# Patient Record
Sex: MALE | Race: WHITE | NOT HISPANIC OR LATINO | Employment: FULL TIME | URBAN - METROPOLITAN AREA
[De-identification: names, ages, dates, MRNs, and addresses within clinical notes are randomized per-mention and may not be internally consistent; named-entity substitution may affect disease eponyms.]

---

## 2017-03-31 ENCOUNTER — RADIANT APPOINTMENT (OUTPATIENT)
Dept: GENERAL RADIOLOGY | Facility: CLINIC | Age: 52
End: 2017-03-31
Attending: FAMILY MEDICINE
Payer: COMMERCIAL

## 2017-03-31 ENCOUNTER — OFFICE VISIT (OUTPATIENT)
Dept: FAMILY MEDICINE | Facility: CLINIC | Age: 52
End: 2017-03-31
Payer: COMMERCIAL

## 2017-03-31 VITALS
DIASTOLIC BLOOD PRESSURE: 84 MMHG | HEART RATE: 90 BPM | BODY MASS INDEX: 29.03 KG/M2 | HEIGHT: 73 IN | WEIGHT: 219 LBS | SYSTOLIC BLOOD PRESSURE: 126 MMHG

## 2017-03-31 DIAGNOSIS — M25.512 CHRONIC LEFT SHOULDER PAIN: Primary | ICD-10-CM

## 2017-03-31 DIAGNOSIS — G89.29 CHRONIC LEFT SHOULDER PAIN: Primary | ICD-10-CM

## 2017-03-31 DIAGNOSIS — Z12.11 SPECIAL SCREENING FOR MALIGNANT NEOPLASMS, COLON: ICD-10-CM

## 2017-03-31 DIAGNOSIS — M25.512 CHRONIC LEFT SHOULDER PAIN: ICD-10-CM

## 2017-03-31 DIAGNOSIS — G89.29 CHRONIC LEFT SHOULDER PAIN: ICD-10-CM

## 2017-03-31 PROCEDURE — 20610 DRAIN/INJ JOINT/BURSA W/O US: CPT | Mod: LT | Performed by: FAMILY MEDICINE

## 2017-03-31 PROCEDURE — 99213 OFFICE O/P EST LOW 20 MIN: CPT | Mod: 25 | Performed by: FAMILY MEDICINE

## 2017-03-31 PROCEDURE — 73030 X-RAY EXAM OF SHOULDER: CPT | Mod: LT

## 2017-03-31 RX ORDER — TRIAMCINOLONE ACETONIDE 40 MG/ML
40 INJECTION, SUSPENSION INTRA-ARTICULAR; INTRAMUSCULAR ONCE
Qty: 1 ML | Refills: 0 | COMMUNITY
Start: 2017-03-31 | End: 2023-06-12

## 2017-03-31 ASSESSMENT — ANXIETY QUESTIONNAIRES
IF YOU CHECKED OFF ANY PROBLEMS ON THIS QUESTIONNAIRE, HOW DIFFICULT HAVE THESE PROBLEMS MADE IT FOR YOU TO DO YOUR WORK, TAKE CARE OF THINGS AT HOME, OR GET ALONG WITH OTHER PEOPLE: VERY DIFFICULT
3. WORRYING TOO MUCH ABOUT DIFFERENT THINGS: MORE THAN HALF THE DAYS
5. BEING SO RESTLESS THAT IT IS HARD TO SIT STILL: SEVERAL DAYS
7. FEELING AFRAID AS IF SOMETHING AWFUL MIGHT HAPPEN: MORE THAN HALF THE DAYS
2. NOT BEING ABLE TO STOP OR CONTROL WORRYING: SEVERAL DAYS
1. FEELING NERVOUS, ANXIOUS, OR ON EDGE: SEVERAL DAYS
6. BECOMING EASILY ANNOYED OR IRRITABLE: NEARLY EVERY DAY
GAD7 TOTAL SCORE: 12

## 2017-03-31 ASSESSMENT — PATIENT HEALTH QUESTIONNAIRE - PHQ9: 5. POOR APPETITE OR OVEREATING: MORE THAN HALF THE DAYS

## 2017-03-31 NOTE — NURSING NOTE
"Initial /84  Pulse 90  Ht 6' 1\" (1.854 m)  Wt 219 lb (99.3 kg)  BMI 28.89 kg/m2 Estimated body mass index is 28.89 kg/(m^2) as calculated from the following:    Height as of this encounter: 6' 1\" (1.854 m).    Weight as of this encounter: 219 lb (99.3 kg). .    Tamra Crews    "

## 2017-03-31 NOTE — MR AVS SNAPSHOT
After Visit Summary   3/31/2017    Demetrius Burton    MRN: [id number]           Patient Information     Date Of Birth          1965        Visit Information        Provider Department      3/31/2017 1:20 PM Raj Almazan MD [facility]        Today's Diagnoses     Special screening for malignant neoplasms, colon    -  1    Chronic left shoulder pain          Care Instructions      Shoulder and Upper Back Stretch  To start, stand tall with your ears, shoulders, and hips in line. Your feet should be slightly apart, positioned just under your hips. Focus your eyes directly in front of you.  this position for a few seconds before starting your exercise. This helps increase your awareness of proper posture.  Reach overhead and slightly back with both arms. Keep your shoulders and neck aligned and your elbows behind your shoulders:    With your palms facing the ceiling, turn your fingers inward.    Take a deep breath. Breathe out, and lower your elbows toward your buttocks. Hold for 5 seconds, then return to starting position.    Repeat 3 times.         6804-1803 [employer]. [address] [place] [address]. All rights reserved. This information is not intended as a substitute for professional medical care. Always follow your healthcare professional's instructions.        Shoulder Girdle Stretch      To start, sit in a chair with your feet flat on the floor. Your weight should be slightly forward so that you re balanced evenly on your buttocks. Relax your shoulders and keep your head level. Using a chair with arms may help you keep your balance:    Place 1 hand on the outside elbow of the other arm.    Pull the arm across your body. Hold for 30 to 60 seconds. Repeat once.    Switch sides.  For your safety, check with your healthcare provider before starting an exercise program.     9659-3185 [employer]. [address] [place] [address],  [place] [address]. All rights reserved. This information is not intended as a substitute for professional medical care. Always follow your healthcare professional's instructions.        Shoulder Exercises      To start, sit in a chair with your feet flat on the floor. Your weight should be slightly forward so that you re balanced evenly on your buttocks. Relax your shoulders and keep your head level. Avoid arching your back or rounding your shoulders. Using a chair with arms may help you keep your balance.    Raise your arms, elbows bent, to shoulder height.    Slowly move your forearms together. Hold for 5 seconds.    Return to starting position. Repeat 5 times.    0395-1861 [employer]. [address] [place] [address]. All rights reserved. This information is not intended as a substitute for professional medical care. Always follow your healthcare professional's instructions.        Shoulder Squeeze Exercise      To start, sit in a chair with your feet flat on the floor. Shift your weight slightly forward to avoid rounding your back. Relax. Keep your ears, shoulders, and hips aligned:    Raise your arms to shoulder height, elbows bent and palms forward.    Move your arms back, squeezing your shoulder blades together.    Hold for 10 seconds. Return to starting position.     Repeat 5 times.   For your safety, check with your healthcare provider before starting an exercise program.     8430-9163 [employer]. [address] [place] [address]. All rights reserved. This information is not intended as a substitute for professional medical care. Always follow your healthcare professional's instructions.              Follow-ups after your visit        Future tests that were ordered for you today     Open Future Orders        Priority Expected Expires Ordered    Fecal colorectal cancer screen (FIT) Routine 4/21/2017 6/23/2017 3/31/2017            Who to contact     If you have  "questions or need follow up information about today's clinic visit or your schedule please contact [facility] directly at [phone].  Normal or non-critical lab and imaging results will be communicated to you by MyChart, letter or phone within 4 business days after the clinic has received the results. If you do not hear from us within 7 days, please contact the clinic through MyChart or phone. If you have a critical or abnormal lab result, we will notify you by phone as soon as possible.  Submit refill requests through [employer] or call your pharmacy and they will forward the refill request to us. Please allow 3 business days for your refill to be completed.          Additional Information About Your Visit        [email or web address] Information     [employer] gives you secure access to your electronic health record. If you see a primary care provider, you can also send messages to your care team and make appointments. If you have questions, please call your primary care clinic.  If you do not have a primary care provider, please call [phone] and they will assist you.        Care EveryWhere ID     This is your Care EveryWhere ID. This could be used by other organizations to access your [facility] medical records  [id number]        Your Vitals Were     Pulse Height BMI (Body Mass Index)             90 6' 1\" (1.854 m) 28.89 kg/m2          Blood Pressure from Last 3 Encounters:   03/31/17 126/84   11/04/16 129/85   02/09/16 136/87    Weight from Last 3 Encounters:   03/31/17 219 lb (99.3 kg)   11/04/16 215 lb (97.5 kg)   02/09/16 209 lb 9.6 oz (95.1 kg)               Primary Care Provider Office Phone # Fax #    Raj Almazan [phone]       [facility] [address] [facility] [address]        Thank you!     Thank you for choosing [facility]  for your care. Our goal is always to provide you with excellent care. Hearing back from our patients is one way we " can continue to improve our services. Please take a few minutes to complete the written survey that you may receive in the mail after your visit with us. Thank you!             Your Updated Medication List - Protect others around you: Learn how to safely use, store and throw away your medicines at www.disposemymeds.org.      Notice  As of 3/31/2017  2:15 PM    You have not been prescribed any medications.

## 2017-03-31 NOTE — PROGRESS NOTES
SUBJECTIVE:                                                    Demetrius Burton is a 52 year old male who presents to clinic today for the following health issues:      Joint Pain     Onset: months on the left, 2-3 mos for the left shoulder    Description:   Location: left shoulder and right shoulder  Character: Sharp and Gnawing, burning at night    Intensity: mild, moderate, severe    Progression of Symptoms: intermittent    Accompanying Signs & Symptoms:  Other symptoms: none   History:   Previous similar pain: YES      Precipitating factors:   Trauma or overuse: no     Alleviating factors:  Improved by: nothing       Therapies Tried and outcome: none      History as above. Patient is a 52 yr old male here for bilateral  shoulder pain L >R ongoing for about two months.He reports that the pain is a dull pain , it is more prominent at night and he has some reduced range of motion.He has not observed any swelling or wasting of muscles. No tingling or numbness in his fingers. He reports no injury or heavy lifting.He has not tired any medication for the shoulder pain.    Problem list and histories reviewed & adjusted, as indicated.  Additional history: as documented    Patient Active Problem List   Diagnosis     Other specified forms of hearing loss     HYPERLIPIDEMIA LDL GOAL <160     Prediabetes     No past surgical history on file.    Social History   Substance Use Topics     Smoking status: Never Smoker     Smokeless tobacco: Never Used     Alcohol use Yes      Comment: rare     Family History   Problem Relation Age of Onset     DIABETES Mother      HEART DISEASE Mother      stent placement.         Current Outpatient Prescriptions   Medication Sig Dispense Refill     triamcinolone acetonide (KENALOG-40) 40 MG/ML injection 1 mL (40 mg) by INTRA-ARTICULAR route once for 1 dose 1 mL 0     No Known Allergies  BP Readings from Last 3 Encounters:   03/31/17 126/84   11/04/16 129/85   02/09/16 136/87    Wt Readings  "from Last 3 Encounters:   03/31/17 219 lb (99.3 kg)   11/04/16 215 lb (97.5 kg)   02/09/16 209 lb 9.6 oz (95.1 kg)                  Labs reviewed in EPIC    Reviewed and updated as needed this visit by clinical staff       Reviewed and updated as needed this visit by Provider         ROS:  Constitutional, HEENT, cardiovascular, pulmonary, gi and gu systems are negative, except as otherwise noted.    OBJECTIVE:                                                    /84  Pulse 90  Ht 6' 1\" (1.854 m)  Wt 219 lb (99.3 kg)  BMI 28.89 kg/m2  Body mass index is 28.89 kg/(m^2).  GENERAL: healthy, alert and no distress  NECK: no adenopathy, no asymmetry, masses, or scars and thyroid normal to palpation  RESP: lungs clear to auscultation - no rales, rhonchi or wheezes  CV: regular rate and rhythm, normal S1 S2, no S3 or S4, no murmur, click or rub, no peripheral edema and peripheral pulses strong  ABDOMEN: soft, nontender, no hepatosplenomegaly, no masses and bowel sounds normal  MS: normal muscle tone, decreased range of motion in right shoulder and tenderness to palpation anterior shoulder    Diagnostic Test Results:  X-rays  IMPRESSION: Normal.     ASSESSMENT/PLAN:                                                    (M25.512,  G89.29) Chronic left shoulder pain  (primary encounter diagnosis)  Comment: Patient was given a cortisone injection.  Plan: TRIAMCINOLONE ACET INJ 10 MG, INJECTION         INTRAMUSCULAR OR SUB-Q, CANCELED: XR Shoulder         Left 2 Views           (Z12.11) Special screening for malignant neoplasms, colon  Comment: .Patient will be notified of results  Plan: Fecal colorectal cancer screen (FIT)          FUTURE APPOINTMENTS:       - Follow-up visit as needed.    Raj Almazan MD  [facility]  "

## 2017-03-31 NOTE — NURSING NOTE
"Initial BP (!) 141/107 (BP Location: Right arm, Patient Position: Chair, Cuff Size: Adult Large)  Pulse 90  Ht 6' 1\" (1.854 m)  Wt 219 lb (99.3 kg)  BMI 28.89 kg/m2 Estimated body mass index is 28.89 kg/(m^2) as calculated from the following:    Height as of this encounter: 6' 1\" (1.854 m).    Weight as of this encounter: 219 lb (99.3 kg). .    Tamra Crews    "

## 2017-03-31 NOTE — PATIENT INSTRUCTIONS
Shoulder and Upper Back Stretch  To start, stand tall with your ears, shoulders, and hips in line. Your feet should be slightly apart, positioned just under your hips. Focus your eyes directly in front of you.  this position for a few seconds before starting your exercise. This helps increase your awareness of proper posture.  Reach overhead and slightly back with both arms. Keep your shoulders and neck aligned and your elbows behind your shoulders:    With your palms facing the ceiling, turn your fingers inward.    Take a deep breath. Breathe out, and lower your elbows toward your buttocks. Hold for 5 seconds, then return to starting position.    Repeat 3 times.         1587-4168 [employer]. [address] [place] [address]. All rights reserved. This information is not intended as a substitute for professional medical care. Always follow your healthcare professional's instructions.        Shoulder Girdle Stretch      To start, sit in a chair with your feet flat on the floor. Your weight should be slightly forward so that you re balanced evenly on your buttocks. Relax your shoulders and keep your head level. Using a chair with arms may help you keep your balance:    Place 1 hand on the outside elbow of the other arm.    Pull the arm across your body. Hold for 30 to 60 seconds. Repeat once.    Switch sides.  For your safety, check with your healthcare provider before starting an exercise program.     9698-9601 [employer]. [address] [place] [address]. All rights reserved. This information is not intended as a substitute for professional medical care. Always follow your healthcare professional's instructions.        Shoulder Exercises      To start, sit in a chair with your feet flat on the floor. Your weight should be slightly forward so that you re balanced evenly on your buttocks. Relax your shoulders and keep your head level. Avoid arching your back or  rounding your shoulders. Using a chair with arms may help you keep your balance.    Raise your arms, elbows bent, to shoulder height.    Slowly move your forearms together. Hold for 5 seconds.    Return to starting position. Repeat 5 times.    5635-6945 [employer]. [address] [place] [address]. All rights reserved. This information is not intended as a substitute for professional medical care. Always follow your healthcare professional's instructions.        Shoulder Squeeze Exercise      To start, sit in a chair with your feet flat on the floor. Shift your weight slightly forward to avoid rounding your back. Relax. Keep your ears, shoulders, and hips aligned:    Raise your arms to shoulder height, elbows bent and palms forward.    Move your arms back, squeezing your shoulder blades together.    Hold for 10 seconds. Return to starting position.     Repeat 5 times.   For your safety, check with your healthcare provider before starting an exercise program.     2163-6792 [employer]. [address] [place] [address]. All rights reserved. This information is not intended as a substitute for professional medical care. Always follow your healthcare professional's instructions.

## 2017-04-01 ASSESSMENT — ANXIETY QUESTIONNAIRES: GAD7 TOTAL SCORE: 12

## 2017-04-01 ASSESSMENT — PATIENT HEALTH QUESTIONNAIRE - PHQ9: SUM OF ALL RESPONSES TO PHQ QUESTIONS 1-9: 16

## 2017-04-05 PROCEDURE — 82274 ASSAY TEST FOR BLOOD FECAL: CPT | Performed by: FAMILY MEDICINE

## 2017-04-06 DIAGNOSIS — Z12.11 SPECIAL SCREENING FOR MALIGNANT NEOPLASMS, COLON: ICD-10-CM

## 2017-04-06 LAB — HEMOCCULT STL QL IA: NEGATIVE

## 2017-04-06 NOTE — PROGRESS NOTES
Please inform patient that test result was within normal parameters.   Thank you.     Raj Almazan M.D.

## 2017-08-25 NOTE — PROCEDURES
Injection Procedure note  After obtaining consent from the patient the area of the left shoulder joint was prepped in the usual fashion. Lidocaine 1 % with Kenalog (40 Mg ) was injected into the joint space .Area was cleansed .Band aid applied.Patient tolerated the procedure well.   negative...

## 2017-10-27 ENCOUNTER — OFFICE VISIT (OUTPATIENT)
Dept: FAMILY MEDICINE | Facility: CLINIC | Age: 52
End: 2017-10-27
Payer: COMMERCIAL

## 2017-10-27 VITALS
OXYGEN SATURATION: 98 % | HEART RATE: 69 BPM | HEIGHT: 73 IN | BODY MASS INDEX: 27.3 KG/M2 | DIASTOLIC BLOOD PRESSURE: 84 MMHG | SYSTOLIC BLOOD PRESSURE: 130 MMHG | WEIGHT: 206 LBS

## 2017-10-27 DIAGNOSIS — Z12.11 SPECIAL SCREENING FOR MALIGNANT NEOPLASMS, COLON: ICD-10-CM

## 2017-10-27 DIAGNOSIS — Z23 NEED FOR PROPHYLACTIC VACCINATION AND INOCULATION AGAINST INFLUENZA: ICD-10-CM

## 2017-10-27 DIAGNOSIS — Z00.00 ENCOUNTER FOR ROUTINE ADULT HEALTH EXAMINATION WITHOUT ABNORMAL FINDINGS: Primary | ICD-10-CM

## 2017-10-27 LAB
CHOLEST SERPL-MCNC: 142 MG/DL
GLUCOSE SERPL-MCNC: 97 MG/DL (ref 70–99)
HDLC SERPL-MCNC: 29 MG/DL
LDLC SERPL CALC-MCNC: 78 MG/DL
NONHDLC SERPL-MCNC: 113 MG/DL
TRIGL SERPL-MCNC: 174 MG/DL

## 2017-10-27 PROCEDURE — 36415 COLL VENOUS BLD VENIPUNCTURE: CPT | Performed by: FAMILY MEDICINE

## 2017-10-27 PROCEDURE — 90471 IMMUNIZATION ADMIN: CPT | Performed by: FAMILY MEDICINE

## 2017-10-27 PROCEDURE — 82947 ASSAY GLUCOSE BLOOD QUANT: CPT | Performed by: FAMILY MEDICINE

## 2017-10-27 PROCEDURE — 80061 LIPID PANEL: CPT | Performed by: FAMILY MEDICINE

## 2017-10-27 PROCEDURE — 99396 PREV VISIT EST AGE 40-64: CPT | Mod: 25 | Performed by: FAMILY MEDICINE

## 2017-10-27 PROCEDURE — 90686 IIV4 VACC NO PRSV 0.5 ML IM: CPT | Performed by: FAMILY MEDICINE

## 2017-10-27 ASSESSMENT — ANXIETY QUESTIONNAIRES
GAD7 TOTAL SCORE: 0
5. BEING SO RESTLESS THAT IT IS HARD TO SIT STILL: NOT AT ALL
6. BECOMING EASILY ANNOYED OR IRRITABLE: NOT AT ALL
7. FEELING AFRAID AS IF SOMETHING AWFUL MIGHT HAPPEN: NOT AT ALL
1. FEELING NERVOUS, ANXIOUS, OR ON EDGE: NOT AT ALL
3. WORRYING TOO MUCH ABOUT DIFFERENT THINGS: NOT AT ALL
2. NOT BEING ABLE TO STOP OR CONTROL WORRYING: NOT AT ALL

## 2017-10-27 ASSESSMENT — PATIENT HEALTH QUESTIONNAIRE - PHQ9
SUM OF ALL RESPONSES TO PHQ QUESTIONS 1-9: 0
5. POOR APPETITE OR OVEREATING: NOT AT ALL

## 2017-10-27 NOTE — PROGRESS NOTES
SUBJECTIVE:   CC: Demetrius Burton is an 52 year old male who presents for preventative health visit.     Healthy Habits:    Do you get at least three servings of calcium containing foods daily (dairy, green leafy vegetables, etc.)? yes    Amount of exercise or daily activities, outside of work: occasional    Problems taking medications regularly No    Medication side effects: No    Have you had an eye exam in the past two years? yes    Do you see a dentist twice per year? yes    Do you have sleep apnea, excessive snoring or daytime drowsiness?no          Patient is here for his annual physical. He has no acute symptoms today. He is not on any routine medication . He will have some routine labs done today and his FIT screen.     Today's PHQ-2 Score:   PHQ-2 ( 1999 [employer]) 10/27/2017 3/31/2017   Q1: Little interest or pleasure in doing things 0 3   Q2: Feeling down, depressed or hopeless 0 2   PHQ-2 Score 0 5   Q1: Little interest or pleasure in doing things Not at all -   Q2: Feeling down, depressed or hopeless Not at all -   PHQ-2 Score 0 -         Abuse: Current or Past(Physical, Sexual or Emotional)- No  Do you feel safe in your environment - Yes  Social History   Substance Use Topics     Smoking status: Never Smoker     Smokeless tobacco: Never Used     Alcohol use Yes      Comment: rare     The patient does not drink >3 drinks per day nor >7 drinks per week.    Last PSA: No results found for: PSA    Reviewed orders with patient. Reviewed health maintenance and updated orders accordingly - Yes  Labs reviewed in EPIC  BP Readings from Last 3 Encounters:   10/27/17 130/84   03/31/17 126/84   11/04/16 129/85    Wt Readings from Last 3 Encounters:   10/27/17 206 lb (93.4 kg)   03/31/17 219 lb (99.3 kg)   11/04/16 215 lb (97.5 kg)                  Patient Active Problem List   Diagnosis     Other specified forms of hearing loss     HYPERLIPIDEMIA LDL GOAL <160     Prediabetes     No past surgical history on file.   "  Social History   Substance Use Topics     Smoking status: Never Smoker     Smokeless tobacco: Never Used     Alcohol use Yes      Comment: rare     Family History   Problem Relation Age of Onset     DIABETES Mother      HEART DISEASE Mother      stent placement.         Current Outpatient Prescriptions   Medication Sig Dispense Refill     triamcinolone acetonide (KENALOG-40) 40 MG/ML injection 1 mL (40 mg) by INTRA-ARTICULAR route once for 1 dose 1 mL 0     No Known Allergies        Reviewed and updated as needed this visit by clinical staffMeds         Reviewed and updated as needed this visit by Provider        No past medical history on file.   No past surgical history on file.    ROS:  C: NEGATIVE for fever, chills, change in weight  I: NEGATIVE for worrisome rashes, moles or lesions  E: NEGATIVE for vision changes or irritation  ENT: NEGATIVE for ear, mouth and throat problems  R: NEGATIVE for significant cough or SOB  CV: NEGATIVE for chest pain, palpitations or peripheral edema  GI: NEGATIVE for nausea, abdominal pain, heartburn, or change in bowel habits   male: negative for dysuria, hematuria, decreased urinary stream, erectile dysfunction, urethral discharge  M: NEGATIVE for significant arthralgias or myalgia  N: NEGATIVE for weakness, dizziness or paresthesias  P: NEGATIVE for changes in mood or affect    OBJECTIVE:   /84  Pulse 69  Ht 6' 1\" (1.854 m)  Wt 206 lb (93.4 kg)  SpO2 98%  BMI 27.18 kg/m2  EXAM:  GENERAL: healthy, alert and no distress  EYES: Eyes grossly normal to inspection, PERRL and conjunctivae and sclerae normal  HENT: ear canals and TM's normal, nose and mouth without ulcers or lesions  NECK: no adenopathy, no asymmetry, masses, or scars and thyroid normal to palpation  RESP: lungs clear to auscultation - no rales, rhonchi or wheezes  CV: regular rate and rhythm, normal S1 S2, no S3 or S4, no murmur, click or rub, no peripheral edema and peripheral pulses strong  ABDOMEN: " "soft, nontender, no hepatosplenomegaly, no masses and bowel sounds normal  MS: no gross musculoskeletal defects noted, no edema  SKIN: no suspicious lesions or rashes  PSYCH: mentation appears normal, affect normal/bright    ASSESSMENT/PLAN:   (Z00.00) Encounter for routine adult health examination without abnormal findings  (primary encounter diagnosis)  Comment: Patient will be notified of results  Plan: Lipid panel reflex to direct LDL Fasting,         Glucose      (Z12.11) Special screening for malignant neoplasms, colon  Comment: Patient will be notified of results  Plan: Fecal colorectal cancer screen (FIT)            (Z23) Need for prophylactic vaccination and inoculation against influenza  Comment: Immunization updated  Plan: FLU VAC, SPLIT VIRUS IM > 3 YO (QUADRIVALENT)         [95888], Vaccine Administration, Initial         [20477]              COUNSELING:  Reviewed preventive health counseling, as reflected in patient instructions       Regular exercise       Healthy diet/nutrition       Vision screening    BP Screening:   Last 3 BP Readings:    BP Readings from Last 3 Encounters:   10/27/17 130/84   03/31/17 126/84   11/04/16 129/85       The following was recommended to the patient:  Re-screen BP within a year and recommended lifestyle modifications       reports that he has never smoked. He has never used smokeless tobacco.      Estimated body mass index is 27.18 kg/(m^2) as calculated from the following:    Height as of this encounter: 6' 1\" (1.854 m).    Weight as of this encounter: 206 lb (93.4 kg).   Weight management plan: Discussed healthy diet and exercise guidelines and patient will follow up in 12 months in clinic to re-evaluate.    Counseling Resources:  ATP IV Guidelines  Pooled Cohorts Equation Calculator  FRAX Risk Assessment  ICSI Preventive Guidelines  Dietary Guidelines for Americans, 2010  USDA's MyPlate  ASA Prophylaxis  Lung CA Screening    Raj Almazan MD  [facility]  Injectable Influenza Immunization Documentation    1.  Is the person to be vaccinated sick today?   No    2. Does the person to be vaccinated have an allergy to a component   of the vaccine?   No  Egg Allergy Algorithm Link    3. Has the person to be vaccinated ever had a serious reaction   to influenza vaccine in the past?   No    4. Has the person to be vaccinated ever had Guillain-Barré syndrome?   No    Form completed by Tamra Crews           Answers for HPI/ROS submitted by the patient on 10/27/2017   Annual Exam:  Getting at least 3 servings of Calcium per day:: Yes  Bi-annual eye exam:: NO  Dental care twice a year:: Yes  Sleep apnea or symptoms of sleep apnea:: None  Frequency of exercise:: None  Diet:: Vegetarian/vegan  Taking medications regularly:: Yes  Medication side effects:: None  Additional concerns today:: No  PHQ-2 Score: 0

## 2017-10-27 NOTE — NURSING NOTE
"Initial BP (!) 137/93 (BP Location: Right arm, Patient Position: Chair, Cuff Size: Adult Large)  Pulse 69  Ht 6' 1\" (1.854 m)  Wt 206 lb (93.4 kg)  SpO2 98%  BMI 27.18 kg/m2 Estimated body mass index is 27.18 kg/(m^2) as calculated from the following:    Height as of this encounter: 6' 1\" (1.854 m).    Weight as of this encounter: 206 lb (93.4 kg). .  Tamra Crews    "

## 2017-10-27 NOTE — PROGRESS NOTES
Please inform patient that test result showed that his triglycerides were high but it is much better than the last check. His HDL was very low. Needs to work on diet and exercise.   Thank you.     Raj Almazan M.D.

## 2017-10-27 NOTE — MR AVS SNAPSHOT
After Visit Summary   10/27/2017    Demetrius Burton    MRN: [id number]           Patient Information     Date Of Birth          1965        Visit Information        Provider Department      10/27/2017 7:40 AM Raj Almazan MD [facility]        Today's Diagnoses     Special screening for malignant neoplasms, colon    -  1    Encounter for routine adult health examination without abnormal findings          Care Instructions      Preventive Health Recommendations  Male Ages 50 - 64    Yearly exam:             See your health care provider every year in order to  o   Review health changes.   o   Discuss preventive care.    o   Review your medicines if your doctor has prescribed any.     Have a cholesterol test every 5 years, or more frequently if you are at risk for high cholesterol/heart disease.     Have a diabetes test (fasting glucose) every three years. If you are at risk for diabetes, you should have this test more often.     Have a colonoscopy at age 50, or have a yearly FIT test (stool test). These exams will check for colon cancer.      Talk with your health care provider about whether or not a prostate cancer screening test (PSA) is right for you.    You should be tested each year for STDs (sexually transmitted diseases), if you re at risk.     Shots: Get a flu shot each year. Get a tetanus shot every 10 years.     Nutrition:    Eat at least 5 servings of fruits and vegetables daily.     Eat whole-grain bread, whole-wheat pasta and brown rice instead of white grains and rice.     Talk to your provider about Calcium and Vitamin D.     Lifestyle    Exercise for at least 150 minutes a week (30 minutes a day, 5 days a week). This will help you control your weight and prevent disease.     Limit alcohol to one drink per day.     No smoking.     Wear sunscreen to prevent skin cancer.     See your dentist every six months for an exam and cleaning.     See your eye doctor  "every 1 to 2 years.            Follow-ups after your visit        Future tests that were ordered for you today     Open Future Orders        Priority Expected Expires Ordered    Fecal colorectal cancer screen (FIT) Routine 11/17/2017 1/19/2018 10/27/2017            Who to contact     If you have questions or need follow up information about today's clinic visit or your schedule please contact [facility] directly at [phone].  Normal or non-critical lab and imaging results will be communicated to you by [employer], letter or phone within 4 business days after the clinic has received the results. If you do not hear from us within 7 days, please contact the clinic through [employer] or phone. If you have a critical or abnormal lab result, we will notify you by phone as soon as possible.  Submit refill requests through [employer] or call your pharmacy and they will forward the refill request to us. Please allow 3 business days for your refill to be completed.          Additional Information About Your Visit        [employer] Information     [employer] gives you secure access to your electronic health record. If you see a primary care provider, you can also send messages to your care team and make appointments. If you have questions, please call your primary care clinic.  If you do not have a primary care provider, please call [phone] and they will assist you.        Care EveryWhere ID     This is your Care EveryWhere ID. This could be used by other organizations to access your [facility] medical records  [id number]        Your Vitals Were     Pulse Height Pulse Oximetry BMI (Body Mass Index)          69 6' 1\" (1.854 m) 98% 27.18 kg/m2         Blood Pressure from Last 3 Encounters:   10/27/17 (!) 137/93   03/31/17 126/84   11/04/16 129/85    Weight from Last 3 Encounters:   10/27/17 206 lb (93.4 kg)   03/31/17 219 lb (99.3 kg)   11/04/16 215 lb (97.5 kg)              We Performed the Following     Glucose     " Lipid panel reflex to direct LDL Fasting        Primary Care Provider Office Phone # Fax #    Raj Almazan [phone] [address] [facility] [address]        Equal Access to Services     RADHA STEWART : Hadii aad ku hadbrandono Soomaali, waaxda luqadaha, qaybta kaalmada adeegyada, adama amador haylali nelsonanyilindsey hodges. So [facility] [phone].    ATENCIÓN: Si habla español, tiene a castellanos disposición servicios gratuitos de asistencia lingüística. Llame al [phone].    We comply with applicable federal civil rights laws and [place] laws. We do not discriminate on the basis of race, color, national origin, age, disability, sex, sexual orientation, or gender identity.            Thank you!     Thank you for choosing [facility]  for your care. Our goal is always to provide you with excellent care. Hearing back from our patients is one way we can continue to improve our services. Please take a few minutes to complete the written survey that you may receive in the mail after your visit with us. Thank you!             Your Updated Medication List - Protect others around you: Learn how to safely use, store and throw away your medicines at www.disposemymeds.org.          This list is accurate as of: 10/27/17  7:53 AM.  Always use your most recent med list.                   Brand Name Dispense Instructions for use Diagnosis    triamcinolone acetonide 40 MG/ML injection    KENALOG-40    1 mL    1 mL (40 mg) by INTRA-ARTICULAR route once for 1 dose

## 2017-10-27 NOTE — NURSING NOTE
"Initial /84  Pulse 69  Ht 6' 1\" (1.854 m)  Wt 206 lb (93.4 kg)  SpO2 98%  BMI 27.18 kg/m2 Estimated body mass index is 27.18 kg/(m^2) as calculated from the following:    Height as of this encounter: 6' 1\" (1.854 m).    Weight as of this encounter: 206 lb (93.4 kg). .    Tamra Crews    "

## 2017-10-28 ASSESSMENT — ANXIETY QUESTIONNAIRES: GAD7 TOTAL SCORE: 0

## 2017-10-29 ENCOUNTER — TELEPHONE (OUTPATIENT)
Dept: FAMILY MEDICINE | Facility: CLINIC | Age: 52
End: 2017-10-29

## 2017-10-29 NOTE — TELEPHONE ENCOUNTER
[employer] - health screening form completed and signed at appointment and faxed to [phone] and original mailed to patient.

## 2018-10-11 ENCOUNTER — TELEPHONE (OUTPATIENT)
Dept: FAMILY MEDICINE | Facility: CLINIC | Age: 53
End: 2018-10-11

## 2018-10-11 NOTE — TELEPHONE ENCOUNTER
Panel Management Review      Patient has the following on his problem list: None      Composite cancer screening  Chart review shows that this patient is due/due soon for the following Colonoscopy  Summary:    Patient is due/failing the following:   COLONOSCOPY    Action needed:   Patient needs referral/order: colonoscopy or fit test     Type of outreach:    Phone, spoke to patient.  Patient will be coming in for pe will take at that time about fit test     Questions for provider review:    None                                                                                                                                    Jany Petersen CMA     Chart routed to  .

## 2019-11-03 ENCOUNTER — HEALTH MAINTENANCE LETTER (OUTPATIENT)
Age: 54
End: 2019-11-03

## 2020-11-16 ENCOUNTER — HEALTH MAINTENANCE LETTER (OUTPATIENT)
Age: 55
End: 2020-11-16

## 2021-02-22 ENCOUNTER — MYC MEDICAL ADVICE (OUTPATIENT)
Dept: FAMILY MEDICINE | Facility: CLINIC | Age: 56
End: 2021-02-22

## 2021-02-25 ENCOUNTER — VIRTUAL VISIT (OUTPATIENT)
Dept: FAMILY MEDICINE | Facility: CLINIC | Age: 56
End: 2021-02-25
Payer: COMMERCIAL

## 2021-02-25 DIAGNOSIS — J12.82 PNEUMONIA DUE TO 2019 NOVEL CORONAVIRUS: Primary | ICD-10-CM

## 2021-02-25 DIAGNOSIS — U07.1 PNEUMONIA DUE TO 2019 NOVEL CORONAVIRUS: Primary | ICD-10-CM

## 2021-02-25 PROCEDURE — 99204 OFFICE O/P NEW MOD 45 MIN: CPT | Mod: 95 | Performed by: FAMILY MEDICINE

## 2021-02-25 RX ORDER — AZITHROMYCIN 250 MG/1
TABLET, FILM COATED ORAL
Qty: 6 TABLET | Refills: 0 | Status: SHIPPED | OUTPATIENT
Start: 2021-02-25 | End: 2023-06-12

## 2021-02-25 RX ORDER — PREDNISONE 20 MG/1
40 TABLET ORAL DAILY
Qty: 10 TABLET | Refills: 0 | Status: SHIPPED | OUTPATIENT
Start: 2021-02-25 | End: 2021-03-02

## 2021-02-25 NOTE — PROGRESS NOTES
Demetrius is a 55 year old who is being evaluated via a billable video visit.      How would you like to obtain your AVS? MyChart  If the video visit is dropped, the invitation should be resent by: Send to e-mail at: [email or web address]  Will anyone else be joining your video visit? No      Video Start Time: 1:03 PM    Assessment & Plan     Pneumonia due to 2019 novel coronavirus  - azithromycin (ZITHROMAX) 250 MG tablet; Take 2 tabs on day 1 and one tab on day 2,3,4,5  - predniSONE (DELTASONE) 20 MG tablet; Take 2 tablets (40 mg) by mouth daily for 5 days  - antibiotics faxed , recommend increase in fluids.         FUTURE APPOINTMENTS:       - Follow-up visit in one week .    Return in about 1 week (around 3/4/2021), or if symptoms worsen or fail to improve, for Follow up.    Raj Almazan MD  [facility]    Yuval Romo is a 55 year old who presents for the following health issues  accompanied by his spouse:    HPI   55 yr old male with no significant past medical history diagnosed with COVID 19 a couple of weeks ago but still coughing and short of breath . He is still quite fatigued.     He says the cough is mostly dry but he feels he could he will be able to expectorate some phlegm, he feels the phlegm seems stuck in his chest. Patient reports no fevers, no chills no other systemic symptoms.        Concern for COVID-19  About how many days ago did these symptoms start? 19 days tested positive on 2/10/21  Is this your first visit for this illness? No   How would you describe your symptoms since your last visit? My symptoms have improved, except for a cough  In the 14 days before your symptoms started, have you had close contact with someone with COVID-19 (Coronavirus)? He tested positice  Do you have a fever or chills? No  Are you having new or worsening difficulty breathing? Yes   Please describe what kind of difficulty you are having breathing:No dyspnea, or dyspnea at  patients normal baseline, only shortness of breath when breathing too deep  Do you have new or worsening cough? Yes, it's a dry cough.   Have you had any new or unexplained body aches? No    Have you experienced any of the following NEW symptoms?    Headache: YES    Sore throat: No    Loss of taste or smell: No    Chest pain: No    Diarrhea: No    Rash: No  What treatments have you tried? OTC  Who do you live with? Wife and a adult son  Are you, or a household member, a healthcare worker or a ? No  Do you live in a nursing home, group home, or shelter? No  Do you have a way to get food/medications if quarantined? Yes, I have a friend or family member who can help me.        Review of Systems   Constitutional, HEENT, cardiovascular, pulmonary, gi and gu systems are negative, except as otherwise noted.      Objective           Vitals:  No vitals were obtained today due to virtual visit.    Physical Exam   GENERAL: alert and mild distress  EYES: Eyes grossly normal to inspection.  No discharge or erythema, or obvious scleral/conjunctival abnormalities.  RESP: No audible wheeze, patient was coughing during encounter, no visible cyanosis.  No visible retractions or increased work of breathing.    SKIN: Visible skin clear. No significant rash, abnormal pigmentation or lesions.   Mentation and speech appropriate for age.  PSYCH: Mentation appears normal, affect normal/bright, judgement and insight intact, normal speech and appearance well-groomed.      Video-Visit Details    Type of service:  Video Visit    Video End Time:1:10 PM    Originating Location (pt. Location): Home    Distant Location (provider location):  [facility]     Platform used for Video Visit: Ensequence

## 2021-04-18 ENCOUNTER — MYC MEDICAL ADVICE (OUTPATIENT)
Dept: FAMILY MEDICINE | Facility: CLINIC | Age: 56
End: 2021-04-18

## 2021-04-23 ENCOUNTER — OFFICE VISIT (OUTPATIENT)
Dept: FAMILY MEDICINE | Facility: CLINIC | Age: 56
End: 2021-04-23
Payer: COMMERCIAL

## 2021-04-23 VITALS
RESPIRATION RATE: 14 BRPM | WEIGHT: 190.8 LBS | BODY MASS INDEX: 25.17 KG/M2 | HEART RATE: 85 BPM | OXYGEN SATURATION: 96 % | SYSTOLIC BLOOD PRESSURE: 138 MMHG | TEMPERATURE: 97.4 F | DIASTOLIC BLOOD PRESSURE: 86 MMHG

## 2021-04-23 DIAGNOSIS — K64.4 EXTERNAL HEMORRHOIDS: ICD-10-CM

## 2021-04-23 DIAGNOSIS — K92.1 BLOOD IN STOOL: Primary | ICD-10-CM

## 2021-04-23 DIAGNOSIS — Z12.11 COLON CANCER SCREENING: ICD-10-CM

## 2021-04-23 PROCEDURE — 99214 OFFICE O/P EST MOD 30 MIN: CPT | Performed by: FAMILY MEDICINE

## 2021-04-23 ASSESSMENT — ENCOUNTER SYMPTOMS
DIARRHEA: 0
NAUSEA: 0
ENDOCRINE NEGATIVE: 1
CONSTITUTIONAL NEGATIVE: 1
CONSTIPATION: 0
ALLERGIC/IMMUNOLOGIC NEGATIVE: 1
RESPIRATORY NEGATIVE: 1
HEMATOCHEZIA: 1
PSYCHIATRIC NEGATIVE: 1
MUSCULOSKELETAL NEGATIVE: 1
RECTAL PAIN: 1
ABDOMINAL PAIN: 0
HEMATOLOGIC/LYMPHATIC NEGATIVE: 1
VOMITING: 0
CARDIOVASCULAR NEGATIVE: 1
NEUROLOGICAL NEGATIVE: 1
HEARTBURN: 0
EYES NEGATIVE: 1

## 2021-04-23 NOTE — PATIENT INSTRUCTIONS
Patient Education   Colonoscopy  What You Should Know  Please arrive with an adult who can drive you home after the exam. This adult should and stay with you for the next 24 hours unless your provider says otherwise. The medicines used in the exam will make you sleepy. You will not be able to drive. You cannot take a bus or taxi by yourself.  What is a colonoscopy?  A colonoscopy lets the doctor look inside your large intestine (colon). The doctor guides a scope, or small camera, through the entire colon. The scope is attached to a long, flexible tube. The image from the scope appears on a large TV screen.   The scope will send air into your colon. This opens the colon so the doctor can see it better on the screen.  The exam looks for defects such as inflamed tissue, polyps, ulcers (sores), diverticula (pouches in the wall of the colon) and signs of cancer.  How do I prepare for the exam?  Read your guidelines for cleansing the colon. It is highly important that you follow the directions closely. If your colon is empty and clean, your exam will be more thorough and take less time.   Be sure to tell your doctor or nurse if you have ever had eye, lung or heart disease (including endocarditis or an artificial valve); diabetes; hepatitis; or any allergies to medicines.  Plan to arrive on time for your exam.    Dress in comfortable, loose clothing.    Bring your insurance card. Leave your purse, billfold, credit cards and other valuables at home.    Bring a list of your medicines and known allergies. If you have a pacemaker or ICD, please bring your information card.    We do our best to stay on time, but there may be a delay. Please bring something to pass the time, such as a newspaper or book.  What happens when I arrive?    You will fill out some paperwork, then we will take you to a private area. A nurse will check your records and ask you questions.    You will change into a hospital gown. We may ask you to remove  any jewelry.    We will review the risks and benefits of the exam. You will need to sign a consent form.    We will insert an IV (thin tube) into a vein in your hand or arm. We will use this to give you medicine.  What happens during the exam?  The exam takes from 15 minutes to one hour. You may ask questions of the doctor.    You will lie on your left side on a table.    You will receive medicines to relieve pain and help you relax.    The doctor will insert the scope into your rectum (bottom). The scope is on a long, thin tube. The doctor will slowly guide the scope into your colon. The tube bends, so it can move through the curves of your colon.    We may ask you to change positions to help the doctor move the scope.  If we see any polyps (growths), we will remove them. We do this because polyps can cause bleeding or turn into cancer. For some polyps, we will take tissue (a biopsy) to test in the lab. Most polyps turn out to be benign (not cancer).   Will it hurt?  You should feel little or no discomfort. The air will make you will feel full, and you will notice some pressure as the tube passes through your colon. Tell your doctor or nurse if you feel any pain. If you have cramps, breathe slowly to help your body relax.   What happens after the exam?    We will take you to the recovery area. We will watch you for up to one hour or until most of the medicine has worn off.    We will remove the IV. You will receive a report about your exam.    You may feel bloated or crampy for a short time because of the air that was injected. You will need to be passing air before you go home.    Your first meal should be light. Slowly return to normal meals, unless your doctor gives you other orders. Take it easy the rest of the day.    If you had a polyp removed:  ? Avoid heavy exercise for one week (no heavy lifting, sports or other activity).  ? You may have bleeding for several days after your exam. Call your doctor if  bleeding is heavy or you have black or bloody stools (bowel movements).  ? If you normally take aspirin or blood thinners, ask the prescribing doctor when you can start taking them again.    You may receive more than one bill for your exam. (Separate charges may come from the clinic, doctor, lab, etc.).  What if I need to change my appointment?  If you cannot keep your appointment, please call us as soon as you can. Our center is very busy, and we will need to contact the patient who comes after you.  For informational purposes only. Not to replace the advice of your health care provider. Copyright   2007 [employer]. All rights reserved. Clinically reviewed by Gastroenterology Steering Committee. [employer] 989893 - REV 12/20.       Patient Education     Treating Hemorrhoids: Self-Care  Follow your healthcare provider s advice about caring for your hemorrhoids at home. Some treatments help relieve symptoms right away. Others involve making changes in your diet and exercise habits. These can help ease constipation and prevent hemorrhoids from coming back.   Relieving symptoms  Your healthcare provider may prescribe anti-inflammatory medicine to help ease your symptoms. These tips will also help relieve pain and swelling.     Take sitz baths. Taking a sitz bath means sitting in a few inches of warm bath water. Soaking for 10 minutes twice a day can provide relief from painful hemorrhoids. It can also help the area stay clean.    Develop good bowel habits. Use the bathroom when you need to. Don t ignore the urge to move your bowels. This can lead to constipation, hard stools, and straining. Also, don t read while on the toilet. Sit only as long as needed. Wipe gently with soft, unscented toilet tissue or baby wipes.    Use ice packs. Placing an ice pack on an external hemorrhoid can help relieve pain right away. It will also help reduce the blood clot. Use the ice for 15 to 20 minutes at a time. Keep a  cloth between the ice and your skin to prevent skin damage.    Use other measures. Laxatives and enemas can help ease constipation. But use them only on your healthcare provider s advice. For symptom relief, try using cotton pads soaked in witch hazel. These are available at most drugstores. Over-the-counter hemorrhoid ointments and petroleum jelly can also provide relief.  Add fiber to your diet    Adding fiber to your diet can help relieve constipation by making stools softer and easier to pass. To increase your fiber intake, your healthcare provider may recommend a bulking agent, such as psyllium. This is a high-fiber supplement available at most grocery stores and drugstores. Eating more fiber-rich foods will also help. There are two types of fiber:     Insoluble fiber is the main ingredient in bulking agents. It s also found in foods such as wheat bran, whole-grain breads, fresh fruits, and vegetables.    Soluble fiber is found in foods such as oat bran. Although soluble fiber is good for you, it may not ease constipation as much as foods high in insoluble fiber.  Drink more water  Along with a high-fiber diet, drinking more water can help ease constipation. This is because insoluble fiber absorbs water, making stools soft and bulky. Be sure to drink plenty of water throughout the day. Drinking fruit juices, such as prune juice or apple juice, can also help prevent constipation.   Get more exercise  Regular exercise aids digestion and helps prevent constipation. It s also great for your health. So talk with your healthcare provider about starting an exercise program. Low-impact activities, such as swimming or walking, are good places to start. Take it easy at first. And remember to drink plenty of water when you exercise.   High-fiber foods Easy ways to add fiber  High-fiber foods offer many benefits. By making your stools softer, they help heal and prevent swollen hemorrhoids. They may also help reduce the  risk of colon and rectal cancer. Best of all, they re usually low in calories and taste great. Here are some examples of fiber-rich foods.     Whole grains, such as wheat bran, corn bran, and brown rice    Vegetables, especially carrots, broccoli, cabbage, and peas    Fruits, such as apples, bananas, raisins, peaches, prunes, and pears    Nuts and legumes, especially peanuts, lentils, and kidney beans  Easy ways to add fiber  The tips below offer some simple ways to add more high-fiber foods to your meals.     Start your day with a high-fiber breakfast. Eat a wheat bran cereal along with a sliced banana. Or, try peanut butter on whole-wheat toast.    Eat carrot sticks for snacks. They re easy to prepare, taste great, and are low in calories.    Use whole-grain breads instead of white bread for sandwiches.    Eat fruits for treats. Try an apple and some raisins instead of a candy bar.  [employer] last reviewed this educational content on 6/1/2019 2000-2021 [employer]. All rights reserved. This information is not intended as a substitute for professional medical care. Always follow your healthcare professional's instructions.

## 2021-04-23 NOTE — PROGRESS NOTES
Assessment & Plan     Blood in stool  Patient comes in with blood per rectum when wiping. Rectal examination - did not appreciate any mass , gloved finger did not show any blood in stool. Patient reassured, will like patient to get a colonoscopy.     Colon cancer screening  - GASTROENTEROLOGY ADULT REF PROCEDURE ONLY; Future      External hemorrhoids  Appreciated on examination. Recommend daily fiber- may use Preparation H as needed, surgery is the definitive treatment. Information given to patient on how to manage hemorrhoids.   [id number]     FUTURE APPOINTMENTS:       - Follow-up visit in one month or sooner as needed.  Patient Instructions     Patient Education   Colonoscopy  What You Should Know  Please arrive with an adult who can drive you home after the exam. This adult should and stay with you for the next 24 hours unless your provider says otherwise. The medicines used in the exam will make you sleepy. You will not be able to drive. You cannot take a bus or taxi by yourself.  What is a colonoscopy?  A colonoscopy lets the doctor look inside your large intestine (colon). The doctor guides a scope, or small camera, through the entire colon. The scope is attached to a long, flexible tube. The image from the scope appears on a large TV screen.   The scope will send air into your colon. This opens the colon so the doctor can see it better on the screen.  The exam looks for defects such as inflamed tissue, polyps, ulcers (sores), diverticula (pouches in the wall of the colon) and signs of cancer.  How do I prepare for the exam?  Read your guidelines for cleansing the colon. It is highly important that you follow the directions closely. If your colon is empty and clean, your exam will be more thorough and take less time.   Be sure to tell your doctor or nurse if you have ever had eye, lung or heart disease (including endocarditis or an artificial valve); diabetes; hepatitis; or any allergies to medicines.  Plan  to arrive on time for your exam.    Dress in comfortable, loose clothing.    Bring your insurance card. Leave your purse, billfold, credit cards and other valuables at home.    Bring a list of your medicines and known allergies. If you have a pacemaker or ICD, please bring your information card.    We do our best to stay on time, but there may be a delay. Please bring something to pass the time, such as a newspaper or book.  What happens when I arrive?    You will fill out some paperwork, then we will take you to a private area. A nurse will check your records and ask you questions.    You will change into a hospital gown. We may ask you to remove any jewelry.    We will review the risks and benefits of the exam. You will need to sign a consent form.    We will insert an IV (thin tube) into a vein in your hand or arm. We will use this to give you medicine.  What happens during the exam?  The exam takes from 15 minutes to one hour. You may ask questions of the doctor.    You will lie on your left side on a table.    You will receive medicines to relieve pain and help you relax.    The doctor will insert the scope into your rectum (bottom). The scope is on a long, thin tube. The doctor will slowly guide the scope into your colon. The tube bends, so it can move through the curves of your colon.    We may ask you to change positions to help the doctor move the scope.  If we see any polyps (growths), we will remove them. We do this because polyps can cause bleeding or turn into cancer. For some polyps, we will take tissue (a biopsy) to test in the lab. Most polyps turn out to be benign (not cancer).   Will it hurt?  You should feel little or no discomfort. The air will make you will feel full, and you will notice some pressure as the tube passes through your colon. Tell your doctor or nurse if you feel any pain. If you have cramps, breathe slowly to help your body relax.   What happens after the exam?    We will take you  to the recovery area. We will watch you for up to one hour or until most of the medicine has worn off.    We will remove the IV. You will receive a report about your exam.    You may feel bloated or crampy for a short time because of the air that was injected. You will need to be passing air before you go home.    Your first meal should be light. Slowly return to normal meals, unless your doctor gives you other orders. Take it easy the rest of the day.    If you had a polyp removed:  ? Avoid heavy exercise for one week (no heavy lifting, sports or other activity).  ? You may have bleeding for several days after your exam. Call your doctor if bleeding is heavy or you have black or bloody stools (bowel movements).  ? If you normally take aspirin or blood thinners, ask the prescribing doctor when you can start taking them again.    You may receive more than one bill for your exam. (Separate charges may come from the clinic, doctor, lab, etc.).  What if I need to change my appointment?  If you cannot keep your appointment, please call us as soon as you can. Our center is very busy, and we will need to contact the patient who comes after you.  For informational purposes only. Not to replace the advice of your health care provider. Copyright   2007 [employer]. All rights reserved. Clinically reviewed by Gastroenterology Steering Committee. about.me [id number] - REV 12/20.       Patient Education     Treating Hemorrhoids: Self-Care  Follow your healthcare provider s advice about caring for your hemorrhoids at home. Some treatments help relieve symptoms right away. Others involve making changes in your diet and exercise habits. These can help ease constipation and prevent hemorrhoids from coming back.   Relieving symptoms  Your healthcare provider may prescribe anti-inflammatory medicine to help ease your symptoms. These tips will also help relieve pain and swelling.     Take sitz baths. Taking a sitz bath means  sitting in a few inches of warm bath water. Soaking for 10 minutes twice a day can provide relief from painful hemorrhoids. It can also help the area stay clean.    Develop good bowel habits. Use the bathroom when you need to. Don t ignore the urge to move your bowels. This can lead to constipation, hard stools, and straining. Also, don t read while on the toilet. Sit only as long as needed. Wipe gently with soft, unscented toilet tissue or baby wipes.    Use ice packs. Placing an ice pack on an external hemorrhoid can help relieve pain right away. It will also help reduce the blood clot. Use the ice for 15 to 20 minutes at a time. Keep a cloth between the ice and your skin to prevent skin damage.    Use other measures. Laxatives and enemas can help ease constipation. But use them only on your healthcare provider s advice. For symptom relief, try using cotton pads soaked in witch hazel. These are available at most drugstores. Over-the-counter hemorrhoid ointments and petroleum jelly can also provide relief.  Add fiber to your diet    Adding fiber to your diet can help relieve constipation by making stools softer and easier to pass. To increase your fiber intake, your healthcare provider may recommend a bulking agent, such as psyllium. This is a high-fiber supplement available at most grocery stores and drugstores. Eating more fiber-rich foods will also help. There are two types of fiber:     Insoluble fiber is the main ingredient in bulking agents. It s also found in foods such as wheat bran, whole-grain breads, fresh fruits, and vegetables.    Soluble fiber is found in foods such as oat bran. Although soluble fiber is good for you, it may not ease constipation as much as foods high in insoluble fiber.  Drink more water  Along with a high-fiber diet, drinking more water can help ease constipation. This is because insoluble fiber absorbs water, making stools soft and bulky. Be sure to drink plenty of water throughout  the day. Drinking fruit juices, such as prune juice or apple juice, can also help prevent constipation.   Get more exercise  Regular exercise aids digestion and helps prevent constipation. It s also great for your health. So talk with your healthcare provider about starting an exercise program. Low-impact activities, such as swimming or walking, are good places to start. Take it easy at first. And remember to drink plenty of water when you exercise.   High-fiber foods Easy ways to add fiber  High-fiber foods offer many benefits. By making your stools softer, they help heal and prevent swollen hemorrhoids. They may also help reduce the risk of colon and rectal cancer. Best of all, they re usually low in calories and taste great. Here are some examples of fiber-rich foods.     Whole grains, such as wheat bran, corn bran, and brown rice    Vegetables, especially carrots, broccoli, cabbage, and peas    Fruits, such as apples, bananas, raisins, peaches, prunes, and pears    Nuts and legumes, especially peanuts, lentils, and kidney beans  Easy ways to add fiber  The tips below offer some simple ways to add more high-fiber foods to your meals.     Start your day with a high-fiber breakfast. Eat a wheat bran cereal along with a sliced banana. Or, try peanut butter on whole-wheat toast.    Eat carrot sticks for snacks. They re easy to prepare, taste great, and are low in calories.    Use whole-grain breads instead of white bread for sandwiches.    Eat fruits for treats. Try an apple and some raisins instead of a candy bar.  [employer] last reviewed this educational content on 6/1/2019 2000-2021 [employer]. All rights reserved. This information is not intended as a substitute for professional medical care. Always follow your healthcare professional's instructions.               No follow-ups on file.    Raj Almazan MD  [facility]    Yuval Romo is a 56 year old who  presents for the following health issues     HPI   Patient is a 56-year-old male presenting today for blood in his rectum.  He noticed that the last few months every time he wipes he has a little blood on his tissue.  He has also felt a bulge in the rectal area.  He has had occasional discomfort but not severe pain.  He has not been told he has hemorrhoids in the rectal area before.  He states his stools are sometimes hard sometimes soft.  He had a bout of Covid in February and during that time the rectal discomfort seemed more in the most severe because he was having loose stools.  He has never had a colonoscopy.  No abdominal pain or cramping.  No weight loss.  He is not aware of any colon cancer in his family.    Concern - patient had Covid in February. Patient has had rectal bleeding off/on with pain. Patient also has noticed discolored rings on fingernails since having covid  Onset: two months    Description:   As listed above    Intensity: intermittent rectal issues    Progression of Symptoms:  intermittent    Accompanying Signs & Symptoms:  burning    Previous history of similar problem:   Patient had covid     Precipitating factors:   Worsened by: nothing    Alleviating factors:  Improved by: tucks    Therapies Tried and outcome: tucks            Review of Systems   Constitutional: Negative.    HENT: Negative.    Eyes: Negative.    Respiratory: Negative.    Cardiovascular: Negative.    Gastrointestinal: Positive for hematochezia and rectal pain. Negative for abdominal pain, constipation, diarrhea, heartburn, nausea and vomiting.   Endocrine: Negative.    Genitourinary: Negative.    Musculoskeletal: Negative.    Skin: Negative.    Allergic/Immunologic: Negative.    Neurological: Negative.    Hematological: Negative.    Psychiatric/Behavioral: Negative.             Objective    /86   Pulse 85   Temp 97.4  F (36.3  C) (Tympanic)   Resp 14   Wt 86.5 kg (190 lb 12.8 oz)   SpO2 96%   BMI 25.17 kg/m     Body mass index is 25.17 kg/m .  Physical Exam  Constitutional:       General: He is not in acute distress.     Appearance: Normal appearance. He is not ill-appearing, toxic-appearing or diaphoretic.   HENT:      Head: Normocephalic and atraumatic.   Neck:      Musculoskeletal: Normal range of motion and neck supple.   Cardiovascular:      Rate and Rhythm: Normal rate and regular rhythm.      Pulses: Normal pulses.      Heart sounds: Normal heart sounds.   Pulmonary:      Effort: Pulmonary effort is normal.      Breath sounds: Normal breath sounds.   Abdominal:      General: Abdomen is flat. There is no distension.      Palpations: Abdomen is soft. There is no mass.      Tenderness: There is no abdominal tenderness. There is no guarding or rebound.      Hernia: No hernia is present.   Genitourinary:     Rectum: Guaiac result negative. External hemorrhoid present. No mass, tenderness, anal fissure or internal hemorrhoid.   Musculoskeletal: Normal range of motion.   Skin:     General: Skin is warm and dry.   Neurological:      Mental Status: He is alert and oriented to person, place, and time.   Psychiatric:         Mood and Affect: Mood normal.         Behavior: Behavior normal.

## 2021-05-08 DIAGNOSIS — Z11.59 ENCOUNTER FOR SCREENING FOR OTHER VIRAL DISEASES: ICD-10-CM

## 2021-05-17 ENCOUNTER — ANESTHESIA EVENT (OUTPATIENT)
Dept: GASTROENTEROLOGY | Facility: CLINIC | Age: 56
End: 2021-05-17
Payer: COMMERCIAL

## 2021-05-17 NOTE — ANESTHESIA PREPROCEDURE EVALUATION
Anesthesia Pre-Procedure Evaluation    Patient: Demetrius Burton   MRN: [id number] : 1965        Preoperative Diagnosis: Colon cancer screening [Z12.11]   Procedure : Procedure(s):  COLONOSCOPY     No past medical history on file.   No past surgical history on file.   No Known Allergies   Social History     Tobacco Use     Smoking status: Never Smoker     Smokeless tobacco: Never Used   Substance Use Topics     Alcohol use: Yes     Comment: rare      Wt Readings from Last 1 Encounters:   21 86.5 kg (190 lb 12.8 oz)        Anesthesia Evaluation   Pt has had prior anesthetic.         ROS/MED HX  ENT/Pulmonary:  - neg pulmonary ROS     Neurologic:  - neg neurologic ROS     Cardiovascular:  - neg cardiovascular ROS   (+) Dyslipidemia -----    METS/Exercise Tolerance:     Hematologic:  - neg hematologic  ROS     Musculoskeletal:  - neg musculoskeletal ROS     GI/Hepatic:  - neg GI/hepatic ROS     Renal/Genitourinary:  - neg Renal ROS     Endo: Comment: prediabetes - neg endo ROS     Psychiatric/Substance Use:  - neg psychiatric ROS     Infectious Disease:  - neg infectious disease ROS     Malignancy:  - neg malignancy ROS     Other:  - neg other ROS          Physical Exam    Airway        Mallampati: I   TM distance: > 3 FB   Neck ROM: full   Mouth opening: > 3 cm    Respiratory Devices and Support         Dental  no notable dental history         Cardiovascular   cardiovascular exam normal          Pulmonary   pulmonary exam normal                OUTSIDE LABS:  CBC:   Lab Results   Component Value Date    WBC 5.1 2006    HGB 15.8 2006    HCT 44.6 2006     2006     BMP:   Lab Results   Component Value Date     2009     10/30/2007    POTASSIUM 4.3 2009    POTASSIUM 4.2 10/30/2007    CHLORIDE 108 2009    CHLORIDE 106 10/30/2007    CO2 28 2009    CO2 27 10/30/2007    BUN 14 2009    BUN 15 10/30/2007    CR 1.03 2009    CR 1.29  10/30/2007    GLC 97 10/27/2017     (H) 11/04/2016     COAGS: No results found for: PTT, INR, FIBR  POC: No results found for: BGM, HCG, HCGS  HEPATIC:   Lab Results   Component Value Date    ALBUMIN 4.7 (H) 09/18/2006    PROTTOTAL 8.2 09/18/2006     (H) 09/18/2006    AST 75 (H) 09/18/2006    ALKPHOS 116 09/18/2006    BILITOTAL 0.7 09/18/2006     OTHER:   Lab Results   Component Value Date    NAMRATA 9.5 11/05/2009    TSH 2.59 09/18/2006       Anesthesia Plan    ASA Status:  2      Anesthesia Type: General.              Consents    Anesthesia Plan(s) and associated risks, benefits, and realistic alternatives discussed. Questions answered and patient/representative(s) expressed understanding.     - Discussed with:  Patient         Postoperative Care            Comments:                EDUARDO Mejia CRNA

## 2021-05-18 DIAGNOSIS — Z11.59 ENCOUNTER FOR SCREENING FOR OTHER VIRAL DISEASES: ICD-10-CM

## 2021-05-18 LAB
SARS-COV-2 RNA RESP QL NAA+PROBE: NORMAL
SPECIMEN SOURCE: NORMAL

## 2021-05-18 PROCEDURE — U0005 INFEC AGEN DETEC AMPLI PROBE: HCPCS | Performed by: SURGERY

## 2021-05-18 PROCEDURE — U0003 INFECTIOUS AGENT DETECTION BY NUCLEIC ACID (DNA OR RNA); SEVERE ACUTE RESPIRATORY SYNDROME CORONAVIRUS 2 (SARS-COV-2) (CORONAVIRUS DISEASE [COVID-19]), AMPLIFIED PROBE TECHNIQUE, MAKING USE OF HIGH THROUGHPUT TECHNOLOGIES AS DESCRIBED BY CMS-2020-01-R: HCPCS | Performed by: SURGERY

## 2021-05-19 LAB
LABORATORY COMMENT REPORT: NORMAL
SARS-COV-2 RNA RESP QL NAA+PROBE: NEGATIVE
SPECIMEN SOURCE: NORMAL

## 2021-05-21 ENCOUNTER — HOSPITAL ENCOUNTER (OUTPATIENT)
Facility: CLINIC | Age: 56
Discharge: HOME | End: 2021-05-21
Attending: SURGERY | Admitting: SURGERY
Payer: COMMERCIAL

## 2021-05-21 ENCOUNTER — ANESTHESIA (OUTPATIENT)
Dept: GASTROENTEROLOGY | Facility: CLINIC | Age: 56
End: 2021-05-21
Payer: COMMERCIAL

## 2021-05-21 VITALS
RESPIRATION RATE: 16 BRPM | HEART RATE: 67 BPM | TEMPERATURE: 97.7 F | BODY MASS INDEX: 25.18 KG/M2 | WEIGHT: 190 LBS | HEIGHT: 73 IN | SYSTOLIC BLOOD PRESSURE: 108 MMHG | OXYGEN SATURATION: 97 % | DIASTOLIC BLOOD PRESSURE: 77 MMHG

## 2021-05-21 LAB — COLONOSCOPY: NORMAL

## 2021-05-21 PROCEDURE — G0121 COLON CA SCRN NOT HI RSK IND: HCPCS | Performed by: SURGERY

## 2021-05-21 PROCEDURE — 45378 DIAGNOSTIC COLONOSCOPY: CPT | Performed by: SURGERY

## 2021-05-21 PROCEDURE — 250N000009 HC RX 250: Performed by: SURGERY

## 2021-05-21 PROCEDURE — 370N000017 HC ANESTHESIA TECHNICAL FEE, PER MIN: Performed by: SURGERY

## 2021-05-21 PROCEDURE — 250N000011 HC RX IP 250 OP 636: Performed by: NURSE ANESTHETIST, CERTIFIED REGISTERED

## 2021-05-21 PROCEDURE — 258N000003 HC RX IP 258 OP 636: Performed by: SURGERY

## 2021-05-21 RX ORDER — PROPOFOL 10 MG/ML
INJECTION, EMULSION INTRAVENOUS PRN
Status: DISCONTINUED | OUTPATIENT
Start: 2021-05-21 | End: 2021-05-21

## 2021-05-21 RX ORDER — SODIUM CHLORIDE, SODIUM LACTATE, POTASSIUM CHLORIDE, CALCIUM CHLORIDE 600; 310; 30; 20 MG/100ML; MG/100ML; MG/100ML; MG/100ML
INJECTION, SOLUTION INTRAVENOUS CONTINUOUS
Status: DISCONTINUED | OUTPATIENT
Start: 2021-05-21 | End: 2021-05-21 | Stop reason: HOSPADM

## 2021-05-21 RX ORDER — LIDOCAINE 40 MG/G
CREAM TOPICAL
Status: DISCONTINUED | OUTPATIENT
Start: 2021-05-21 | End: 2021-05-21 | Stop reason: HOSPADM

## 2021-05-21 RX ORDER — ONDANSETRON 2 MG/ML
4 INJECTION INTRAMUSCULAR; INTRAVENOUS
Status: DISCONTINUED | OUTPATIENT
Start: 2021-05-21 | End: 2021-05-21 | Stop reason: HOSPADM

## 2021-05-21 RX ADMIN — PROPOFOL 150 MG: 10 INJECTION, EMULSION INTRAVENOUS at 08:10

## 2021-05-21 RX ADMIN — PROPOFOL 150 MG: 10 INJECTION, EMULSION INTRAVENOUS at 08:08

## 2021-05-21 RX ADMIN — SODIUM CHLORIDE, POTASSIUM CHLORIDE, SODIUM LACTATE AND CALCIUM CHLORIDE: 600; 310; 30; 20 INJECTION, SOLUTION INTRAVENOUS at 07:29

## 2021-05-21 RX ADMIN — LIDOCAINE HYDROCHLORIDE 1 ML: 10 INJECTION, SOLUTION EPIDURAL; INFILTRATION; INTRACAUDAL; PERINEURAL at 07:28

## 2021-05-21 RX ADMIN — PROPOFOL 100 MG: 10 INJECTION, EMULSION INTRAVENOUS at 08:11

## 2021-05-21 ASSESSMENT — MIFFLIN-ST. JEOR: SCORE: 1750.71

## 2021-05-21 NOTE — H&P
"56 year old year old male here for colonoscopy for screening.    Patient Active Problem List   Diagnosis     Other specified forms of hearing loss     HYPERLIPIDEMIA LDL GOAL <160     Prediabetes       History reviewed. No pertinent past medical history.    History reviewed. No pertinent surgical history.    @[facility]@    No current outpatient medications on file.       No Known Allergies    Pt reports that he has never smoked. He has never used smokeless tobacco. He reports current alcohol use. He reports that he does not use drugs.    Exam:  /83 (BP Location: Right arm)   Pulse 66   Temp 97.7  F (36.5  C) (Oral)   Ht 1.854 m (6' 1\")   Wt 86.2 kg (190 lb)   SpO2 96%   BMI 25.07 kg/m      Awake, Alert OX3  Lungs - CTA bilaterally  CV - RRR, no murmurs, distal pulses intact  Abd - soft, non-distended, non-tender, +BS  Extr - No cyanosis or edema    A/P 56 year old year old male in need of colonoscopy for screening. Risks, benefits, alternatives, and complications were discussed including the possibility of perforation and the patient agreed to proceed    Hipolito Alexander MD   "

## 2021-05-21 NOTE — ANESTHESIA CARE TRANSFER NOTE
Patient: Demetrius Burton    Procedure(s):  COLONOSCOPY    Diagnosis: Colon cancer screening [Z12.11]  Diagnosis Additional Information: No value filed.    Anesthesia Type:   General     Note:      Level of Consciousness: awake          Vital Signs Stable: post-procedure vital signs reviewed and stable  Report to RN Given: handoff report given  Patient transferred to: Phase II    Handoff Report: Identifed the Patient, Identified the Reponsible Provider, Reviewed the pertinent medical history, Discussed the surgical course, Reviewed Intra-OP anesthesia mangement and issues during anesthesia, Set expectations for post-procedure period and Allowed opportunity for questions and acknowledgement of understanding      Vitals: (Last set prior to Anesthesia Care Transfer)  CRNA VITALS  5/21/2021 0758 - 5/21/2021 0828      5/21/2021             Pulse:  74    SpO2:  91 %        Electronically Signed By: EDUARDO Hernandez CRNA  May 21, 2021  8:28 AM

## 2021-05-21 NOTE — BRIEF OP NOTE
[facility]   Brief Operative Note    Pre-operative diagnosis: Colon cancer screening [Z12.11]   Post-operative diagnosis normal colon, mild hemorrhoids     Procedure: Procedure(s):  COLONOSCOPY   Surgeon(s): Surgeon(s) and Role:     * Hipolito Alexander MD - Primary   Estimated blood loss: * No values recorded between 5/21/2021  8:10 AM and 5/21/2021  8:26 AM *    Specimens: * No specimens in log *   Findings: 1. Normal colon  2. Hemorrhoids - mild

## 2021-05-21 NOTE — ANESTHESIA POSTPROCEDURE EVALUATION
Patient: Demetrius Burton    Procedure(s):  COLONOSCOPY    Diagnosis:Colon cancer screening [Z12.11]  Diagnosis Additional Information: No value filed.    Anesthesia Type:  General    Note:  Disposition: Outpatient   Postop Pain Control: Uneventful            Sign Out: Well controlled pain   PONV: No   Neuro/Psych: Uneventful            Sign Out: Acceptable/Baseline neuro status   Airway/Respiratory: Uneventful            Sign Out: Acceptable/Baseline resp. status   CV/Hemodynamics: Uneventful            Sign Out: Acceptable CV status; No obvious hypovolemia; No obvious fluid overload   Other NRE: NONE   DID A NON-ROUTINE EVENT OCCUR? No           Last vitals:  Vitals:    05/21/21 0700 05/21/21 0830 05/21/21 0840   BP: 128/83 112/79 114/81   Pulse: 66 72 68   Resp:  16 16   Temp: 36.5  C (97.7  F)     SpO2: 96% 94% 95%       Last vitals prior to Anesthesia Care Transfer:  CRNA VITALS  5/21/2021 0758 - 5/21/2021 0846      5/21/2021             Pulse:  74    SpO2:  91 %          Electronically Signed By: EDUARDO Hrenandez CRNA  May 21, 2021  8:46 AM

## 2021-09-18 ENCOUNTER — HEALTH MAINTENANCE LETTER (OUTPATIENT)
Age: 56
End: 2021-09-18

## 2022-01-08 ENCOUNTER — HEALTH MAINTENANCE LETTER (OUTPATIENT)
Age: 57
End: 2022-01-08

## 2022-11-19 ENCOUNTER — HEALTH MAINTENANCE LETTER (OUTPATIENT)
Age: 57
End: 2022-11-19

## 2023-04-09 ENCOUNTER — HEALTH MAINTENANCE LETTER (OUTPATIENT)
Age: 58
End: 2023-04-09

## 2023-05-05 ENCOUNTER — OFFICE VISIT (OUTPATIENT)
Dept: FAMILY MEDICINE | Facility: CLINIC | Age: 58
End: 2023-05-05
Payer: COMMERCIAL

## 2023-05-05 VITALS
BODY MASS INDEX: 23.49 KG/M2 | TEMPERATURE: 98.4 F | HEIGHT: 74 IN | WEIGHT: 183 LBS | HEART RATE: 80 BPM | DIASTOLIC BLOOD PRESSURE: 85 MMHG | RESPIRATION RATE: 18 BRPM | SYSTOLIC BLOOD PRESSURE: 138 MMHG | OXYGEN SATURATION: 98 %

## 2023-05-05 DIAGNOSIS — R73.03 PREDIABETES: ICD-10-CM

## 2023-05-05 DIAGNOSIS — Z11.59 NEED FOR HEPATITIS C SCREENING TEST: ICD-10-CM

## 2023-05-05 DIAGNOSIS — Z00.00 ROUTINE GENERAL MEDICAL EXAMINATION AT A HEALTH CARE FACILITY: Primary | ICD-10-CM

## 2023-05-05 DIAGNOSIS — Z11.4 SCREENING FOR HIV (HUMAN IMMUNODEFICIENCY VIRUS): ICD-10-CM

## 2023-05-05 DIAGNOSIS — Z12.5 SCREENING FOR PROSTATE CANCER: ICD-10-CM

## 2023-05-05 DIAGNOSIS — E78.5 HYPERLIPIDEMIA LDL GOAL <160: ICD-10-CM

## 2023-05-05 DIAGNOSIS — Z12.11 COLON CANCER SCREENING: ICD-10-CM

## 2023-05-05 LAB
CHOLEST SERPL-MCNC: 131 MG/DL
HBA1C MFR BLD: 9.5 % (ref 0–5.6)
HDLC SERPL-MCNC: 36 MG/DL
LDLC SERPL CALC-MCNC: 72 MG/DL
NONHDLC SERPL-MCNC: 95 MG/DL
PSA SERPL DL<=0.01 NG/ML-MCNC: 0.37 NG/ML (ref 0–3.5)
TRIGL SERPL-MCNC: 117 MG/DL

## 2023-05-05 PROCEDURE — 83036 HEMOGLOBIN GLYCOSYLATED A1C: CPT | Performed by: STUDENT IN AN ORGANIZED HEALTH CARE EDUCATION/TRAINING PROGRAM

## 2023-05-05 PROCEDURE — 80061 LIPID PANEL: CPT | Performed by: STUDENT IN AN ORGANIZED HEALTH CARE EDUCATION/TRAINING PROGRAM

## 2023-05-05 PROCEDURE — 87389 HIV-1 AG W/HIV-1&-2 AB AG IA: CPT | Performed by: STUDENT IN AN ORGANIZED HEALTH CARE EDUCATION/TRAINING PROGRAM

## 2023-05-05 PROCEDURE — 86803 HEPATITIS C AB TEST: CPT | Performed by: STUDENT IN AN ORGANIZED HEALTH CARE EDUCATION/TRAINING PROGRAM

## 2023-05-05 PROCEDURE — 99396 PREV VISIT EST AGE 40-64: CPT | Performed by: STUDENT IN AN ORGANIZED HEALTH CARE EDUCATION/TRAINING PROGRAM

## 2023-05-05 PROCEDURE — 99213 OFFICE O/P EST LOW 20 MIN: CPT | Mod: 25 | Performed by: STUDENT IN AN ORGANIZED HEALTH CARE EDUCATION/TRAINING PROGRAM

## 2023-05-05 PROCEDURE — 36415 COLL VENOUS BLD VENIPUNCTURE: CPT | Performed by: STUDENT IN AN ORGANIZED HEALTH CARE EDUCATION/TRAINING PROGRAM

## 2023-05-05 PROCEDURE — G0103 PSA SCREENING: HCPCS | Performed by: STUDENT IN AN ORGANIZED HEALTH CARE EDUCATION/TRAINING PROGRAM

## 2023-05-05 ASSESSMENT — ENCOUNTER SYMPTOMS
ARTHRALGIAS: 1
FEVER: 0
MYALGIAS: 1
HEMATOCHEZIA: 0
HEMATURIA: 0
HEADACHES: 0
NAUSEA: 0
FREQUENCY: 0
COUGH: 0
EYE PAIN: 0
DIZZINESS: 0
CHILLS: 0
JOINT SWELLING: 0
HEARTBURN: 0
NERVOUS/ANXIOUS: 0
DIARRHEA: 0
CONSTIPATION: 0
DYSURIA: 0
ABDOMINAL PAIN: 0
PALPITATIONS: 0
PARESTHESIAS: 0
SORE THROAT: 0
WEAKNESS: 0
SHORTNESS OF BREATH: 0

## 2023-05-05 ASSESSMENT — PAIN SCALES - GENERAL: PAINLEVEL: NO PAIN (0)

## 2023-05-05 NOTE — PROGRESS NOTES
SUBJECTIVE:   CC: Demetrius is an 58 year old who presents for preventative health visit.       5/5/2023     1:29 PM   Additional Questions   Roomed by Evonne SHEN LPN   Accompanied by self         5/5/2023     1:29 PM   Patient Reported Additional Medications   Patient reports taking the following new medications Multi vitamin daily     Patient has been advised of split billing requirements and indicates understanding: Yes  Healthy Habits:     Getting at least 3 servings of Calcium per day:  Yes    Bi-annual eye exam:  NO    Dental care twice a year:  Yes    Sleep apnea or symptoms of sleep apnea:  None    Diet:  Regular (no restrictions)    Frequency of exercise:  1 day/week    Duration of exercise:  Less than 15 minutes    Taking medications regularly:  Yes    Barriers to taking medications:  None    Medication side effects:  None    PHQ-2 Total Score: 0    Additional concerns today:  No    Today's PHQ-2 Score:       5/5/2023     1:27 PM   PHQ-2 ( 1999 [employer])   Q1: Little interest or pleasure in doing things 0   Q2: Feeling down, depressed or hopeless 0   PHQ-2 Score 0   Q1: Little interest or pleasure in doing things Not at all    Not at all   Q2: Feeling down, depressed or hopeless Not at all    Not at all   PHQ-2 Score 0    0     Have you ever done Advance Care Planning? (For example, a Health Directive, POLST, or a discussion with a medical provider or your loved ones about your wishes): Yes, patient states has an Advance Care Planning document and will bring a copy to the clinic.    Social History     Tobacco Use     Smoking status: Never     Smokeless tobacco: Never   Vaping Use     Vaping status: Never Used   Substance Use Topics     Alcohol use: Yes     Comment: rare             5/5/2023     1:27 PM   Alcohol Use   Prescreen: >3 drinks/day or >7 drinks/week? No       Last PSA: No results found for: PSA    Reviewed orders with patient. Reviewed health maintenance and updated orders accordingly - Yes  Lab work  "is in process    Reviewed and updated as needed this visit by clinical staff   Tobacco  Allergies  Meds              Reviewed and updated as needed this visit by Provider                   Review of Systems   Constitutional: Negative for chills and fever.   HENT: Positive for hearing loss. Negative for congestion, ear pain and sore throat.    Eyes: Negative for pain and visual disturbance.   Respiratory: Negative for cough and shortness of breath.    Cardiovascular: Negative for chest pain, palpitations and peripheral edema.   Gastrointestinal: Negative for abdominal pain, constipation, diarrhea, heartburn, hematochezia and nausea.   Genitourinary: Negative for dysuria, frequency, genital sores, hematuria, impotence, penile discharge and urgency.   Musculoskeletal: Positive for arthralgias and myalgias. Negative for joint swelling.   Skin: Negative for rash.   Neurological: Negative for dizziness, weakness, headaches and paresthesias.   Psychiatric/Behavioral: Negative for mood changes. The patient is not nervous/anxious.        OBJECTIVE:   /85 (BP Location: Right arm, Patient Position: Sitting, Cuff Size: Adult Regular)   Pulse 80   Temp 98.4  F (36.9  C) (Tympanic)   Resp 18   Ht 1.875 m (6' 1.82\")   Wt 83 kg (183 lb)   SpO2 98%   BMI 23.61 kg/m      Physical Exam  Constitutional:       General: He is not in acute distress.  HENT:      Head: Normocephalic and atraumatic.      Right Ear: External ear normal.      Left Ear: External ear normal.      Mouth/Throat:      Mouth: Mucous membranes are moist.      Pharynx: Oropharynx is clear. No oropharyngeal exudate or posterior oropharyngeal erythema.   Eyes:      Extraocular Movements: Extraocular movements intact.   Cardiovascular:      Rate and Rhythm: Normal rate and regular rhythm.      Heart sounds: Normal heart sounds.   Pulmonary:      Effort: Pulmonary effort is normal. No respiratory distress.      Breath sounds: Normal breath sounds. No " wheezing or rhonchi.   Abdominal:      Palpations: Abdomen is soft. There is no mass.      Tenderness: There is no abdominal tenderness.   Musculoskeletal:         General: No deformity. Normal range of motion.      Cervical back: Normal range of motion and neck supple.   Skin:     General: Skin is warm.      Findings: No rash.   Neurological:      General: No focal deficit present.      Mental Status: He is alert and oriented to person, place, and time.   Psychiatric:         Mood and Affect: Mood normal.         ASSESSMENT/PLAN:     1. Routine general medical examination at a health care facility  2. Screening for HIV (human immunodeficiency virus)  - HIV Antigen Antibody Combo; Future    3. Need for hepatitis C screening test  - Hepatitis C Screen Reflex to HCV RNA Quant and Genotype; Future    4. Hyperlipidemia LDL goal <160  - Lipid panel reflex to direct LDL Non-fasting; Future    5. Colon cancer screening  > had a colonoscopy in 2021 results were within normal limits, patient to follow up in 10 years in 2031     6. Screening for prostate cancer  - PSA, screen; Future    7. Prediabetes  - Hemoglobin A1c; Future       Patient has been advised of split billing requirements and indicates understanding: Yes      COUNSELING:   Reviewed preventive health counseling, as reflected in patient instructions        He reports that he has never smoked. He has never used smokeless tobacco.            CK POWELL MD  [facility]

## 2023-05-06 LAB
HCV AB SERPL QL IA: NONREACTIVE
HIV 1+2 AB+HIV1 P24 AG SERPL QL IA: NONREACTIVE

## 2023-05-07 NOTE — RESULT ENCOUNTER NOTE
Hello, could someone please scheduled this patient to see me in clinic ASAP to discuss next steps for a new diagnosis of type 2 diabetes? Thank you so much!     Dear Demetrius Burton,     It is a pleasure providing you with medical care. I have received and reviewed your lab results, and have the following recommendations:     Based on the results of your A1c you have a formal diagnosis of type 2 diabetes. I kindly ask that you make an appointment with me to discuss next steps as you will likely have to be started on insulin, get a referral to diabetic education, and we need to have a thorough discussion about diet/exercise. I know this is a scary diagnosis to have, but if we work together I trust we can find ways to keep your sugar values within safe ranges to prevent your risk for diabetic complications.     Your PSA, Hep C, and HIV values were within normal limits. You are not suspected to have prostate cancer.     You did have a decreased level of your healthy cholesterol. At this time, I would recommend limiting foods that are high in trans fats and saturated fats.     Sincerely,     Alfreda Salas MD

## 2023-05-08 ENCOUNTER — TELEPHONE (OUTPATIENT)
Dept: FAMILY MEDICINE | Facility: CLINIC | Age: 58
End: 2023-05-08
Payer: COMMERCIAL

## 2023-05-08 NOTE — PROGRESS NOTES
Demetrius called back. Writer read note from MD. Demetrius will schedule appt on [facility].  Piedad Edmondsleo on 5/8/2023 at 10:01 AM

## 2023-05-15 ENCOUNTER — OFFICE VISIT (OUTPATIENT)
Dept: FAMILY MEDICINE | Facility: CLINIC | Age: 58
End: 2023-05-15
Payer: COMMERCIAL

## 2023-05-15 VITALS
SYSTOLIC BLOOD PRESSURE: 124 MMHG | WEIGHT: 185.4 LBS | DIASTOLIC BLOOD PRESSURE: 80 MMHG | RESPIRATION RATE: 16 BRPM | TEMPERATURE: 98.4 F | HEIGHT: 74 IN | BODY MASS INDEX: 23.79 KG/M2 | OXYGEN SATURATION: 98 % | HEART RATE: 75 BPM

## 2023-05-15 DIAGNOSIS — E11.9 NEWLY DIAGNOSED DIABETES (H): Primary | ICD-10-CM

## 2023-05-15 DIAGNOSIS — E11.9 TYPE 2 DIABETES MELLITUS WITHOUT COMPLICATION, WITHOUT LONG-TERM CURRENT USE OF INSULIN (H): ICD-10-CM

## 2023-05-15 PROCEDURE — 99214 OFFICE O/P EST MOD 30 MIN: CPT | Performed by: STUDENT IN AN ORGANIZED HEALTH CARE EDUCATION/TRAINING PROGRAM

## 2023-05-15 RX ORDER — METFORMIN HCL 500 MG
500 TABLET, EXTENDED RELEASE 24 HR ORAL
Qty: 60 TABLET | Refills: 0 | Status: SHIPPED | OUTPATIENT
Start: 2023-05-15 | End: 2023-06-01

## 2023-05-15 RX ORDER — ATORVASTATIN CALCIUM 40 MG/1
40 TABLET, FILM COATED ORAL DAILY
Qty: 90 TABLET | Refills: 3 | Status: SHIPPED | OUTPATIENT
Start: 2023-05-15 | End: 2024-03-27

## 2023-05-15 ASSESSMENT — PAIN SCALES - GENERAL: PAINLEVEL: NO PAIN (0)

## 2023-05-15 NOTE — PROGRESS NOTES
1. Newly diagnosed diabetes (H), not well controlled   2. Type 2 diabetes mellitus without complication, without long-term current use of insulin (H)  > Recent A1c was 9.5, patient has never been diagnosed with diabetes before  > Patient himself has already made dietary changes, he is currently on a vegan diet like his wife  > Based on the results of his fasting glucose with the current lifestyle changes he has made I suspect that his A1c will likely drop below 9 as long as he is consistent with his lifestyle changes  > Cooperative decision making was had, patient at this time would like to attempt dietary and lifestyle changes with pill based medication management for diabetes rather than starting insulin  -Originally was thinking about having patient placed on a GLP-1 agonist, however he admits that he would rather be on medication that does not involve needles  - plan to start metFORMIN (GLUCOPHAGE XR) 500 MG 24 hr tablet; Take 1 tablet (500 mg) by mouth daily (with dinner)  Dispense: 60 tablet; Refill: 0  - plan to start empagliflozin (JARDIANCE) 10 MG TABS tablet; Take 1 tablet (10 mg) by mouth daily  Dispense: 90 tablet; Refill: 1  - AMB Adult Diabetes Educator Referral; Future  - plan to start atorvastatin (LIPITOR) 40 MG tablet; Take 1 tablet (40 mg) by mouth daily  Dispense: 90 tablet; Refill: 3  -Discussed diet and lifestyle changes including limiting carbohydrate intake as well as exercising at least 30 minutes continuously 3 times per week preferably cardiovascular based exercises      Yuval Romo is a 58 year old, presenting for the following health issues:  Diabetes        5/15/2023     3:21 PM   Additional Questions   Roomed by Evonne SHEN LPN   Accompanied by self         5/15/2023     3:21 PM   Patient Reported Additional Medications   Patient reports taking the following new medications Multi vitamin daily     History of Present Illness       Diabetes:   He presents for follow up of  "diabetes.  He is checking home blood glucose one time daily. He checks blood glucose before meals.  Blood glucose is never over 200 and never under 70. When his blood glucose is low, the patient is asymptomatic for confusion, blurred vision, lethargy and reports not feeling dizzy, shaky, or weak.  He has no concerns regarding his diabetes at this time.  He is having numbness in feet and weight loss.         He eats 2-3 servings of fruits and vegetables daily.He consumes 0 sweetened beverage(s) daily.He exercises with enough effort to increase his heart rate 9 or less minutes per day.  He exercises with enough effort to increase his heart rate 3 or less days per week.   He is taking medications regularly.     Checks his BG when he wakes up in the morning   This morning he was at 138   BG ranges were between 115-205    Review of Systems   As above       Objective    /80 (BP Location: Right arm, Patient Position: Sitting, Cuff Size: Adult Large)   Pulse 75   Temp 98.4  F (36.9  C) (Tympanic)   Resp 16   Ht 1.875 m (6' 1.82\")   Wt 84.1 kg (185 lb 6.4 oz)   SpO2 98%   BMI 23.92 kg/m    Body mass index is 23.92 kg/m .  Physical Exam  Constitutional:       General: He is not in acute distress.  HENT:      Head: Normocephalic and atraumatic.      Right Ear: External ear normal.      Left Ear: External ear normal.      Mouth/Throat:      Mouth: Mucous membranes are moist.      Pharynx: Oropharynx is clear. No oropharyngeal exudate or posterior oropharyngeal erythema.   Eyes:      Extraocular Movements: Extraocular movements intact.   Cardiovascular:      Rate and Rhythm: Normal rate and regular rhythm.      Heart sounds: Normal heart sounds.   Pulmonary:      Effort: Pulmonary effort is normal. No respiratory distress.      Breath sounds: Normal breath sounds. No wheezing or rhonchi.   Abdominal:      Palpations: Abdomen is soft. There is no mass.      Tenderness: There is no abdominal tenderness. "   Musculoskeletal:         General: No deformity. Normal range of motion.      Cervical back: Normal range of motion and neck supple.   Skin:     General: Skin is warm.      Findings: No rash.   Neurological:      General: No focal deficit present.      Mental Status: He is alert and oriented to person, place, and time.   Psychiatric:         Mood and Affect: Mood normal.

## 2023-05-29 DIAGNOSIS — E11.9 NEWLY DIAGNOSED DIABETES (H): ICD-10-CM

## 2023-05-31 NOTE — TELEPHONE ENCOUNTER
Pending Prescriptions:                       Disp   Refills    metFORMIN (GLUCOPHAGE XR) 500 MG 24 hr tab*90 tab*1        Sig: TAKE 1 TABLET BY MOUTH EVERY DAY WITH DINNER    Routing refill request to provider for review/approval because:  Labs not current:  Creatinine    Shirley Kuhn RN  [facility]

## 2023-06-01 DIAGNOSIS — E11.9 NEWLY DIAGNOSED DIABETES (H): Primary | ICD-10-CM

## 2023-06-01 RX ORDER — METFORMIN HCL 500 MG
TABLET, EXTENDED RELEASE 24 HR ORAL
Qty: 90 TABLET | Refills: 1 | Status: SHIPPED | OUTPATIENT
Start: 2023-06-01 | End: 2023-08-14

## 2023-06-12 ENCOUNTER — OFFICE VISIT (OUTPATIENT)
Dept: FAMILY MEDICINE | Facility: CLINIC | Age: 58
End: 2023-06-12
Payer: COMMERCIAL

## 2023-06-12 VITALS
OXYGEN SATURATION: 95 % | WEIGHT: 183.9 LBS | BODY MASS INDEX: 23.6 KG/M2 | TEMPERATURE: 98.4 F | HEIGHT: 74 IN | RESPIRATION RATE: 18 BRPM | HEART RATE: 92 BPM | DIASTOLIC BLOOD PRESSURE: 68 MMHG | SYSTOLIC BLOOD PRESSURE: 106 MMHG

## 2023-06-12 DIAGNOSIS — M25.512 ACUTE PAIN OF LEFT SHOULDER: ICD-10-CM

## 2023-06-12 DIAGNOSIS — E11.40 TYPE 2 DIABETES MELLITUS WITH DIABETIC NEUROPATHY, WITHOUT LONG-TERM CURRENT USE OF INSULIN (H): Primary | ICD-10-CM

## 2023-06-12 PROCEDURE — 99214 OFFICE O/P EST MOD 30 MIN: CPT | Performed by: STUDENT IN AN ORGANIZED HEALTH CARE EDUCATION/TRAINING PROGRAM

## 2023-06-12 ASSESSMENT — PAIN SCALES - GENERAL: PAINLEVEL: NO PAIN (0)

## 2023-06-12 NOTE — PROGRESS NOTES
1. Type 2 diabetes mellitus with diabetic neuropathy, without long-term current use of insulin (H)  > patient has not picked up his prescriptions for Metformin, Jardiance, or Lipitor   -Informed the patient patient that with his diabetes he is at high risk for heart attack and stroke which is why he was previously prescribed Lipitor   - patient states that he was not compliant with his medications because his wife wanted him to be on vegan diet and make lifestyle changes before taking medications  - I reiterated to the patient that it is important for him to take metformin and Jardiance, to help keep his glucose levels well controlled and to prevent worsening of his diabetes  -Patient appears to have made good progress with dietary changes, I did encourage him to do cardiovascular based exercises for at least 30 minutes straight at least 3 times per week    2. Acute pain of left shoulder  > Physical exam findings show that patient did not need significant tenderness to palpation along lateral joint    - patient has had steroid injection x1 in 2017 in the left shoulder   -At this time encourage conservative care with rest, heat, and over-the-counter analgesics as tolerated    *The risks, benefits and treatment options of prescribed medications or other treatments have been discussed with the patient. The patient verbalized their understanding and should call or follow up if no improvement or if they develop further problems     Follow up plan:   - Plan to have patient follow-up in 2 months, at that visit can assess shoulder, if he continues to have pain in his left shoulder he may benefit from steroid injection  -Plan to get repeat A1c, foot exam, and diabetic eye exam referral at next visit    Yuval Romo is a 58 year old, presenting for the following health issues:  Diabetes and Musculoskeletal Problem (Right shoulder pain - would like to get a cortisone shot today, injured while helping his son move over  the weekend/)        6/12/2023     2:28 PM   Additional Questions   Roomed by Evonne SHEN LPN   Accompanied by self         6/12/2023     2:28 PM   Patient Reported Additional Medications   Patient reports taking the following new medications no new meds     History of Present Illness       Diabetes:   He presents for follow up of diabetes.  He is checking home blood glucose one time daily. He checks blood glucose before meals.  Blood glucose is never over 200 and never under 70. When his blood glucose is low, the patient is asymptomatic for confusion, blurred vision, lethargy and reports not feeling dizzy, shaky, or weak.  He is concerned about other (hasn't been using any medications, states his wife wants him to try changing diet to improve sugars first).  He is having numbness in feet and weight loss. The patient has not had a diabetic eye exam in the last 12 months.         He eats 2-3 servings of fruits and vegetables daily.He consumes 1 sweetened beverage(s) daily.He exercises with enough effort to increase his heart rate 9 or less minutes per day.  He exercises with enough effort to increase his heart rate 3 or less days per week.   He is taking medications regularly.    Checks BG once daily before breakfast, reported glucose during those times are in the high 120s   Hasn't been taking his medications, wife wanted him to try a vegan diet instead   In the last 2 weeks his BG have not been above 200, but before that   Started walking on the treadmill a little bit but admits not regularly    Admits that his numbness and tingling in his feet have been improving    Has cut back on bran cereal, is eating more eggs and two homemade sausages in the morning     Joint Pain    Onset: Since Saturday - wants to get a cortisone injection    Description:   Location: left shoulder  Character: Dull ache and throbbing    Intensity: moderate, 0/10 currently at it's worst 6/10    Progression of Symptoms: same    Accompanying  "Signs & Symptoms:  Other symptoms: none    History:   Previous similar pain: YES- stated he got cortisone shots in the past 5 years or so      Precipitating factors:   Trauma or overuse: YES- helping his son move over the weekend    Alleviating factors:  Improved by: rest/inactivity, ice and acetaminophen  Therapies Tried and outcome: see above    Review of Systems   As above       Objective    /68 (BP Location: Left arm, Patient Position: Sitting, Cuff Size: Adult Regular)   Pulse 92   Temp 98.4  F (36.9  C) (Tympanic)   Resp 18   Ht 1.875 m (6' 1.8\")   Wt 83.4 kg (183 lb 14.4 oz)   SpO2 95%   BMI 23.74 kg/m    Body mass index is 23.74 kg/m .  Physical Exam  Constitutional:       General: He is not in acute distress.     Appearance: Normal appearance.   HENT:      Head: Normocephalic.   Eyes:      General: No scleral icterus.        Right eye: No discharge.         Left eye: No discharge.      Extraocular Movements: Extraocular movements intact.      Conjunctiva/sclera: Conjunctivae normal.   Pulmonary:      Effort: Pulmonary effort is normal. No respiratory distress.   Musculoskeletal:         General: No swelling or deformity. Normal range of motion.      Cervical back: Normal range of motion.      Comments: Upon palpation of the left shoulder, patient did not endorse significant tenderness to palpation    Skin:     General: Skin is warm.      Findings: No rash.      Comments: No rash on exposed skin   Neurological:      General: No focal deficit present.      Mental Status: He is alert and oriented to person, place, and time.   Psychiatric:         Mood and Affect: Mood normal.         Behavior: Behavior normal.                         "

## 2023-08-14 ENCOUNTER — OFFICE VISIT (OUTPATIENT)
Dept: FAMILY MEDICINE | Facility: CLINIC | Age: 58
End: 2023-08-14
Payer: COMMERCIAL

## 2023-08-14 VITALS
DIASTOLIC BLOOD PRESSURE: 82 MMHG | TEMPERATURE: 97.8 F | OXYGEN SATURATION: 100 % | SYSTOLIC BLOOD PRESSURE: 130 MMHG | RESPIRATION RATE: 18 BRPM | HEART RATE: 70 BPM | WEIGHT: 177.3 LBS | BODY MASS INDEX: 22.75 KG/M2 | HEIGHT: 74 IN

## 2023-08-14 DIAGNOSIS — E11.9 TYPE 2 DIABETES MELLITUS WITHOUT COMPLICATION, WITHOUT LONG-TERM CURRENT USE OF INSULIN (H): Primary | ICD-10-CM

## 2023-08-14 DIAGNOSIS — N52.9 ERECTILE DYSFUNCTION, UNSPECIFIED ERECTILE DYSFUNCTION TYPE: ICD-10-CM

## 2023-08-14 DIAGNOSIS — E11.9 NEWLY DIAGNOSED DIABETES (H): ICD-10-CM

## 2023-08-14 DIAGNOSIS — R79.89 ELEVATED TESTOSTERONE LEVEL: ICD-10-CM

## 2023-08-14 LAB
ALBUMIN SERPL BCG-MCNC: 4.4 G/DL (ref 3.5–5.2)
ALP SERPL-CCNC: 156 U/L (ref 40–129)
ALT SERPL W P-5'-P-CCNC: 84 U/L (ref 0–70)
ANION GAP SERPL CALCULATED.3IONS-SCNC: 9 MMOL/L (ref 7–15)
AST SERPL W P-5'-P-CCNC: 70 U/L (ref 0–45)
BILIRUB SERPL-MCNC: 1.5 MG/DL
BUN SERPL-MCNC: 20.8 MG/DL (ref 6–20)
CALCIUM SERPL-MCNC: 9.6 MG/DL (ref 8.6–10)
CHLORIDE SERPL-SCNC: 106 MMOL/L (ref 98–107)
CREAT SERPL-MCNC: 0.73 MG/DL (ref 0.67–1.17)
CREAT UR-MCNC: 142.8 MG/DL
DEPRECATED HCO3 PLAS-SCNC: 26 MMOL/L (ref 22–29)
GFR SERPL CREATININE-BSD FRML MDRD: >90 ML/MIN/1.73M2
GLUCOSE SERPL-MCNC: 89 MG/DL (ref 70–99)
HBA1C MFR BLD: 5.3 % (ref 0–5.6)
MICROALBUMIN UR-MCNC: <12 MG/L
MICROALBUMIN/CREAT UR: NORMAL MG/G{CREAT}
POTASSIUM SERPL-SCNC: 3.8 MMOL/L (ref 3.4–5.3)
PROT SERPL-MCNC: 7.5 G/DL (ref 6.4–8.3)
SODIUM SERPL-SCNC: 141 MMOL/L (ref 136–145)
TSH SERPL DL<=0.005 MIU/L-ACNC: 2.61 UIU/ML (ref 0.3–4.2)

## 2023-08-14 PROCEDURE — 84443 ASSAY THYROID STIM HORMONE: CPT | Performed by: STUDENT IN AN ORGANIZED HEALTH CARE EDUCATION/TRAINING PROGRAM

## 2023-08-14 PROCEDURE — 85027 COMPLETE CBC AUTOMATED: CPT | Performed by: STUDENT IN AN ORGANIZED HEALTH CARE EDUCATION/TRAINING PROGRAM

## 2023-08-14 PROCEDURE — 80053 COMPREHEN METABOLIC PANEL: CPT | Performed by: STUDENT IN AN ORGANIZED HEALTH CARE EDUCATION/TRAINING PROGRAM

## 2023-08-14 PROCEDURE — 99214 OFFICE O/P EST MOD 30 MIN: CPT | Performed by: STUDENT IN AN ORGANIZED HEALTH CARE EDUCATION/TRAINING PROGRAM

## 2023-08-14 PROCEDURE — 83036 HEMOGLOBIN GLYCOSYLATED A1C: CPT | Performed by: STUDENT IN AN ORGANIZED HEALTH CARE EDUCATION/TRAINING PROGRAM

## 2023-08-14 PROCEDURE — 99207 PR FOOT EXAM NO CHARGE: CPT | Performed by: STUDENT IN AN ORGANIZED HEALTH CARE EDUCATION/TRAINING PROGRAM

## 2023-08-14 PROCEDURE — 82043 UR ALBUMIN QUANTITATIVE: CPT | Performed by: STUDENT IN AN ORGANIZED HEALTH CARE EDUCATION/TRAINING PROGRAM

## 2023-08-14 PROCEDURE — 36415 COLL VENOUS BLD VENIPUNCTURE: CPT | Performed by: STUDENT IN AN ORGANIZED HEALTH CARE EDUCATION/TRAINING PROGRAM

## 2023-08-14 PROCEDURE — 82570 ASSAY OF URINE CREATININE: CPT | Performed by: STUDENT IN AN ORGANIZED HEALTH CARE EDUCATION/TRAINING PROGRAM

## 2023-08-14 PROCEDURE — 84403 ASSAY OF TOTAL TESTOSTERONE: CPT | Performed by: STUDENT IN AN ORGANIZED HEALTH CARE EDUCATION/TRAINING PROGRAM

## 2023-08-14 RX ORDER — METFORMIN HCL 500 MG
500 TABLET, EXTENDED RELEASE 24 HR ORAL
Qty: 90 TABLET | Refills: 3 | Status: SHIPPED | OUTPATIENT
Start: 2023-08-14 | End: 2024-09-24

## 2023-08-14 RX ORDER — ATORVASTATIN CALCIUM 40 MG/1
40 TABLET, FILM COATED ORAL DAILY
Qty: 90 TABLET | Refills: 3 | Status: CANCELLED | OUTPATIENT
Start: 2023-08-14

## 2023-08-14 ASSESSMENT — PAIN SCALES - GENERAL: PAINLEVEL: NO PAIN (0)

## 2023-08-14 NOTE — PROGRESS NOTES
1. Type 2 diabetes mellitus without complication, without long-term current use of insulin (H)  2. Newly diagnosed diabetes (H)  > patient states he has cut out most dairy from his diet and has lost about 6 pounds since his last visit 2 months ago   > FOOT EXAM full sensation to monofilament, no ulcers noted   - Albumin Random Urine Quantitative with Creat Ratio; Future  - Adult Eye  Referral; Future  - HEMOGLOBIN A1C; Future  - refilled metformin (GLUCOPHAGE XR) 500 MG 24 hr tablet; Take 1 tablet (500 mg) by mouth daily (with dinner)  Dispense: 90 tablet; Refill: 3  - refilled empagliflozin (JARDIANCE) 10 MG TABS tablet; Take 1 tablet (10 mg) by mouth daily  Dispense: 90 tablet; Refill: 1    3. Erectile dysfunction, unspecified erectile dysfunction type  > it's possible this could be due to his type 2 diabetes, but will evaluate for other underlying causes   - TSH with free T4 reflex; Future  - Testosterone, total; Future  - Comprehensive metabolic panel (BMP + Alb, Alk Phos, ALT, AST, Total. Bili, TP)      Yuval Romo is a 58 year old, presenting for the following health issues:  Diabetes and Recheck Medication        8/14/2023     2:51 PM   Additional Questions   Roomed by Evonne SHEN LPN   Accompanied by self         8/14/2023     2:51 PM   Patient Reported Additional Medications   Patient reports taking the following new medications Multi vitamin Daily       History of Present Illness       Diabetes:   He presents for follow up of diabetes.  He is checking home blood glucose one time daily.   He checks blood glucose before meals.  Blood glucose is never over 200 and never under 70. He is aware of hypoglycemia symptoms including none.    He has no concerns regarding his diabetes at this time.  He is having numbness in feet.  The patient has not had a diabetic eye exam in the last 12 months.          He eats 2-3 servings of fruits and vegetables daily.He consumes 1 sweetened beverage(s) daily.He  "exercises with enough effort to increase his heart rate 9 or less minutes per day.  He exercises with enough effort to increase his heart rate 3 or less days per week.   He is taking medications regularly.     Medication Followup of Jardiance 10mg  Taking Medication as prescribed: yes  Side Effects:  None  Medication Helping Symptoms:  yes  Medication Followup of Metformin 500mg  Taking Medication as prescribed: yes  Side Effects:  None  Medication Helping Symptoms:  yes    Erectile Dysfunction:   Is still able to wake up with a morning erection (not as often as he did in the past)   No issues with ejaculation   No smoking history   Drinks 1-2 drinks per month   Denies any concerns for anxiety and depression     Review of Systems   As above       Objective    /82 (BP Location: Right arm, Patient Position: Sitting, Cuff Size: Adult Regular)   Pulse 70   Temp 97.8  F (36.6  C) (Tympanic)   Resp 18   Ht 1.875 m (6' 1.82\")   Wt 80.4 kg (177 lb 4.8 oz)   SpO2 100%   BMI 22.88 kg/m    Body mass index is 22.88 kg/m .  Physical Exam  Constitutional:       General: He is not in acute distress.     Appearance: Normal appearance.   HENT:      Head: Normocephalic.   Eyes:      General: No scleral icterus.        Right eye: No discharge.         Left eye: No discharge.      Extraocular Movements: Extraocular movements intact.      Conjunctiva/sclera: Conjunctivae normal.   Pulmonary:      Effort: Pulmonary effort is normal. No respiratory distress.   Musculoskeletal:         General: Normal range of motion.      Cervical back: Normal range of motion.   Skin:     General: Skin is warm.      Findings: No rash.      Comments: No rash on exposed skin   Neurological:      General: No focal deficit present.      Mental Status: He is alert and oriented to person, place, and time.      Comments: Sensory exam of the foot is normal, tested with the monofilament. No lesions or ulcers noted      Psychiatric:         Mood and " Affect: Mood normal.         Behavior: Behavior normal.

## 2023-08-16 ENCOUNTER — TELEPHONE (OUTPATIENT)
Dept: FAMILY MEDICINE | Facility: CLINIC | Age: 58
End: 2023-08-16
Payer: COMMERCIAL

## 2023-08-16 DIAGNOSIS — E11.9 TYPE 2 DIABETES MELLITUS WITHOUT COMPLICATION, WITHOUT LONG-TERM CURRENT USE OF INSULIN (H): ICD-10-CM

## 2023-08-16 DIAGNOSIS — R79.89 ELEVATED LFTS: Primary | ICD-10-CM

## 2023-08-16 DIAGNOSIS — D69.6 THROMBOCYTOPENIA (H): Primary | ICD-10-CM

## 2023-08-16 DIAGNOSIS — R79.89 ELEVATED TESTOSTERONE LEVEL: Primary | ICD-10-CM

## 2023-08-16 LAB
ERYTHROCYTE [DISTWIDTH] IN BLOOD BY AUTOMATED COUNT: 11.9 % (ref 10–15)
HCT VFR BLD AUTO: 44.7 % (ref 40–53)
HGB BLD-MCNC: 15.8 G/DL (ref 13.3–17.7)
MCH RBC QN AUTO: 35.7 PG (ref 26.5–33)
MCHC RBC AUTO-ENTMCNC: 35.3 G/DL (ref 31.5–36.5)
MCV RBC AUTO: 101 FL (ref 78–100)
PLATELET # BLD AUTO: 45 10E3/UL (ref 150–450)
RBC # BLD AUTO: 4.42 10E6/UL (ref 4.4–5.9)
TESTOST SERPL-MCNC: 1112 NG/DL (ref 240–950)
WBC # BLD AUTO: 3.4 10E3/UL (ref 4–11)

## 2023-08-16 NOTE — TELEPHONE ENCOUNTER
Dr. Salas,    Lab calls with critical lab value.  Platelets are 43.  The clinic lab is sending it to the hospital lab for confirmation.  Shahana MORA RN

## 2023-08-16 NOTE — RESULT ENCOUNTER NOTE
Isidro Burton,     It is a pleasure providing you with medical care. I have received and reviewed your lab results, and have the following recommendations:     Amazing news! Your A1c has dropped significantly since it was last checked. You still will have the diagnosis of diabetes, however, I think if you continue with the lifestyle changes you have made you can actually stop taking your Jardiance and we can recheck your A1c again in 3 months. You can go ahead and call the lab to schedule that appointment.     You did have elevated liver enzymes which do appear to be improving from labs collected many years ago. However, I still would request that you make an appointment with lab in the next 4-6 weeks to ensure that your levels are staying stable/not going up.       Sincerely,     Alfreda Salas MD

## 2023-08-16 NOTE — TELEPHONE ENCOUNTER
Please call patient    Platelets very low. This can be due to multiple reasons: liver disease, alcohol use, nutritional deficiencies, etc.  Please triage patient, if he is feeling well, no signs of bleeding, no abdominal pain, nausea, vomiting, fevers and if overall he is feeling well I recommend to schedule follow up appointment with Dr Salas tomorrow, or any available provider.    If he is symptomatic (blood in stools, bruising easily, bleeding gums, abdominal pain) he should come to ER for additional evaluation.    EDUARDO Car CNP

## 2023-08-16 NOTE — CONFIDENTIAL NOTE
Patient incidentally found to have thrombocytopenia after a CBC was ordered to evaluate for polycythemia in the setting of elevated testosterone.     Patient has been doing well, will place order for labs: CBC with diff, PTINR, PTT, and blood smear.     Alfreda Salas MD

## 2023-08-16 NOTE — RESULT ENCOUNTER NOTE
Isidro Burton,     It is a pleasure providing you with medical care. I have received and reviewed your lab results, and have the following recommendations:     Unfortunately, based on your CBC results, you have been found to have a very low platelet count. Platelets help our body make clots to protect us from bleeding. If your platelets are low then you are at risk for excessive bleeding. Sometimes, people have low platelets because of autoimmune disease.     I would request that you make an appointment with me ASAP to discuss these results. I will also place some lab orders for additional lab work. Quite a few labs will need to be drawn to figure out why exactly you have such low platelets. I plan on checking a CMP, CBC with differential, a peripheral blood smear, and a PTINR to assess your clotting times.     Sincerely,     Alfreda Salas MD

## 2023-08-16 NOTE — TELEPHONE ENCOUNTER
No reply from Dr Salas yet.    Routed to Nikkie King, colleague, for further instructions please.    Dary Jones RN

## 2023-08-16 NOTE — RESULT ENCOUNTER NOTE
Isidro Burton,     It is a pleasure providing you with medical care. I have received and reviewed your lab results, and have the following recommendations:     Based on your Testosterone labs, you do have a mildly elevated testosterone levels. If you are taking any testosterone based supplements I would recommend holding off on that. Otherwise, I think this is something we can monitor for now. I did request the lab add a CBC to your previously provided lab work just to make sure you don't have an elevated hematocrit level which can be associated with elevated testosterone.     When you drop off your lab work for a repeat CMP in 4-6 weeks we can recheck your testosterone levels at that time. I would recommend that you be fasting and provide that lab work early in the morning shortly after waking up as fasting and time of day can also affect testosterone labs.       Sincerely,     Alfreda Salas MD

## 2023-08-16 NOTE — TELEPHONE ENCOUNTER
"Routed to Dr Salas.  FYI.    Spoke with pt and shared provider's instructions.    Appt made for Monday, 8/21/23 to see Dr Salas.  [facility]      Pt will observe precautions to minimize bleeding and present to ED if develops symptoms as noted from provider.    Pt states he has \"been feeling pretty good actually\" and has been helping his son recently without any concerning symptoms.    Pt denies much alcohol use stating maybe has 1 to 2 drinks per month.    Pt denies symptoms that could reflect low platelets except that \"some of my bruises were taking a while to go away.\"    Pt asked that provider's instructions below be forwarded to him in LUVH[facility] message.  Copied and sent.    Dary Jones RN    "

## 2023-08-17 ENCOUNTER — TELEPHONE (OUTPATIENT)
Dept: FAMILY MEDICINE | Facility: CLINIC | Age: 58
End: 2023-08-17
Payer: COMMERCIAL

## 2023-08-17 NOTE — TELEPHONE ENCOUNTER
I called the patient and he is feeling good.  No signs of bleeding.  He made an appt to see Dr Salas on Monday.  He will go to ER if any signs of bleeding.  I transferred call to lab scheduling for labs that are ordered.

## 2023-08-21 ENCOUNTER — OFFICE VISIT (OUTPATIENT)
Dept: FAMILY MEDICINE | Facility: CLINIC | Age: 58
End: 2023-08-21
Payer: COMMERCIAL

## 2023-08-21 ENCOUNTER — LAB (OUTPATIENT)
Dept: LAB | Facility: CLINIC | Age: 58
End: 2023-08-21
Payer: COMMERCIAL

## 2023-08-21 VITALS
DIASTOLIC BLOOD PRESSURE: 82 MMHG | OXYGEN SATURATION: 99 % | RESPIRATION RATE: 20 BRPM | BODY MASS INDEX: 23.99 KG/M2 | WEIGHT: 181 LBS | HEIGHT: 73 IN | TEMPERATURE: 97 F | HEART RATE: 66 BPM | SYSTOLIC BLOOD PRESSURE: 130 MMHG

## 2023-08-21 DIAGNOSIS — R71.8 ELEVATED MCV: ICD-10-CM

## 2023-08-21 DIAGNOSIS — R79.89 ELEVATED TESTOSTERONE LEVEL: ICD-10-CM

## 2023-08-21 DIAGNOSIS — D69.6 THROMBOCYTOPENIA (H): ICD-10-CM

## 2023-08-21 DIAGNOSIS — R79.89 ELEVATED LFTS: ICD-10-CM

## 2023-08-21 DIAGNOSIS — Z23 ENCOUNTER FOR IMMUNIZATION: ICD-10-CM

## 2023-08-21 DIAGNOSIS — D69.6 THROMBOCYTOPENIA (H): Primary | ICD-10-CM

## 2023-08-21 LAB
ALBUMIN SERPL BCG-MCNC: 4.1 G/DL (ref 3.5–5.2)
ALP SERPL-CCNC: 163 U/L (ref 40–129)
ALT SERPL W P-5'-P-CCNC: 85 U/L (ref 0–70)
ANION GAP SERPL CALCULATED.3IONS-SCNC: 11 MMOL/L (ref 7–15)
APTT PPP: 36 SECONDS (ref 22–38)
AST SERPL W P-5'-P-CCNC: 72 U/L (ref 0–45)
BASOPHILS # BLD AUTO: 0 10E3/UL (ref 0–0.2)
BASOPHILS NFR BLD AUTO: 0 %
BILIRUB SERPL-MCNC: 1.6 MG/DL
BUN SERPL-MCNC: 16.7 MG/DL (ref 6–20)
CALCIUM SERPL-MCNC: 9.2 MG/DL (ref 8.6–10)
CHLORIDE SERPL-SCNC: 107 MMOL/L (ref 98–107)
CREAT SERPL-MCNC: 0.74 MG/DL (ref 0.67–1.17)
DEPRECATED HCO3 PLAS-SCNC: 23 MMOL/L (ref 22–29)
EOSINOPHIL # BLD AUTO: 0.1 10E3/UL (ref 0–0.7)
EOSINOPHIL NFR BLD AUTO: 2 %
ERYTHROCYTE [DISTWIDTH] IN BLOOD BY AUTOMATED COUNT: 12.3 % (ref 10–15)
GFR SERPL CREATININE-BSD FRML MDRD: >90 ML/MIN/1.73M2
GLUCOSE SERPL-MCNC: 83 MG/DL (ref 70–99)
HCT VFR BLD AUTO: 43.1 % (ref 40–53)
HGB BLD-MCNC: 15.3 G/DL (ref 13.3–17.7)
IMM GRANULOCYTES # BLD: 0 10E3/UL
IMM GRANULOCYTES NFR BLD: 0 %
INR PPP: 1.11 (ref 0.85–1.15)
LYMPHOCYTES # BLD AUTO: 1.1 10E3/UL (ref 0.8–5.3)
LYMPHOCYTES NFR BLD AUTO: 31 %
MCH RBC QN AUTO: 35.9 PG (ref 26.5–33)
MCHC RBC AUTO-ENTMCNC: 35.5 G/DL (ref 31.5–36.5)
MCV RBC AUTO: 101 FL (ref 78–100)
MONOCYTES # BLD AUTO: 0.3 10E3/UL (ref 0–1.3)
MONOCYTES NFR BLD AUTO: 9 %
NEUTROPHILS # BLD AUTO: 1.9 10E3/UL (ref 1.6–8.3)
NEUTROPHILS NFR BLD AUTO: 58 %
NRBC # BLD AUTO: 0 10E3/UL
NRBC BLD AUTO-RTO: 0 /100
PLATELET # BLD AUTO: 58 10E3/UL (ref 150–450)
POTASSIUM SERPL-SCNC: 4 MMOL/L (ref 3.4–5.3)
PROT SERPL-MCNC: 7.1 G/DL (ref 6.4–8.3)
RBC # BLD AUTO: 4.26 10E6/UL (ref 4.4–5.9)
RETICS # AUTO: 0.07 10E6/UL (ref 0.03–0.1)
RETICS/RBC NFR AUTO: 1.7 % (ref 0.5–2)
SODIUM SERPL-SCNC: 141 MMOL/L (ref 136–145)
VIT B12 SERPL-MCNC: 621 PG/ML (ref 232–1245)
WBC # BLD AUTO: 3.3 10E3/UL (ref 4–11)

## 2023-08-21 PROCEDURE — 85610 PROTHROMBIN TIME: CPT

## 2023-08-21 PROCEDURE — 84403 ASSAY OF TOTAL TESTOSTERONE: CPT

## 2023-08-21 PROCEDURE — 85730 THROMBOPLASTIN TIME PARTIAL: CPT

## 2023-08-21 PROCEDURE — 90677 PCV20 VACCINE IM: CPT | Performed by: STUDENT IN AN ORGANIZED HEALTH CARE EDUCATION/TRAINING PROGRAM

## 2023-08-21 PROCEDURE — 80053 COMPREHEN METABOLIC PANEL: CPT

## 2023-08-21 PROCEDURE — 99213 OFFICE O/P EST LOW 20 MIN: CPT | Mod: 25 | Performed by: STUDENT IN AN ORGANIZED HEALTH CARE EDUCATION/TRAINING PROGRAM

## 2023-08-21 PROCEDURE — 90471 IMMUNIZATION ADMIN: CPT | Performed by: STUDENT IN AN ORGANIZED HEALTH CARE EDUCATION/TRAINING PROGRAM

## 2023-08-21 PROCEDURE — 85025 COMPLETE CBC W/AUTO DIFF WBC: CPT

## 2023-08-21 PROCEDURE — 85045 AUTOMATED RETICULOCYTE COUNT: CPT

## 2023-08-21 PROCEDURE — 82607 VITAMIN B-12: CPT

## 2023-08-21 PROCEDURE — 36415 COLL VENOUS BLD VENIPUNCTURE: CPT

## 2023-08-21 PROCEDURE — 99207 BLOOD MORPHOLOGY PATHOLOGIST REVIEW: CPT | Performed by: PATHOLOGY

## 2023-08-21 ASSESSMENT — PAIN SCALES - GENERAL: PAINLEVEL: NO PAIN (0)

## 2023-08-21 NOTE — PROGRESS NOTES
1. Thrombocytopenia (H)   2. Elevated MCV  > Patient incidentally found to have thrombocytopenia, based on drug reviews is possible that his thrombocytopenia may be a side effect of atorvastatin, at this time patient is aware that he can hold off on taking his atorvastatin, per up-to-date platelet level should stabilize within 5 to 7 days of medication discontinuation  -If patient's platelets do not fall into normal limits after discontinuation of atorvastatin it is possible that he may have ITP, lower suspicion for malignancy, infection, or aplastic anemia  - Vitamin B12; Future given elevated MCV and reported lower extremity numbness/tingling    3. Encounter for immunization  > administered COVID and pneumococcal 20 at today's visit       Yuval Romo is a 58 year old, presenting for the following health issues:  Results (Here to follow up on low platelets ), Health Maintenance (Prevnar 20 - will do today , declined covid - will defer, will get 2nd shingrix at his pharmacy ), Imm/Inj (Prevnar 20), and Diabetes        8/21/2023     2:32 PM   Additional Questions   Roomed by Robreto CHERRY CMA   Accompanied by self       History of Present Illness       Diabetes:   He presents for follow up of diabetes.  He is checking home blood glucose one time daily.   He checks blood glucose before meals.  Blood glucose is never over 200 and never under 70. He is aware of hypoglycemia symptoms including none.    He has no concerns regarding his diabetes at this time.  He is having numbness in feet.  The patient has not had a diabetic eye exam in the last 12 months.          He eats 2-3 servings of fruits and vegetables daily.He consumes 1 sweetened beverage(s) daily.He exercises with enough effort to increase his heart rate 9 or less minutes per day.  He exercises with enough effort to increase his heart rate 3 or less days per week.   He is taking medications regularly.     Denies any episodes of easy bruising   Was interested  "in C peptide labs     Review of Systems   As above       Objective    /82 (BP Location: Left arm, Patient Position: Sitting, Cuff Size: Adult Regular)   Pulse 66   Temp 97  F (36.1  C) (Tympanic)   Resp 20   Ht 1.854 m (6' 1\")   Wt 82.1 kg (181 lb)   SpO2 99%   BMI 23.88 kg/m    Body mass index is 23.88 kg/m .  Physical Exam  Constitutional:       General: He is not in acute distress.     Appearance: Normal appearance.   HENT:      Head: Normocephalic.   Eyes:      General: No scleral icterus.        Right eye: No discharge.         Left eye: No discharge.      Extraocular Movements: Extraocular movements intact.      Conjunctiva/sclera: Conjunctivae normal.   Pulmonary:      Effort: Pulmonary effort is normal. No respiratory distress.   Musculoskeletal:         General: Normal range of motion.      Cervical back: Normal range of motion.   Skin:     General: Skin is warm.      Findings: No bruising or rash.      Comments: No rash on exposed skin   Neurological:      General: No focal deficit present.      Mental Status: He is alert and oriented to person, place, and time.   Psychiatric:         Mood and Affect: Mood normal.         Behavior: Behavior normal.                      "

## 2023-08-22 LAB
PATH REPORT.COMMENTS IMP SPEC: NORMAL
PATH REPORT.FINAL DX SPEC: NORMAL
PATH REPORT.MICROSCOPIC SPEC OTHER STN: NORMAL
PATH REPORT.MICROSCOPIC SPEC OTHER STN: NORMAL

## 2023-08-23 DIAGNOSIS — D69.6 THROMBOCYTOPENIA (H): Primary | ICD-10-CM

## 2023-08-23 LAB — TESTOST SERPL-MCNC: 824 NG/DL (ref 240–950)

## 2023-08-23 NOTE — RESULT ENCOUNTER NOTE
Isidro Burton,     It is a pleasure providing you with medical care. I have received and reviewed your lab results, and have the following recommendations:     Based on your labs, it appears that your platelets are increasing. Since I had asked you to stop the lipitor at our last visit, I will kindly request repeat labs in 2 weeks to reassess your platelets. If your platelets at that visit are within normal limits I suspect you have decreased platelets (thrombocytopenia) from the lipitor. If platelets continue to remain in decreased ranges, it's possible you may have ITP. Please call the lab to schedule an appointment in 2 weeks to assess your platelet levels.      If needed, would you be ok if I sent an electronic consult to hematology (the blood doctors) to see if they have any recommendations or additional information regarding your abnormal lab work? There may be a charge to your insurance for that consult. Like I said, hopefully we don't need to worry about that, but just in case we do, I would like to have your permission for it.     Good news your testosterone levels are within normal limits and the pathology report of your blood smear did not show any overt concerning findings other than the low platelets and mildly decreased low WBC.     Sincerely,     Alfreda Salas MD

## 2023-09-06 ENCOUNTER — LAB (OUTPATIENT)
Dept: LAB | Facility: CLINIC | Age: 58
End: 2023-09-06
Payer: COMMERCIAL

## 2023-09-06 DIAGNOSIS — D69.6 THROMBOCYTOPENIA (H): ICD-10-CM

## 2023-09-06 LAB
ALBUMIN SERPL BCG-MCNC: 3.9 G/DL (ref 3.5–5.2)
ALP SERPL-CCNC: 177 U/L (ref 40–129)
ALT SERPL W P-5'-P-CCNC: 86 U/L (ref 0–70)
ANION GAP SERPL CALCULATED.3IONS-SCNC: 8 MMOL/L (ref 7–15)
AST SERPL W P-5'-P-CCNC: 66 U/L (ref 0–45)
BASOPHILS # BLD AUTO: 0 10E3/UL (ref 0–0.2)
BASOPHILS NFR BLD AUTO: 1 %
BILIRUB SERPL-MCNC: 1.7 MG/DL
BUN SERPL-MCNC: 15 MG/DL (ref 6–20)
CALCIUM SERPL-MCNC: 9.7 MG/DL (ref 8.6–10)
CHLORIDE SERPL-SCNC: 105 MMOL/L (ref 98–107)
CREAT SERPL-MCNC: 0.76 MG/DL (ref 0.67–1.17)
DEPRECATED HCO3 PLAS-SCNC: 26 MMOL/L (ref 22–29)
EGFRCR SERPLBLD CKD-EPI 2021: >90 ML/MIN/1.73M2
EOSINOPHIL # BLD AUTO: 0.1 10E3/UL (ref 0–0.7)
EOSINOPHIL NFR BLD AUTO: 2 %
ERYTHROCYTE [DISTWIDTH] IN BLOOD BY AUTOMATED COUNT: 12.4 % (ref 10–15)
GLUCOSE SERPL-MCNC: 213 MG/DL (ref 70–99)
HCT VFR BLD AUTO: 42.9 % (ref 40–53)
HGB BLD-MCNC: 15 G/DL (ref 13.3–17.7)
IMM GRANULOCYTES # BLD: 0 10E3/UL
IMM GRANULOCYTES NFR BLD: 0 %
LYMPHOCYTES # BLD AUTO: 0.7 10E3/UL (ref 0.8–5.3)
LYMPHOCYTES NFR BLD AUTO: 30 %
MCH RBC QN AUTO: 35.6 PG (ref 26.5–33)
MCHC RBC AUTO-ENTMCNC: 35 G/DL (ref 31.5–36.5)
MCV RBC AUTO: 102 FL (ref 78–100)
MONOCYTES # BLD AUTO: 0.2 10E3/UL (ref 0–1.3)
MONOCYTES NFR BLD AUTO: 8 %
NEUTROPHILS # BLD AUTO: 1.4 10E3/UL (ref 1.6–8.3)
NEUTROPHILS NFR BLD AUTO: 59 %
NRBC # BLD AUTO: 0 10E3/UL
NRBC BLD AUTO-RTO: 0 /100
PLATELET # BLD AUTO: 60 10E3/UL (ref 150–450)
POTASSIUM SERPL-SCNC: 4.2 MMOL/L (ref 3.4–5.3)
PROT SERPL-MCNC: 7.1 G/DL (ref 6.4–8.3)
RBC # BLD AUTO: 4.21 10E6/UL (ref 4.4–5.9)
SODIUM SERPL-SCNC: 139 MMOL/L (ref 136–145)
WBC # BLD AUTO: 2.4 10E3/UL (ref 4–11)

## 2023-09-06 PROCEDURE — 36415 COLL VENOUS BLD VENIPUNCTURE: CPT

## 2023-09-06 PROCEDURE — 80053 COMPREHEN METABOLIC PANEL: CPT

## 2023-09-06 PROCEDURE — 85025 COMPLETE CBC W/AUTO DIFF WBC: CPT

## 2023-09-07 DIAGNOSIS — D69.6 THROMBOCYTOPENIA (H): Primary | ICD-10-CM

## 2023-09-07 DIAGNOSIS — R79.89 ELEVATED LFTS: ICD-10-CM

## 2023-09-07 NOTE — RESULT ENCOUNTER NOTE
Isidro Burton,     It is a pleasure providing you with medical care. I have received and reviewed your lab results, and have the following recommendations:     Thank you for completing your lab work.  Unfortunately, it does appear that your platelet count continues to remain decreased even though you have stopped taking the Lipitor. Because of this would you be okay if I placed electronic consult to the hematologist for further recommendations?  There may be a charge to your insurance for this consult.     Finally, sometimes liver disease can result in abnormal platelets.  It does appear that you have an elevated alkaline phosphatase, AST, and ALT these are liver enzymes and if elevated can be a sign of liver strain.  Because of this I will be placing an order for a liver ultrasound just to ensure there are no anatomical abnormalities with your liver. Please call [phone] to schedule an appointment for your liver ultrasound.     Sincerely,     Alfreda Salas MD

## 2023-09-12 ENCOUNTER — HOSPITAL ENCOUNTER (OUTPATIENT)
Dept: ULTRASOUND IMAGING | Facility: CLINIC | Age: 58
Discharge: HOME | End: 2023-09-12
Attending: STUDENT IN AN ORGANIZED HEALTH CARE EDUCATION/TRAINING PROGRAM | Admitting: STUDENT IN AN ORGANIZED HEALTH CARE EDUCATION/TRAINING PROGRAM
Payer: COMMERCIAL

## 2023-09-12 DIAGNOSIS — D69.6 THROMBOCYTOPENIA (H): ICD-10-CM

## 2023-09-12 DIAGNOSIS — R79.89 ELEVATED LFTS: ICD-10-CM

## 2023-09-12 DIAGNOSIS — D69.6 THROMBOCYTOPENIA (H): Primary | ICD-10-CM

## 2023-09-12 PROCEDURE — 99207 E-CONSULT TO HEMATOLOGY (ADULT OUTPT PROVIDER TO SPECIALIST WRITTEN QUESTION & RESPONSE): CPT | Performed by: STUDENT IN AN ORGANIZED HEALTH CARE EDUCATION/TRAINING PROGRAM

## 2023-09-12 PROCEDURE — 76705 ECHO EXAM OF ABDOMEN: CPT

## 2023-09-13 ENCOUNTER — E-CONSULT (OUTPATIENT)
Dept: ONCOLOGY | Facility: CLINIC | Age: 58
End: 2023-09-13
Payer: COMMERCIAL

## 2023-09-13 ENCOUNTER — TELEPHONE (OUTPATIENT)
Dept: FAMILY MEDICINE | Facility: CLINIC | Age: 58
End: 2023-09-13

## 2023-09-13 PROCEDURE — 99451 NTRPROF PH1/NTRNET/EHR 5/>: CPT | Performed by: INTERNAL MEDICINE

## 2023-09-13 NOTE — TELEPHONE ENCOUNTER
Patient calling states he is returning a phone call    I don't see anything in the chart indicating a call    Reviewed lab and ultrasound results which patient was already aware of    Dr. Salas, was there anything else you or your team were calling patient about? E-consult?    Please call patient: [phone] okay to leave a detailed message    Sharri Rodgers RN on 9/13/2023 at 11:47 AM

## 2023-09-13 NOTE — PROGRESS NOTES
9/13/2023     E-Consult has been accepted.    Interprofessional consultation requested by:  Alfreda Salas MD      Clinical Question/Purpose: MY CLINICAL QUESTION IS: patient with thrombocytopenia etiology unclear, initially thought it was secondary to lipitor, however, after discontinuation and recheck he still had low platelets. Does patient have ITP and can this just be monitored with annual labs?     Patient assessment and information reviewed:   Baseline CBC - WBC 5.1, hgb 15.8, MCV 96, Plts 154 in 2006. No CBC since then. LFTs at that time showed elevated AST and ALT 75 and 153, without elevated bilirubin.    First CBC in system since 2006, done 8/14/23, with MCV up to 101, hgb stable, WBC 3.4, plt 45, no diff. AST, ALT 70/84. Tbili 1.5. ANC 1.9. Smear is bland.  INR 1.11. PTT normal. B12 621. Repeat CBC with WBC 2.4, , plt 60. ANC 1.4. US with borderline splenic enlargement. Previous US 2016 with normal spleen but fatty infiltration of the liver. Pt is not obese, reports 1-2 alcoholic drinks.    Recommendations:   With decreased WBC, decreased plts, and elevated MCV, along with chronically increased AST, ALT, and now increased T bili would be concerned about an occult liver dysfunction. Consider liver elastography to look for cirrhosis and consider workup for causes of liver failure. Agree with discontinuing potentially liver toxic medication. Check folic acid and copper to rule out those deficiencies. Check Hep B, HCV, HIV to rule out some chronic infectious cause of thrombocytopenia (and LFT elevation). Check D bili to see if this hyperbilirubinemia has a liver component.    The recommendations provided in this E-Consult are based on a review of clinical data pertinent to the clinical question presented, without a review of the patient's complete medical record or, the benefit of a comprehensive in-person or virtual patient evaluation. This consultation should not replace the clinical judgement and  evaluation of the provider ordering this E-Consult. Any new clinical issues, or changes in patient status since the filing of this E-Consult will need to be taken into account when assessing these recommendations. Please contact me if you have further questions.    My total time spent reviewing clinical information and formulating assessment was 15 minutes.    Wilmer Prescott MD

## 2023-09-13 NOTE — RESULT ENCOUNTER NOTE
Isidro Burton,     It is a pleasure providing you with medical care. I have received and reviewed your ultrasound results, and have the following recommendations:     Your ultrasound did show you had a mild enlargement in your spleen, this does make me think that perhaps you do have a condition called ITP (when you immune system attacks your platelets). But I will also wait to hear back from heme regarding the electronic consult you gave me permission to complete. If they agree that you have ITP then we can discuss treatment options.     Sincerely,     Alfreda Salas MD

## 2023-09-15 NOTE — TELEPHONE ENCOUNTER
From Josue Cantor Anita, MD  [facility] Pool20 hours ago (2:13 PM)     AB  Hello,    I did not call this pt. Did someone perhaps try calling him with his US results and he was returning a missed call?    Alfreda Salas MD     Went over providers result msg from 9/12. Looks like provider is waiting to hear back from hematology re: E-consult that she placed.  Pt will wait to hear from provider.    Tali Uriarte RN

## 2023-09-21 ENCOUNTER — TELEPHONE (OUTPATIENT)
Dept: FAMILY MEDICINE | Facility: CLINIC | Age: 58
End: 2023-09-21
Payer: COMMERCIAL

## 2023-09-21 DIAGNOSIS — D69.6 THROMBOCYTOPENIA (H): Primary | ICD-10-CM

## 2023-09-21 DIAGNOSIS — M25.50 MULTIPLE JOINT PAIN: Primary | ICD-10-CM

## 2023-09-21 DIAGNOSIS — R79.89 ELEVATED LFTS: ICD-10-CM

## 2023-09-21 DIAGNOSIS — R21 TARGET RASH: ICD-10-CM

## 2023-09-21 NOTE — TELEPHONE ENCOUNTER
Dr Salas,    Please see telephone note re: bulls eye rash in July & pt wanting lyme test added to recent labs ordered.    Tali Uriarte RN

## 2023-09-21 NOTE — TELEPHONE ENCOUNTER
----- Message from Alfreda Salas MD sent at 9/21/2023  1:48 PM CDT -----  Isidro Romo,    Thank you for your patience, I did hear back from the hematologist with recommendations regarding your blood work.  At this time I just want to rule out an occult liver disease so they recommended getting some blood work as well as a liver elastography (an imaging test that checks your organs to see if there is different than normal as this could potentially be a sign of fibrosis)     Please call [phone] to schedule an appointment for your liver elastography appointment.  Please call the lab to schedule an appointment for blood draw.  The blood work I'm planning on looking at includes copper level, B9 level, hepatitis B, and direct bilirubin since your total bilirubin was mildly elevated.    Sincerely,     Alfreda Salas MD

## 2023-09-21 NOTE — TELEPHONE ENCOUNTER
Pt was called & read providers message. Pt verbalized understanding.  I will send pt providers message through eTobb. Pt will get numbers off eTobb & call for appts for lab & elastography.    *pt also states few mos ago (mid July) he had a bulls-eye rash on inside of upper right arm between elbow & armpit. States rash was baseball size, there for a few days & then resolved. Pt never found a tick. Had the center red area with outer red ring. Pt states did have achy joints, toes numb & ankles/knees sore & has talked with provider about these sx.    Pt asking to have lyme test done.    Routing to provider for review.    Tali Uriarte RN

## 2023-09-22 NOTE — TELEPHONE ENCOUNTER
Called pt to update with providers msg. Pt verbalized understanding. Will call for lab appt.    Tali Uriarte RN

## 2023-09-22 NOTE — TELEPHONE ENCOUNTER
Note from Josue Cantor Anita, MD  [facility] Pool16 hours ago (3:47 PM)     AB  Thank you so much for let me know, I have placed an order for Lyme titers to be obtained as well in addition to the recommended blood work by the specialist.  Patient can call the lab and provide blood work when able.    Sincerely,    Alfreda Salas MD

## 2023-09-26 ENCOUNTER — ANCILLARY PROCEDURE (OUTPATIENT)
Dept: ULTRASOUND IMAGING | Facility: CLINIC | Age: 58
End: 2023-09-26
Attending: STUDENT IN AN ORGANIZED HEALTH CARE EDUCATION/TRAINING PROGRAM
Payer: COMMERCIAL

## 2023-09-26 DIAGNOSIS — D69.6 THROMBOCYTOPENIA (H): ICD-10-CM

## 2023-09-26 DIAGNOSIS — R79.89 ELEVATED LFTS: ICD-10-CM

## 2023-09-26 PROCEDURE — 76981 USE PARENCHYMA: CPT | Mod: GC | Performed by: RADIOLOGY

## 2023-09-28 DIAGNOSIS — D69.6 THROMBOCYTOPENIA (H): Primary | ICD-10-CM

## 2023-09-28 DIAGNOSIS — K76.6 PORTAL HYPERTENSION (H): ICD-10-CM

## 2023-09-28 NOTE — RESULT ENCOUNTER NOTE
Isidro Burton,     It was a pleasure speaking with you. I have received and reviewed your results, and have the following recommendations:     Based on your liver elastography ultrasound you are found to have chronic liver disease and portal hypertension. Because of this and your recent labs showing low platelet counts, I will be sending a referral to the hepatologist for additional recommendations.      Sincerely,     Alfreda Salas MD

## 2023-10-10 ENCOUNTER — MYC MEDICAL ADVICE (OUTPATIENT)
Dept: FAMILY MEDICINE | Facility: CLINIC | Age: 58
End: 2023-10-10
Payer: COMMERCIAL

## 2023-10-19 NOTE — CONFIDENTIAL NOTE
DIAGNOSIS:  Portal hypertension (H)    Appt Date: 12.27.2023    NOTES STATUS DETAILS   OFFICE NOTE from referring provider Internal 09.28.2023 Alfreda Salas MD    OFFICE NOTES from other specialists     DISCHARGE SUMMARY from hospital     MEDICATION LIST Internal    LIVER BIOSPY (IF APPLICABLE)      PATHOLOGY REPORTS      IMAGING     ENDOSCOPY (IF AVAILABLE)     COLONOSCOPY (IF AVAILABLE) Internal 05.21.2021   ULTRASOUND LIVER Internal 09.26.2023 US Elastography    09.12.2023 ULTRASOUND ABDOMEN LIMITED    CT OF ABDOMEN     MRI OF LIVER     FIBROSCAN, US ELASTOGRAPHY, FIBROSIS SCAN, MR ELASTOGRAPHY     LABS     HEPATIC PANEL (LIVER PANEL)     BASIC METABOLIC PANEL     COMPLETE METABOLIC PANEL Internal 09.06.2023   COMPLETE BLOOD COUNT (CBC) Internal 09.06.2023    INTERNATIONAL NORMALIZED RATIO (INR) Internal 08.21.2023   HEPATITIS C ANTIBODY Internal 05.05.2023   HEPATITIS C VIRAL LOAD/PCR     HEPATITIS C GENOTYPE     HEPATITIS B SURFACE ANTIGEN     HEPATITIS B SURFACE ANTIBODY     HEPATITIS B DNA QUANT LEVEL     HEPATITIS B CORE ANTIBODY

## 2023-11-19 ENCOUNTER — HEALTH MAINTENANCE LETTER (OUTPATIENT)
Age: 58
End: 2023-11-19

## 2023-12-04 ENCOUNTER — LAB (OUTPATIENT)
Dept: LAB | Facility: CLINIC | Age: 58
End: 2023-12-04
Payer: COMMERCIAL

## 2023-12-04 DIAGNOSIS — R21 TARGET RASH: ICD-10-CM

## 2023-12-04 DIAGNOSIS — R79.89 ELEVATED LFTS: ICD-10-CM

## 2023-12-04 DIAGNOSIS — E11.9 TYPE 2 DIABETES MELLITUS WITHOUT COMPLICATION, WITHOUT LONG-TERM CURRENT USE OF INSULIN (H): ICD-10-CM

## 2023-12-04 DIAGNOSIS — D69.6 THROMBOCYTOPENIA (H): ICD-10-CM

## 2023-12-04 DIAGNOSIS — M25.50 MULTIPLE JOINT PAIN: ICD-10-CM

## 2023-12-04 LAB
BILIRUB DIRECT SERPL-MCNC: 0.33 MG/DL (ref 0–0.3)
FOLATE SERPL-MCNC: 12.3 NG/ML (ref 4.6–34.8)
HBA1C MFR BLD: 5.9 % (ref 0–5.6)

## 2023-12-04 PROCEDURE — 86704 HEP B CORE ANTIBODY TOTAL: CPT

## 2023-12-04 PROCEDURE — 86618 LYME DISEASE ANTIBODY: CPT

## 2023-12-04 PROCEDURE — 83036 HEMOGLOBIN GLYCOSYLATED A1C: CPT

## 2023-12-04 PROCEDURE — 87340 HEPATITIS B SURFACE AG IA: CPT

## 2023-12-04 PROCEDURE — 82525 ASSAY OF COPPER: CPT | Mod: 90

## 2023-12-04 PROCEDURE — 99000 SPECIMEN HANDLING OFFICE-LAB: CPT

## 2023-12-04 PROCEDURE — 82248 BILIRUBIN DIRECT: CPT

## 2023-12-04 PROCEDURE — 82746 ASSAY OF FOLIC ACID SERUM: CPT

## 2023-12-04 PROCEDURE — 36415 COLL VENOUS BLD VENIPUNCTURE: CPT

## 2023-12-04 PROCEDURE — 86706 HEP B SURFACE ANTIBODY: CPT

## 2023-12-04 NOTE — LETTER
December 7, 2023      Demetrius Burton  [address] [facility] [address]        Dear ,    We are writing to inform you of your test results.    It is a pleasure providing you with medical care. I have received and reviewed your lab results, and have the following recommendations:     Good news is your copper levels are also within normal limits. Your mildly elevated bilirubin can potentially be explained by your abnormal liver ultrasound results. I see you have an upcoming appointment with the GI/hepatologist specialist. Please make sure you go to that visit for additional recommendations.      Resulted Orders   Hemoglobin A1c   Result Value Ref Range    Hemoglobin A1C 5.9 (H) 0.0 - 5.6 %      Comment:      Normal <5.7%   Prediabetes 5.7-6.4%    Diabetes 6.5% or higher     Note: Adopted from ADA consensus guidelines.   Hepatitis B surface antigen   Result Value Ref Range    Hepatitis B Surface Antigen Nonreactive Nonreactive   Hepatitis B Surface Antibody   Result Value Ref Range    Hepatitis B Surface Antibody Instrument Value 4.97 <8.00 m[IU]/mL    Hepatitis B Surface Antibody Nonreactive       Comment:      No antibody detected when the value is less than 8.00 mIU/mL.   Hepatitis B core antibody   Result Value Ref Range    Hepatitis B Core Antibody Total Nonreactive Nonreactive   Folate   Result Value Ref Range    Folic Acid 12.3 4.6 - 34.8 ng/mL   Copper level   Result Value Ref Range    Copper 76.4 70.0 - 140.0 ug/dL      Comment:      INTERPRETIVE INFORMATION: Copper, Serum or Plasma    Elevated results may be due to skin or collection-related   contamination, including the use of a noncertified   metal-free collection/transport tube. If contamination   concerns exist due to elevated levels of serum/plasma   copper, confirmation with a second specimen collected in a   certified metal-free tube is recommended.    Serum copper may be elevated with infection, inflammation,   stress, and copper  supplementation. In females, elevated   copper may also be caused by oral contraceptives and   pregnancy (concentrations may be elevated up to 3 times   normal during the third trimester).    This test was developed and its performance characteristics   determined by [facility]. It has not been cleared or   approved by the [place] Food and Drug Administration. This test   was performed in a CLIA certified laboratory and is   intended for clinical purposes.  Performed By: [facility]  [address] [place] [address]  [id number]  : Rajesh Mckinney MD, PhD  CLIA Number: [id number]   Bilirubin direct   Result Value Ref Range    Bilirubin Direct 0.33 (H) 0.00 - 0.30 mg/dL   Lyme Disease Total Abs Bld with Reflex to Confirm CLIA   Result Value Ref Range    Lyme Disease Antibodies Total 0.35 <0.90      Comment:      Non-reactive, Absence of detectable Borrelia burgdorferi antibodies. A non-reactive result does not exclude the possibility of Borrelia burgdorferi infection. If early Lyme disease is suspected, a second sample should be collected and tested 2 to 4 weeks later.       If you have any questions or concerns, please call the clinic at the number listed above.       Sincerely,      Alfreda Salas MD

## 2023-12-04 NOTE — LETTER
December 6, 2023      Demetrius FARIHA Micheal  [address] [facility] [address]        Dear ,    We are writing to inform you of your test results.  Covering for Dr. Salas:  Your hemoglobin A1c is very good at 5.9%.  Evonne Chambers CNP    Resulted Orders   Hemoglobin A1c   Result Value Ref Range    Hemoglobin A1C 5.9 (H) 0.0 - 5.6 %      Comment:      Normal <5.7%   Prediabetes 5.7-6.4%    Diabetes 6.5% or higher     Note: Adopted from ADA consensus guidelines.       If you have any questions or concerns, please call the clinic at the number listed above.       Sincerely,      Alfreda Salas MD

## 2023-12-05 LAB
HBV CORE AB SERPL QL IA: NONREACTIVE
HBV SURFACE AB SERPL IA-ACNC: 4.97 M[IU]/ML
HBV SURFACE AB SERPL IA-ACNC: NONREACTIVE M[IU]/ML
HBV SURFACE AG SERPL QL IA: NONREACTIVE

## 2023-12-06 LAB — B BURGDOR IGG+IGM SER QL: 0.35

## 2023-12-07 LAB — COPPER SERPL-MCNC: 76.4 UG/DL

## 2023-12-07 NOTE — RESULT ENCOUNTER NOTE
Isidro Burton,     It is a pleasure providing you with medical care. I have received and reviewed your lab results, and have the following recommendations:     Good news is your copper levels are also within normal limits. Your mildly elevated bilirubin can potentially be explained by your abnormal liver ultrasound results. I see you have an upcoming appointment with the GI/hepatologist specialist. Please make sure you go to that visit for additional recommendations.        Sincerely,     Alfreda Salas MD

## 2023-12-19 ENCOUNTER — TELEPHONE (OUTPATIENT)
Dept: GASTROENTEROLOGY | Facility: CLINIC | Age: 58
End: 2023-12-19
Payer: COMMERCIAL

## 2023-12-19 DIAGNOSIS — K76.6 PORTAL HYPERTENSION (H): Primary | ICD-10-CM

## 2023-12-20 DIAGNOSIS — K74.60 CIRRHOSIS OF LIVER WITHOUT ASCITES, UNSPECIFIED HEPATIC CIRRHOSIS TYPE (H): Primary | ICD-10-CM

## 2023-12-20 NOTE — PROGRESS NOTES
[facility] Hepatology    New Patient Visit    Referring provider:  Alfreda Salas  Chief complaint:  Concern for liver disease    Assessment  58 year old male with past medical history of type 2 diabetes who presents with concerns for advanced liver disease    #.  Concern for advanced fibrosis versus cirrhosis  #.  Abnormal liver studies  #.  Thrombocytopenia  Patient with a history of metabolic syndrome -type 2 diabetes and hyperlipidemia.  He was overweight in the past but was never had a BMI greater than 30.  Denies any history of alcohol overuse.  Denies any family history of liver disease. The patient has had abnormal liver studies since 2006 with ALT > AST- this was never really investigated given the patient never really saw a physician -concerned that he may have had fatty liver disease at this time that progressed to fibrosis.  Given his platelet count of less than 150 this is concerning for portal hypertension along with mild splenomegaly on imaging as well as ultrasound elastography with concern for fibrosis. His INR is mildly elevated which is concerning for some degree of synthetic dysfunction.    The differential for his liver disease remains broad and includes fatty liver disease, hemochromatosis given his elevated iron saturation to 55% + ferritin to 1k (despite no alcohol), and less likely autoimmune related liver injury. HBV and HCV studies negative. Abdominal ultrasound with no biliary ductal dilation.  His alkaline phosphatase on last check was within normal limits.  He has a predominantly unconjugated hyperbilirubinemia.     Plan  -- Follow-up PETH, CRISTINA, F-actin and alpha-1 antitrypsin  -- Follow up HFE; ordered   -- Plan for IR guided liver biopsy for assessment of potential etiology of liver disease as well as staging of his fibrosis  -- No contraindication to statins from a liver perspective    Health Maintenance:  -- COVID Vaccination s/p 3 doses  -- Recommend yearly influenza  vaccination   -- Recommend dental visits every 6 months  -- Colon Cancer Screening: due 2031    RTC: 3 months    Manju Lambert MD (Lizzie)  Advanced & Transplant Hepatology  [facility]    I spent 60 minutes on this encounter performing the following: reviewing the patient's medical record (clinic visits, hospital records, lab results, imaging and procedural documentation), history taking, physical exam and documentation on the date of the encounter. I also spent part of the time in coordination of care and counseling.    HPI:  Patient presents with his wife Kira.    Patient reports doing generally well without any acute concerns or complaints.    He reports that this year he was diagnosed with diabetes in the setting of a 30 pound unintentional weight loss in the last year.  He was started on metformin and his hemoglobin A1c dropped from 9.5 to 5.9%.  In the setting of his diabetes diagnosis he also has noticed some neuropathy in his feet but denies any further workup for his neuropathy.  He denies having an eye exam after his diabetes diagnosis    He reports that his weight was 215 pounds at its highest which is still under a BMI of 30.  He denies any history of jaundice, abdominal swelling, lower extremity swelling, slowness of thought or confusion.  Denies any diarrhea or constipation, melena or hematochezia.  He denies any nausea or vomiting in the setting of his unintentional weight loss.    He has had COVID-19 twice in the last couple of years.  His first diagnosis was in February 2022 which he thinks started after his weight loss.  He then got COVID-19 again in September 2023.    He denies any known family history of liver disease.  He has several family members with diabetes.  He denies any over-the-counter medications or supplements except for a multivitamin.  Throughout his life he has not had issues with alcohol overuse; he drinks about 1 alcohol-containing beverage per  month.    Re: possible hemochromatosis - He denies tanning easily. He has some joint pain - feet and knees. Unintentional 30lb weight loss in 1 year. New diagnosis of diabetes. Some lower energy level.      Medical hx Surgical hx   Past Medical History:   Diagnosis Date    Diabetes mellitus, type 2 (H)     Hyperlipidemia       Past Surgical History:   Procedure Laterality Date    APPENDECTOMY      COLONOSCOPY N/A 05/21/2021    Procedure: COLONOSCOPY;  Surgeon: Hipolito Alexander MD;  Location: [facility]          Medications  Current Outpatient Medications   Medication Sig Dispense Refill    metFORMIN (GLUCOPHAGE XR) 500 MG 24 hr tablet Take 1 tablet (500 mg) by mouth daily (with dinner) 90 tablet 3    atorvastatin (LIPITOR) 40 MG tablet Take 1 tablet (40 mg) by mouth daily (Patient not taking: Reported on 12/27/2023) 90 tablet 3     Allergies  No Known Allergies    Family hx Social hx   Family History   Problem Relation Age of Onset    Diabetes Mother     Heart Disease Mother         stent placement.    Morbid Obesity Mother         bypass    Valvular heart disease Father     Diabetes Brother     Liver Disease No family hx of       Social History     Tobacco Use    Smoking status: Never    Smokeless tobacco: Never   Vaping Use    Vaping Use: Never used   Substance Use Topics    Alcohol use: Yes     Comment: rare    Drug use: No     - Employment: Works in heating and air conditioning  - Lives with wife.  Both are Jehovah's Witnesses  - Alcohol: 1 alcohol-containing beverage per month  - Tobacco: Denies  - Drugs: Denies     Review of systems  A 10-point review of systems was negative.    Examination  /77   Pulse 76   Wt 85.5 kg (188 lb 8 oz)   SpO2 97%   BMI 24.87 kg/m     Body mass index is 24.87 kg/m .    Gen-NAD  Eye- EOMI  ENT- MMM  CVS- RRR, no murmurs  RS- CTA bilaterally  Abd-soft, nontender, nondistended.  Surgical scar well-healed  Extr- 2+ radial pulses bilaterally, no lower extremity edema  bilaterally  MS- hands without clubbing  Neuro- A+Ox3, no asterixis  Skin- no jaundice.  No palmar erythema or spider angioma has  Psych- normal mood    Laboratory  BMP  Recent Labs   Lab Test 12/27/23  0655 09/06/23  1543 08/21/23  1301 08/14/23  1536    139 141 141   POTASSIUM 4.4 4.2 4.0 3.8   CHLORIDE 106 105 107 106   NAMRATA 9.3 9.7 9.2 9.6   CO2 27 26 23 26   BUN 12.6 15.0 16.7 20.8*   CR 0.76 0.76 0.74 0.73   * 213* 83 89     CBC  Recent Labs   Lab Test 12/27/23  0655 09/06/23  1543 08/21/23  1301 08/14/23  1536   WBC 2.3* 2.4* 3.3* 3.4*   RBC 4.27* 4.21* 4.26* 4.42   HGB 15.6 15.0 15.3 15.8   HCT 43.1 42.9 43.1 44.7   * 102* 101* 101*   MCH 36.5* 35.6* 35.9* 35.7*   MCHC 36.2 35.0 35.5 35.3   RDW 12.2 12.4 12.3 11.9   PLT 58* 60* 58* 45*     Liver Enzymes   Recent Labs   Lab Test 12/27/23  0655   PROTTOTAL 7.3   ALBUMIN 4.0   BILITOTAL 1.3*   ALKPHOS 149   AST 58*   ALT 63      INR   INR   Date Value Ref Range Status   12/27/2023 1.22 (H) 0.85 - 1.15 Final         Hep B s Ag non-reactive  Hep B c Ab non-reactive  Hep B s Ab 4.97  Hep C Ab non-reactive  HIV non-reactive    Peripheral smear 8/2023  - Moderate thrombocytopenia; no abnormal platelet clumping seen on the slide preparations.  - Hemoglobin quantitatively within normal limits and erythrocytes without diagnostic morphologic abnormality.  - Overall mild leukopenia; individual WBC subsets quantitatively within normal limits and without diagnostic morphologic abnormality.  - Negative for schistocytes and definitive morphologic features of hemolysis.   - Negative for overt features of myelodysplasia and circulating blasts.    Radiology  Abd US 2016: Fatty infiltration of the liver. No gallstones or bile duct dilatation.    9/12/2023 Abd US  1. Cholelithiasis without evidence for cholecystitis.  2. No biliary dilation.  3. Borderline enlarged spleen with no focal lesions.      9/26/2023 US Elastrography  The median liver stiffness is 3.10  m/sec and the IQR/median value is  0.05 . This is a high elastography value indicating advanced chronic  liver disease and portal hypertension.    Endoscopy   Colonoscopy 2021  - Non-bleeding internal hemorrhoids.   - No specimens collected.

## 2023-12-27 ENCOUNTER — OFFICE VISIT (OUTPATIENT)
Dept: GASTROENTEROLOGY | Facility: CLINIC | Age: 58
End: 2023-12-27
Attending: STUDENT IN AN ORGANIZED HEALTH CARE EDUCATION/TRAINING PROGRAM
Payer: COMMERCIAL

## 2023-12-27 ENCOUNTER — PRE VISIT (OUTPATIENT)
Dept: GASTROENTEROLOGY | Facility: CLINIC | Age: 58
End: 2023-12-27

## 2023-12-27 ENCOUNTER — LAB (OUTPATIENT)
Dept: LAB | Facility: CLINIC | Age: 58
End: 2023-12-27
Payer: COMMERCIAL

## 2023-12-27 VITALS
DIASTOLIC BLOOD PRESSURE: 77 MMHG | WEIGHT: 188.5 LBS | SYSTOLIC BLOOD PRESSURE: 127 MMHG | OXYGEN SATURATION: 97 % | HEART RATE: 76 BPM | BODY MASS INDEX: 24.87 KG/M2

## 2023-12-27 DIAGNOSIS — K76.6 PORTAL HYPERTENSION (H): ICD-10-CM

## 2023-12-27 DIAGNOSIS — K76.9 LIVER DISEASE, CHRONIC: ICD-10-CM

## 2023-12-27 DIAGNOSIS — D69.6 THROMBOCYTOPENIA (H): ICD-10-CM

## 2023-12-27 DIAGNOSIS — E83.19 IRON OVERLOAD: Primary | ICD-10-CM

## 2023-12-27 DIAGNOSIS — K74.60 CIRRHOSIS OF LIVER WITHOUT ASCITES, UNSPECIFIED HEPATIC CIRRHOSIS TYPE (H): ICD-10-CM

## 2023-12-27 LAB
ALBUMIN SERPL BCG-MCNC: 4 G/DL (ref 3.5–5.2)
ALP SERPL-CCNC: 149 U/L (ref 40–150)
ALT SERPL W P-5'-P-CCNC: 63 U/L (ref 0–70)
ANION GAP SERPL CALCULATED.3IONS-SCNC: 6 MMOL/L (ref 7–15)
AST SERPL W P-5'-P-CCNC: 58 U/L (ref 0–45)
BILIRUB DIRECT SERPL-MCNC: 0.42 MG/DL (ref 0–0.3)
BILIRUB SERPL-MCNC: 1.3 MG/DL
BUN SERPL-MCNC: 12.6 MG/DL (ref 6–20)
CALCIUM SERPL-MCNC: 9.3 MG/DL (ref 8.6–10)
CHLORIDE SERPL-SCNC: 106 MMOL/L (ref 98–107)
CREAT SERPL-MCNC: 0.76 MG/DL (ref 0.67–1.17)
DEPRECATED HCO3 PLAS-SCNC: 27 MMOL/L (ref 22–29)
EGFRCR SERPLBLD CKD-EPI 2021: >90 ML/MIN/1.73M2
ERYTHROCYTE [DISTWIDTH] IN BLOOD BY AUTOMATED COUNT: 12.2 % (ref 10–15)
FERRITIN SERPL-MCNC: 1247 NG/ML (ref 31–409)
GLUCOSE SERPL-MCNC: 224 MG/DL (ref 70–99)
HAV AB SER QL IA: NONREACTIVE
HCT VFR BLD AUTO: 43.1 % (ref 40–53)
HGB BLD-MCNC: 15.6 G/DL (ref 13.3–17.7)
INR PPP: 1.22 (ref 0.85–1.15)
IRON BINDING CAPACITY (ROCHE): 240 UG/DL (ref 240–430)
IRON SATN MFR SERPL: 55 % (ref 15–46)
IRON SERPL-MCNC: 132 UG/DL (ref 61–157)
MCH RBC QN AUTO: 36.5 PG (ref 26.5–33)
MCHC RBC AUTO-ENTMCNC: 36.2 G/DL (ref 31.5–36.5)
MCV RBC AUTO: 101 FL (ref 78–100)
PLATELET # BLD AUTO: 58 10E3/UL (ref 150–450)
POTASSIUM SERPL-SCNC: 4.4 MMOL/L (ref 3.4–5.3)
PROT SERPL-MCNC: 7.3 G/DL (ref 6.4–8.3)
RBC # BLD AUTO: 4.27 10E6/UL (ref 4.4–5.9)
SODIUM SERPL-SCNC: 139 MMOL/L (ref 135–145)
WBC # BLD AUTO: 2.3 10E3/UL (ref 4–11)

## 2023-12-27 PROCEDURE — 80053 COMPREHEN METABOLIC PANEL: CPT | Performed by: PATHOLOGY

## 2023-12-27 PROCEDURE — 82248 BILIRUBIN DIRECT: CPT | Performed by: PATHOLOGY

## 2023-12-27 PROCEDURE — 99000 SPECIMEN HANDLING OFFICE-LAB: CPT | Performed by: PATHOLOGY

## 2023-12-27 PROCEDURE — 83540 ASSAY OF IRON: CPT | Performed by: PATHOLOGY

## 2023-12-27 PROCEDURE — 85610 PROTHROMBIN TIME: CPT | Performed by: PATHOLOGY

## 2023-12-27 PROCEDURE — G0463 HOSPITAL OUTPT CLINIC VISIT: HCPCS | Performed by: INTERNAL MEDICINE

## 2023-12-27 PROCEDURE — 83516 IMMUNOASSAY NONANTIBODY: CPT | Mod: 90 | Performed by: PATHOLOGY

## 2023-12-27 PROCEDURE — 83550 IRON BINDING TEST: CPT | Performed by: PATHOLOGY

## 2023-12-27 PROCEDURE — 82728 ASSAY OF FERRITIN: CPT | Performed by: PATHOLOGY

## 2023-12-27 PROCEDURE — 81332 SERPINA1 GENE: CPT | Mod: 90 | Performed by: PATHOLOGY

## 2023-12-27 PROCEDURE — 85027 COMPLETE CBC AUTOMATED: CPT | Performed by: PATHOLOGY

## 2023-12-27 PROCEDURE — 82103 ALPHA-1-ANTITRYPSIN TOTAL: CPT | Mod: 90 | Performed by: PATHOLOGY

## 2023-12-27 PROCEDURE — 86708 HEPATITIS A ANTIBODY: CPT | Performed by: INTERNAL MEDICINE

## 2023-12-27 PROCEDURE — 36415 COLL VENOUS BLD VENIPUNCTURE: CPT | Performed by: PATHOLOGY

## 2023-12-27 PROCEDURE — 99205 OFFICE O/P NEW HI 60 MIN: CPT | Performed by: INTERNAL MEDICINE

## 2023-12-27 PROCEDURE — 86038 ANTINUCLEAR ANTIBODIES: CPT | Performed by: INTERNAL MEDICINE

## 2023-12-27 PROCEDURE — G0480 DRUG TEST DEF 1-7 CLASSES: HCPCS | Mod: 90 | Performed by: PATHOLOGY

## 2023-12-27 NOTE — LETTER
12/27/2023         RE: Demetrius Burton  [address] [facility] [address]        Dear Colleague,    Thank you for referring your patient, Demetrius Burton, to the [facility] [place]. Please see a copy of my visit note below.    [facility] Hepatology    New Patient Visit    Referring provider:  Alfreda Salas  Chief complaint:  Concern for liver disease    Assessment  58 year old male with past medical history of type 2 diabetes who presents with concerns for advanced liver disease    #.  Concern for advanced fibrosis versus cirrhosis  #.  Abnormal liver studies  #.  Thrombocytopenia  Patient with a history of metabolic syndrome -type 2 diabetes and hyperlipidemia.  He was overweight in the past but was never had a BMI greater than 30.  Denies any history of alcohol overuse.  Denies any family history of liver disease. The patient has had abnormal liver studies since 2006 with ALT > AST- this was never really investigated given the patient never really saw a physician -concerned that he may have had fatty liver disease at this time that progressed to fibrosis.  Given his platelet count of less than 150 this is concerning for portal hypertension along with mild splenomegaly on imaging as well as ultrasound elastography with concern for fibrosis. His INR is mildly elevated which is concerning for some degree of synthetic dysfunction.    The differential for his liver disease remains broad and includes fatty liver disease, hemochromatosis given his elevated iron saturation to 55% + ferritin to 1k (despite no alcohol), and less likely autoimmune related liver injury. HBV and HCV studies negative. Abdominal ultrasound with no biliary ductal dilation.  His alkaline phosphatase on last check was within normal limits.  He has a predominantly unconjugated hyperbilirubinemia.     Plan  -- Follow-up PETH, CRISTINA, F-actin and alpha-1 antitrypsin  -- Follow up HFE; ordered   -- Plan for  IR guided liver biopsy for assessment of potential etiology of liver disease as well as staging of his fibrosis  -- No contraindication to statins from a liver perspective    Health Maintenance:  -- COVID Vaccination s/p 3 doses  -- Recommend yearly influenza vaccination   -- Recommend dental visits every 6 months  -- Colon Cancer Screening: due 2031    RTC: 3 months    Manju Lambert MD (Lizzie)  Advanced & Transplant Hepatology  [facility]    I spent 60 minutes on this encounter performing the following: reviewing the patient's medical record (clinic visits, hospital records, lab results, imaging and procedural documentation), history taking, physical exam and documentation on the date of the encounter. I also spent part of the time in coordination of care and counseling.    HPI:  Patient presents with his wife Kira.    Patient reports doing generally well without any acute concerns or complaints.    He reports that this year he was diagnosed with diabetes in the setting of a 30 pound unintentional weight loss in the last year.  He was started on metformin and his hemoglobin A1c dropped from 9.5 to 5.9%.  In the setting of his diabetes diagnosis he also has noticed some neuropathy in his feet but denies any further workup for his neuropathy.  He denies having an eye exam after his diabetes diagnosis    He reports that his weight was 215 pounds at its highest which is still under a BMI of 30.  He denies any history of jaundice, abdominal swelling, lower extremity swelling, slowness of thought or confusion.  Denies any diarrhea or constipation, melena or hematochezia.  He denies any nausea or vomiting in the setting of his unintentional weight loss.    He has had COVID-19 twice in the last couple of years.  His first diagnosis was in February 2022 which he thinks started after his weight loss.  He then got COVID-19 again in September 2023.    He denies any known family history of liver  disease.  He has several family members with diabetes.  He denies any over-the-counter medications or supplements except for a multivitamin.  Throughout his life he has not had issues with alcohol overuse; he drinks about 1 alcohol-containing beverage per month.    Re: possible hemochromatosis - He denies tanning easily. He has some joint pain - feet and knees. Unintentional 30lb weight loss in 1 year. New diagnosis of diabetes. Some lower energy level.      Medical hx Surgical hx   Past Medical History:   Diagnosis Date    Diabetes mellitus, type 2 (H)     Hyperlipidemia       Past Surgical History:   Procedure Laterality Date    APPENDECTOMY      COLONOSCOPY N/A 05/21/2021    Procedure: COLONOSCOPY;  Surgeon: Hipolito Alexander MD;  Location: [facility]          Medications  Current Outpatient Medications   Medication Sig Dispense Refill    metFORMIN (GLUCOPHAGE XR) 500 MG 24 hr tablet Take 1 tablet (500 mg) by mouth daily (with dinner) 90 tablet 3    atorvastatin (LIPITOR) 40 MG tablet Take 1 tablet (40 mg) by mouth daily (Patient not taking: Reported on 12/27/2023) 90 tablet 3     Allergies  No Known Allergies    Family hx Social hx   Family History   Problem Relation Age of Onset    Diabetes Mother     Heart Disease Mother         stent placement.    Morbid Obesity Mother         bypass    Valvular heart disease Father     Diabetes Brother     Liver Disease No family hx of       Social History     Tobacco Use    Smoking status: Never    Smokeless tobacco: Never   Vaping Use    Vaping Use: Never used   Substance Use Topics    Alcohol use: Yes     Comment: rare    Drug use: No     - Employment: Works in heating and air conditioning  - Lives with wife.  Both are Jehovah's Witnesses  - Alcohol: 1 alcohol-containing beverage per month  - Tobacco: Denies  - Drugs: Denies     Review of systems  A 10-point review of systems was negative.    Examination  /77   Pulse 76   Wt 85.5 kg (188 lb 8 oz)   SpO2 97%   BMI 24.87  kg/m     Body mass index is 24.87 kg/m .    Gen-NAD  Eye- EOMI  ENT- MMM  CVS- RRR, no murmurs  RS- CTA bilaterally  Abd-soft, nontender, nondistended.  Surgical scar well-healed  Extr- 2+ radial pulses bilaterally, no lower extremity edema bilaterally  MS- hands without clubbing  Neuro- A+Ox3, no asterixis  Skin- no jaundice.  No palmar erythema or spider angioma has  Psych- normal mood    Laboratory  BMP  Recent Labs   Lab Test 12/27/23  0655 09/06/23  1543 08/21/23  1301 08/14/23  1536    139 141 141   POTASSIUM 4.4 4.2 4.0 3.8   CHLORIDE 106 105 107 106   NAMRATA 9.3 9.7 9.2 9.6   CO2 27 26 23 26   BUN 12.6 15.0 16.7 20.8*   CR 0.76 0.76 0.74 0.73   * 213* 83 89     CBC  Recent Labs   Lab Test 12/27/23  0655 09/06/23  1543 08/21/23  1301 08/14/23  1536   WBC 2.3* 2.4* 3.3* 3.4*   RBC 4.27* 4.21* 4.26* 4.42   HGB 15.6 15.0 15.3 15.8   HCT 43.1 42.9 43.1 44.7   * 102* 101* 101*   MCH 36.5* 35.6* 35.9* 35.7*   MCHC 36.2 35.0 35.5 35.3   RDW 12.2 12.4 12.3 11.9   PLT 58* 60* 58* 45*     Liver Enzymes   Recent Labs   Lab Test 12/27/23  0655   PROTTOTAL 7.3   ALBUMIN 4.0   BILITOTAL 1.3*   ALKPHOS 149   AST 58*   ALT 63      INR   INR   Date Value Ref Range Status   12/27/2023 1.22 (H) 0.85 - 1.15 Final         Hep B s Ag non-reactive  Hep B c Ab non-reactive  Hep B s Ab 4.97  Hep C Ab non-reactive  HIV non-reactive    Peripheral smear 8/2023  - Moderate thrombocytopenia; no abnormal platelet clumping seen on the slide preparations.  - Hemoglobin quantitatively within normal limits and erythrocytes without diagnostic morphologic abnormality.  - Overall mild leukopenia; individual WBC subsets quantitatively within normal limits and without diagnostic morphologic abnormality.  - Negative for schistocytes and definitive morphologic features of hemolysis.   - Negative for overt features of myelodysplasia and circulating blasts.    Radiology  Abd US 2016: Fatty infiltration of the liver. No gallstones or  bile duct dilatation.    9/12/2023 Abd US  1. Cholelithiasis without evidence for cholecystitis.  2. No biliary dilation.  3. Borderline enlarged spleen with no focal lesions.      9/26/2023 US Elastrography  The median liver stiffness is 3.10 m/sec and the IQR/median value is  0.05 . This is a high elastography value indicating advanced chronic  liver disease and portal hypertension.    Endoscopy   Colonoscopy 2021  - Non-bleeding internal hemorrhoids.   - No specimens collected.        Manju Lambert MD

## 2023-12-27 NOTE — NURSING NOTE
Chief Complaint   Patient presents with    RECHECK     Referral for portal hypertension     Pulse 76   Wt 85.5 kg (188 lb 8 oz)   SpO2 97%   BMI 24.87 kg/m    Helen Woodson on 12/27/2023 at 7:57 AM

## 2023-12-28 LAB — ANA SER QL IF: NEGATIVE

## 2023-12-29 LAB
LABORATORY REPORT: NORMAL
PETH INTERPRETATION: NORMAL
PLPETH BLD-MCNC: <10 NG/ML
POPETH BLD-MCNC: <10 NG/ML
SMA IGG SER-ACNC: 18 UNITS

## 2023-12-30 LAB
A1AT PHENOTYP SERPL-IMP: ABNORMAL
A1AT SERPL-MCNC: 129 MG/DL
A1AT SS SERPL-MCNC: ABNORMAL G/L
A1AT SZ SERPL-MCNC: ABNORMAL G/L
A1AT ZZ SERPL-MCNC: NEGATIVE G/L
SPECIMEN SOURCE: ABNORMAL

## 2024-01-03 ENCOUNTER — ANESTHESIA EVENT (OUTPATIENT)
Dept: SURGERY | Facility: AMBULATORY SURGERY CENTER | Age: 59
End: 2024-01-03
Payer: COMMERCIAL

## 2024-01-04 NOTE — ANESTHESIA PREPROCEDURE EVALUATION
Anesthesia Pre-Procedure Evaluation    Patient: Demetrius Bruton   MRN: [id number] : 1965        Procedure : Procedure(s):  Percutaneous biopsy liver          Past Medical History:   Diagnosis Date    Diabetes mellitus, type 2 (H)     Hyperlipidemia       Past Surgical History:   Procedure Laterality Date    APPENDECTOMY      COLONOSCOPY N/A 2021    Procedure: COLONOSCOPY;  Surgeon: Hipolito Alexander MD;  Location: [facility]      No Known Allergies   Social History     Tobacco Use    Smoking status: Never    Smokeless tobacco: Never   Substance Use Topics    Alcohol use: Yes     Comment: rare      Wt Readings from Last 1 Encounters:   23 85.5 kg (188 lb 8 oz)        Anesthesia Evaluation            ROS/MED HX  ENT/Pulmonary:  - neg pulmonary ROS     Neurologic:  - neg neurologic ROS     Cardiovascular:     (+) Dyslipidemia - -   -  - -                                      METS/Exercise Tolerance: >4 METS    Hematologic:  - neg hematologic  ROS     Musculoskeletal:  - neg musculoskeletal ROS     GI/Hepatic:  - neg GI/hepatic ROS     Renal/Genitourinary:  - neg Renal ROS     Endo:     (+)  type II DM,                    Psychiatric/Substance Use:  - neg psychiatric ROS     Infectious Disease:  - neg infectious disease ROS     Malignancy:       Other:            Physical Exam    Airway  airway exam normal      Mallampati: II       Respiratory Devices and Support         Dental       (+) Minor Abnormalities - some fillings, tiny chips      Cardiovascular   cardiovascular exam normal          Pulmonary   pulmonary exam normal                OUTSIDE LABS:  CBC:   Lab Results   Component Value Date    WBC 2.3 (L) 2023    WBC 2.4 (L) 2023    HGB 15.6 2023    HGB 15.0 2023    HCT 43.1 2023    HCT 42.9 2023    PLT 58 (L) 2023    PLT 60 (L) 2023     BMP:   Lab Results   Component Value Date     2023     2023    POTASSIUM 4.4 2023     "POTASSIUM 4.2 09/06/2023    CHLORIDE 106 12/27/2023    CHLORIDE 105 09/06/2023    CO2 27 12/27/2023    CO2 26 09/06/2023    BUN 12.6 12/27/2023    BUN 15.0 09/06/2023    CR 0.76 12/27/2023    CR 0.76 09/06/2023     (H) 12/27/2023     (H) 09/06/2023     COAGS:   Lab Results   Component Value Date    PTT 36 08/21/2023    INR 1.22 (H) 12/27/2023     POC: No results found for: \"BGM\", \"HCG\", \"HCGS\"  HEPATIC:   Lab Results   Component Value Date    ALBUMIN 4.0 12/27/2023    PROTTOTAL 7.3 12/27/2023    ALT 63 12/27/2023    AST 58 (H) 12/27/2023    ALKPHOS 149 12/27/2023    BILITOTAL 1.3 (H) 12/27/2023     OTHER:   Lab Results   Component Value Date    A1C 5.9 (H) 12/04/2023    NAMRATA 9.3 12/27/2023    TSH 2.61 08/14/2023       Anesthesia Plan    ASA Status:  2    NPO Status:  NPO Appropriate    Anesthesia Type: MAC.     - Reason for MAC: straight local not clinically adequate   Induction: Intravenous.   Maintenance: TIVA.        Consents    Anesthesia Plan(s) and associated risks, benefits, and realistic alternatives discussed. Questions answered and patient/representative(s) expressed understanding.     - Discussed: Risks, Benefits and Alternatives for the PROCEDURE were discussed     - Discussed with:  Patient            Postoperative Care    Pain management: IV analgesics, Oral pain medications, Multi-modal analgesia.   PONV prophylaxis: Ondansetron (or other 5HT-3), Dexamethasone or Solumedrol, Background Propofol Infusion     Comments:               Kishor Woodward MD    I have reviewed the pertinent notes and labs in the chart from the past 30 days and (re)examined the patient.  Any updates or changes from those notes are reflected in this note.            # Coagulation Defect: INR = 1.22 (Ref range: 0.85 - 1.15) and/or PTT = N/A, will monitor for bleeding  # Thrombocytopenia: Lowest platelets = 58 in last 30 days, will monitor for bleeding      "

## 2024-01-05 ENCOUNTER — HOSPITAL ENCOUNTER (OUTPATIENT)
Facility: AMBULATORY SURGERY CENTER | Age: 59
Discharge: HOME | End: 2024-01-05
Attending: STUDENT IN AN ORGANIZED HEALTH CARE EDUCATION/TRAINING PROGRAM | Admitting: STUDENT IN AN ORGANIZED HEALTH CARE EDUCATION/TRAINING PROGRAM
Payer: COMMERCIAL

## 2024-01-05 ENCOUNTER — ANCILLARY PROCEDURE (OUTPATIENT)
Dept: RADIOLOGY | Facility: AMBULATORY SURGERY CENTER | Age: 59
End: 2024-01-05
Attending: INTERNAL MEDICINE
Payer: COMMERCIAL

## 2024-01-05 ENCOUNTER — ANESTHESIA (OUTPATIENT)
Dept: SURGERY | Facility: AMBULATORY SURGERY CENTER | Age: 59
End: 2024-01-05
Payer: COMMERCIAL

## 2024-01-05 VITALS
SYSTOLIC BLOOD PRESSURE: 120 MMHG | HEIGHT: 74 IN | WEIGHT: 180 LBS | DIASTOLIC BLOOD PRESSURE: 79 MMHG | BODY MASS INDEX: 23.1 KG/M2 | HEART RATE: 98 BPM | TEMPERATURE: 97.7 F | OXYGEN SATURATION: 99 % | RESPIRATION RATE: 16 BRPM

## 2024-01-05 DIAGNOSIS — K76.6 PORTAL HYPERTENSION (H): ICD-10-CM

## 2024-01-05 LAB — GLUCOSE BLDC GLUCOMTR-MCNC: 135 MG/DL (ref 70–99)

## 2024-01-05 PROCEDURE — 88307 TISSUE EXAM BY PATHOLOGIST: CPT | Mod: 26 | Performed by: PATHOLOGY

## 2024-01-05 PROCEDURE — 47000 NEEDLE BIOPSY OF LIVER PERQ: CPT

## 2024-01-05 PROCEDURE — 76942 ECHO GUIDE FOR BIOPSY: CPT | Mod: 26 | Performed by: STUDENT IN AN ORGANIZED HEALTH CARE EDUCATION/TRAINING PROGRAM

## 2024-01-05 PROCEDURE — 82962 GLUCOSE BLOOD TEST: CPT | Performed by: PATHOLOGY

## 2024-01-05 PROCEDURE — 47000 NEEDLE BIOPSY OF LIVER PERQ: CPT | Performed by: STUDENT IN AN ORGANIZED HEALTH CARE EDUCATION/TRAINING PROGRAM

## 2024-01-05 PROCEDURE — 88313 SPECIAL STAINS GROUP 2: CPT | Mod: 26 | Performed by: PATHOLOGY

## 2024-01-05 PROCEDURE — 88307 TISSUE EXAM BY PATHOLOGIST: CPT | Mod: TC | Performed by: STUDENT IN AN ORGANIZED HEALTH CARE EDUCATION/TRAINING PROGRAM

## 2024-01-05 RX ORDER — SODIUM CHLORIDE 9 MG/ML
INJECTION, SOLUTION INTRAVENOUS CONTINUOUS
Status: DISCONTINUED | OUTPATIENT
Start: 2024-01-05 | End: 2024-01-06 | Stop reason: HOSPADM

## 2024-01-05 RX ORDER — OXYCODONE HYDROCHLORIDE 5 MG/1
10 TABLET ORAL
Status: DISCONTINUED | OUTPATIENT
Start: 2024-01-05 | End: 2024-01-06 | Stop reason: HOSPADM

## 2024-01-05 RX ORDER — ONDANSETRON 4 MG/1
4 TABLET, ORALLY DISINTEGRATING ORAL EVERY 30 MIN PRN
Status: DISCONTINUED | OUTPATIENT
Start: 2024-01-05 | End: 2024-01-06 | Stop reason: HOSPADM

## 2024-01-05 RX ORDER — LIDOCAINE HYDROCHLORIDE 20 MG/ML
INJECTION, SOLUTION INFILTRATION; PERINEURAL PRN
Status: DISCONTINUED | OUTPATIENT
Start: 2024-01-05 | End: 2024-01-05

## 2024-01-05 RX ORDER — OXYCODONE HYDROCHLORIDE 5 MG/1
5 TABLET ORAL
Status: DISCONTINUED | OUTPATIENT
Start: 2024-01-05 | End: 2024-01-06 | Stop reason: HOSPADM

## 2024-01-05 RX ORDER — PROPOFOL 10 MG/ML
INJECTION, EMULSION INTRAVENOUS CONTINUOUS PRN
Status: DISCONTINUED | OUTPATIENT
Start: 2024-01-05 | End: 2024-01-05

## 2024-01-05 RX ORDER — ONDANSETRON 2 MG/ML
4 INJECTION INTRAMUSCULAR; INTRAVENOUS EVERY 30 MIN PRN
Status: DISCONTINUED | OUTPATIENT
Start: 2024-01-05 | End: 2024-01-06 | Stop reason: HOSPADM

## 2024-01-05 RX ORDER — ACETAMINOPHEN 325 MG/1
975 TABLET ORAL ONCE
Status: COMPLETED | OUTPATIENT
Start: 2024-01-05 | End: 2024-01-05

## 2024-01-05 RX ORDER — LIDOCAINE 40 MG/G
CREAM TOPICAL
Status: DISCONTINUED | OUTPATIENT
Start: 2024-01-05 | End: 2024-01-06 | Stop reason: HOSPADM

## 2024-01-05 RX ORDER — SODIUM CHLORIDE, SODIUM LACTATE, POTASSIUM CHLORIDE, CALCIUM CHLORIDE 600; 310; 30; 20 MG/100ML; MG/100ML; MG/100ML; MG/100ML
INJECTION, SOLUTION INTRAVENOUS CONTINUOUS
Status: DISCONTINUED | OUTPATIENT
Start: 2024-01-05 | End: 2024-01-06 | Stop reason: HOSPADM

## 2024-01-05 RX ORDER — PROPOFOL 10 MG/ML
INJECTION, EMULSION INTRAVENOUS PRN
Status: DISCONTINUED | OUTPATIENT
Start: 2024-01-05 | End: 2024-01-05

## 2024-01-05 RX ADMIN — LIDOCAINE HYDROCHLORIDE 80 MG: 20 INJECTION, SOLUTION INFILTRATION; PERINEURAL at 08:10

## 2024-01-05 RX ADMIN — SODIUM CHLORIDE, SODIUM LACTATE, POTASSIUM CHLORIDE, CALCIUM CHLORIDE: 600; 310; 30; 20 INJECTION, SOLUTION INTRAVENOUS at 06:54

## 2024-01-05 RX ADMIN — PROPOFOL 70 MG: 10 INJECTION, EMULSION INTRAVENOUS at 08:10

## 2024-01-05 RX ADMIN — PROPOFOL 150 MCG/KG/MIN: 10 INJECTION, EMULSION INTRAVENOUS at 08:10

## 2024-01-05 RX ADMIN — ACETAMINOPHEN 975 MG: 325 TABLET ORAL at 06:42

## 2024-01-05 NOTE — ANESTHESIA CARE TRANSFER NOTE
Patient: Demetrius Burton    Procedure: Procedure(s):  Percutaneous biopsy liver       Diagnosis: Portal hypertension (H) [K76.6]  Diagnosis Additional Information: No value filed.    Anesthesia Type:   MAC     Note:    Oropharynx: oropharynx clear of all foreign objects and spontaneously breathing  Level of Consciousness: drowsy  Oxygen Supplementation: room air    Independent Airway: airway patency satisfactory and stable  Dentition: dentition unchanged  Vital Signs Stable: post-procedure vital signs reviewed and stable  Report to RN Given: handoff report given  Patient transferred to: Phase II  Comments: Vital signs per nursing documentation.       Handoff Report: Identifed the Patient, Identified the Reponsible Provider, Reviewed the pertinent medical history, Discussed the surgical course, Reviewed Intra-OP anesthesia mangement and issues during anesthesia, Set expectations for post-procedure period and Allowed opportunity for questions and acknowledgement of understanding      Vitals:  Vitals Value Taken Time   BP     Temp 97.6    Pulse 70    Resp 13    SpO2 95%        Electronically Signed By: EDUARDO Friedman CRNA  January 5, 2024  8:38 AM

## 2024-01-05 NOTE — ANESTHESIA POSTPROCEDURE EVALUATION
Patient: Demetrius Burton    Procedure: Procedure(s):  Percutaneous biopsy liver       Anesthesia Type:  MAC    Note:  Disposition: Outpatient   Postop Pain Control: Uneventful            Sign Out: Well controlled pain   PONV: No   Neuro/Psych: Uneventful            Sign Out: Acceptable/Baseline neuro status   Airway/Respiratory: Uneventful            Sign Out: Acceptable/Baseline resp. status   CV/Hemodynamics: Uneventful            Sign Out: Acceptable CV status; No obvious hypovolemia; No obvious fluid overload   Other NRE: NONE   DID A NON-ROUTINE EVENT OCCUR?            Last vitals:  Vitals Value Taken Time   /79 01/05/24 0936   Temp 36.5  C (97.7  F) 01/05/24 0936   Pulse 98 01/05/24 0936   Resp 16 01/05/24 0936   SpO2 99 % 01/05/24 0936       Electronically Signed By: Kishor Woodward MD  January 5, 2024  12:09 PM

## 2024-01-05 NOTE — DISCHARGE INSTRUCTIONS
A collaboration between [facility] and [facility]  Experts in minimally invasive, targeted treatments performed using imaging guidance    Liver Biopsy    Today you had a needle remove a piece of tissue from your liver.    After you go home:  Keep any applied tape/gauze dressings clean and dry.  Change tape/gauze dressings if they get wet or soiled.  You may remove the dressing 48 hours after the procedure.  Don't shower until 48 hours after the procedure.  Do not perform strenuous activities or lift greater than 10 pounds for the next three days.  If there is bleeding or oozing from the procedure site, apply firm pressure to the area for 5-10 minutes.  If the bleeding continues seek medical advice at the numbers below.    For 24 hours after any sedation used:  Relax and take it easy.  No strenuous activities.  Do not drive or operate machines at home or at work.  No alcohol consumption.  Do not make any important or legal decisions.    Call our Interventional Radiology (IR) service if:  If you start bleeding from the procedure site.  If you do start to bleed from the site, lie down and hold some pressure on the site.  Our radiology provider can help you decide if you need to return to the hospital.  If you feel dizzy or lightheaded.  If you suddenly feel short of breath.  If you have persistent pain at biopsy site.  If the skin around the biopsy sight is swollen, reddened, painful, or has any discharge.  If you feel nauseated and  just not right .  If you have a fever of greater than 100.5  F and chills in the first 5 days after procedure.  Any other concerns related to your procedure.    [facility]  Interventional Radiology (IR)  [address] [place] [address]    Contact Number:  [phone]  (IR control desk)  Monday - Friday 8:00 am - 4:30 pm    After hours for urgent concerns:   [phone]  After 4:30 pm Monday - Friday, Weekends and Holidays.   Ask for Interventional Radiology on-call.  Someone is available 24 hours a day.  [facility] toll free number:  [phone]

## 2024-01-09 LAB
PATH REPORT.COMMENTS IMP SPEC: NORMAL
PATH REPORT.FINAL DX SPEC: NORMAL
PATH REPORT.GROSS SPEC: NORMAL
PATH REPORT.MICROSCOPIC SPEC OTHER STN: NORMAL
PATH REPORT.RELEVANT HX SPEC: NORMAL
PHOTO IMAGE: NORMAL

## 2024-01-11 DIAGNOSIS — K74.60 CIRRHOSIS OF LIVER WITHOUT ASCITES, UNSPECIFIED HEPATIC CIRRHOSIS TYPE (H): Primary | ICD-10-CM

## 2024-01-19 ENCOUNTER — LAB (OUTPATIENT)
Dept: LAB | Facility: CLINIC | Age: 59
End: 2024-01-19
Payer: COMMERCIAL

## 2024-01-19 DIAGNOSIS — E83.19 IRON OVERLOAD: ICD-10-CM

## 2024-01-19 LAB
LAB DIRECTOR COMMENTS: NORMAL
LAB DIRECTOR DISCLAIMER: NORMAL
LAB DIRECTOR INTERPRETATION: NORMAL
LAB DIRECTOR METHODOLOGY: NORMAL
LAB DIRECTOR RESULTS: NORMAL
SPECIMEN DESCRIPTION: NORMAL

## 2024-01-19 PROCEDURE — 36415 COLL VENOUS BLD VENIPUNCTURE: CPT

## 2024-01-19 PROCEDURE — G0452 MOLECULAR PATHOLOGY INTERPR: HCPCS | Performed by: PATHOLOGY

## 2024-01-19 PROCEDURE — 81256 HFE GENE: CPT

## 2024-01-29 ENCOUNTER — PATIENT OUTREACH (OUTPATIENT)
Dept: ONCOLOGY | Facility: CLINIC | Age: 59
End: 2024-01-29
Payer: COMMERCIAL

## 2024-01-29 ENCOUNTER — TELEPHONE (OUTPATIENT)
Dept: GASTROENTEROLOGY | Facility: CLINIC | Age: 59
End: 2024-01-29
Payer: COMMERCIAL

## 2024-01-29 DIAGNOSIS — E83.19 IRON OVERLOAD: Primary | ICD-10-CM

## 2024-01-29 NOTE — PROGRESS NOTES
Hematology referral reviewed for [facility], see below.    Referral reason: referral received via staff message for iron overload with H63D heterozygote. Iron deposits found on liver biopsy.     Clinical question entered by referring provider or through order transcription: referral placed by writer    Referral received via: Staff message    Current abnormal labs: Available in Chart Review    Outreach: Referral discussed with patient. Patient declined next day appointment and is aware that the next opening is not until March. Will coordinate scheduling instructions and send to NPS for completion. Patient aware that he will receive a call from the scheduling team.

## 2024-01-29 NOTE — PROGRESS NOTES
Demetrius is a 57 y/o M with PMHx of HL + type II DM. BMI < 30. INR 1.22. Total bilirubin: 1.3. Platelet count: 58.  He underwent a liver biopsy that demonstrated cirrhosis with hemosiderosis (3-4 iron/4). Iron deposition in hepatocytes. Iron sat: 55% with last ferritin 1,247. H63D heterozygote only -however H63D heterozygote only does not contribute to iron overload on its own.     In terms of other etiologies of liver disease his and comprehensive workup has been pretty unremarkable.  His alpha-1 antitrypsin testing demonstrated that he was an S heterozygote but PAS stains on his liver biopsy did not demonstrate any abnormal cytoplasmic inclusions.  He has some risk factors for metabolic associated steatotic liver disease including hyperlipidemia and type 2 diabetes but pretty well-controlled and BMI less than 30.  He has no history of alcohol overuse from what he told me and his Peth testing was negative.     I called the patient and his wife and discussed on speaker phone for 22 minutes the findings above and the need for further evaluation.      - The patient reiterated to me that he is not consuming alcohol  - The patient has already started to work on cutting out dietary sources of iron and he has been cooking in his cast iron pan lEss.  - The patient had a peripheral smear done in August 2023 which did not demonstrate any evidence of hemolysis    A message was sent to Dr. Jacob Cogan who agreed that this would be a reasonable patient to see in hematology clinic.    Plan:  - Cardiac MRI to evaluate for signs of iron overload in the heart  - Referral to hematology to discuss other causes of iron overload and consider non-HFE genetic causes of iron overload   - On the phone we discussed possible phlebotomy but the patient's wife had reservations about doing this. They are interested in seeing hematology prior to considering this. We discussed what phlebotomy would entail

## 2024-01-29 NOTE — TELEPHONE ENCOUNTER
"Endoscopy Scheduling Screen    Have you had a positive Covid test in the last 14 days?  No    Are you active on MyChart?   Yes    What insurance is in the chart?  Other:  [facility]    Ordering/Referring Provider:     CRISPIN MCCARTY      (If ordering provider performs procedure, schedule with ordering provider unless otherwise instructed. )    BMI: Estimated body mass index is 23.11 kg/m  as calculated from the following:    Height as of 1/5/24: 1.88 m (6' 2\").    Weight as of 1/5/24: 81.6 kg (180 lb).     Sedation Ordered  MAC/deep sedation.   BMI<= 45 45 < BMI <= 48 48 < BMI < = 50  BMI > 50   No Restrictions No [facility]  No [facility]  [facility] with exceptions Hospital Only OR Only       Are you taking any prescription medications for pain 3 or more times per week?   NO - No RN review required.    Do you have a history of malignant hyperthermia or adverse reaction to anesthesia?  No    (Females) Are you currently pregnant?        Have you been diagnosed or told you have pulmonary hypertension?   No    Do you have an LVAD?  No    Have you been told you have moderate to severe sleep apnea?  No    Have you been told you have COPD, asthma, or any other lung disease?  No    Do you have any heart conditions?  No     Have you ever had an organ transplant?   No    Have you ever had or are you awaiting a heart or lung transplant?   No    Have you had a stroke or transient ischemic attack (TIA aka \"mini stroke\" in the last 6 months?   No    Have you been diagnosed with or been told you have cirrhosis of the liver?   No    Are you currently on dialysis?   No    Do you need assistance transferring?   No    BMI: Estimated body mass index is 23.11 kg/m  as calculated from the following:    Height as of 1/5/24: 1.88 m (6' 2\").    Weight as of 1/5/24: 81.6 kg (180 lb).     Is patients BMI > 40 and scheduling location [facility]?  No    Do you take an injectable medication for weight loss or diabetes (excluding insulin)?  No    Do " you take the medication Naltrexone?  No    Do you take blood thinners?  No       Prep   Are you currently on dialysis or do you have chronic kidney disease?  No    Do you have a diagnosis of diabetes?  Yes (Golytely Prep)    Do you have a diagnosis of cystic fibrosis (CF)?  No    On a regular basis do you go 3 -5 days between bowel movements?      BMI > 40?      Preferred Pharmacy:    [id number] [place] [address] [place] [address] [address] [facility] [address]  Phone: [phone] Fax: [phone]      Final Scheduling Details   Colonoscopy prep sent?      Procedure scheduled  Upper endoscopy (EGD)    Surgeon:  LUL     Date of procedure:  5/22     Pre-OP / PAC:   No - Not required for this site.    Location  [facility] - ASC - Per order.    Sedation   MAC/Deep Sedation - Per order.      Patient Reminders:   You will receive a call from a Nurse to review instructions and health history.  This assessment must be completed prior to your procedure.  Failure to complete the Nurse assessment may result in the procedure being cancelled.      On the day of your procedure, please designate an adult(s) who can drive you home stay with you for the next 24 hours. The medicines used in the exam will make you sleepy. You will not be able to drive.      You cannot take public transportation, ride share services, or non-medical taxi service without a responsible caregiver.  Medical transport services are allowed with the requirement that a responsible caregiver will receive you at your destination.  We require that drivers and caregivers are confirmed prior to your procedure.

## 2024-01-30 ENCOUNTER — APPOINTMENT (OUTPATIENT)
Dept: LAB | Facility: CLINIC | Age: 59
End: 2024-01-30
Attending: INTERNAL MEDICINE
Payer: COMMERCIAL

## 2024-01-30 ENCOUNTER — PATIENT OUTREACH (OUTPATIENT)
Dept: ONCOLOGY | Facility: CLINIC | Age: 59
End: 2024-01-30

## 2024-01-30 ENCOUNTER — PRE VISIT (OUTPATIENT)
Dept: TRANSPLANT | Facility: CLINIC | Age: 59
End: 2024-01-30
Payer: COMMERCIAL

## 2024-01-30 ENCOUNTER — ONCOLOGY VISIT (OUTPATIENT)
Dept: TRANSPLANT | Facility: CLINIC | Age: 59
End: 2024-01-30
Attending: INTERNAL MEDICINE
Payer: COMMERCIAL

## 2024-01-30 VITALS
SYSTOLIC BLOOD PRESSURE: 118 MMHG | OXYGEN SATURATION: 99 % | TEMPERATURE: 98.2 F | RESPIRATION RATE: 16 BRPM | BODY MASS INDEX: 24.66 KG/M2 | WEIGHT: 192.1 LBS | HEART RATE: 72 BPM | DIASTOLIC BLOOD PRESSURE: 69 MMHG

## 2024-01-30 DIAGNOSIS — E83.19 IRON OVERLOAD: ICD-10-CM

## 2024-01-30 LAB
ALBUMIN SERPL BCG-MCNC: 3.8 G/DL (ref 3.5–5.2)
ALP SERPL-CCNC: 186 U/L (ref 40–150)
ALT SERPL W P-5'-P-CCNC: 69 U/L (ref 0–70)
ANION GAP SERPL CALCULATED.3IONS-SCNC: 8 MMOL/L (ref 7–15)
AST SERPL W P-5'-P-CCNC: 56 U/L (ref 0–45)
BASOPHILS # BLD AUTO: 0 10E3/UL (ref 0–0.2)
BASOPHILS NFR BLD AUTO: 1 %
BILIRUB SERPL-MCNC: 1.1 MG/DL
BUN SERPL-MCNC: 15.7 MG/DL (ref 6–20)
CALCIUM SERPL-MCNC: 9.3 MG/DL (ref 8.6–10)
CHLORIDE SERPL-SCNC: 105 MMOL/L (ref 98–107)
CREAT SERPL-MCNC: 0.68 MG/DL (ref 0.67–1.17)
CRP SERPL-MCNC: <3 MG/L
DEPRECATED HCO3 PLAS-SCNC: 23 MMOL/L (ref 22–29)
EGFRCR SERPLBLD CKD-EPI 2021: >90 ML/MIN/1.73M2
EOSINOPHIL # BLD AUTO: 0.1 10E3/UL (ref 0–0.7)
EOSINOPHIL NFR BLD AUTO: 3 %
ERYTHROCYTE [DISTWIDTH] IN BLOOD BY AUTOMATED COUNT: 11.9 % (ref 10–15)
ERYTHROCYTE [SEDIMENTATION RATE] IN BLOOD BY WESTERGREN METHOD: 16 MM/HR (ref 0–20)
FERRITIN SERPL-MCNC: 1222 NG/ML (ref 31–409)
GLUCOSE SERPL-MCNC: 310 MG/DL (ref 70–99)
HCT VFR BLD AUTO: 40.7 % (ref 40–53)
HGB BLD-MCNC: 14.9 G/DL (ref 13.3–17.7)
IGG SERPL-MCNC: 1635 MG/DL (ref 610–1616)
IMM GRANULOCYTES # BLD: 0 10E3/UL
IMM GRANULOCYTES NFR BLD: 0 %
INR PPP: 1.25 (ref 0.85–1.15)
IRON BINDING CAPACITY (ROCHE): NORMAL
IRON SATN MFR SERPL: NORMAL %
IRON SERPL-MCNC: 133 UG/DL (ref 61–157)
LDH SERPL L TO P-CCNC: 246 U/L (ref 0–250)
LYMPHOCYTES # BLD AUTO: 0.9 10E3/UL (ref 0.8–5.3)
LYMPHOCYTES NFR BLD AUTO: 29 %
MCH RBC QN AUTO: 35.7 PG (ref 26.5–33)
MCHC RBC AUTO-ENTMCNC: 36.6 G/DL (ref 31.5–36.5)
MCV RBC AUTO: 98 FL (ref 78–100)
MONOCYTES # BLD AUTO: 0.3 10E3/UL (ref 0–1.3)
MONOCYTES NFR BLD AUTO: 9 %
NEUTROPHILS # BLD AUTO: 1.8 10E3/UL (ref 1.6–8.3)
NEUTROPHILS NFR BLD AUTO: 58 %
NRBC # BLD AUTO: 0 10E3/UL
NRBC BLD AUTO-RTO: 0 /100
PLATELET # BLD AUTO: 54 10E3/UL (ref 150–450)
POTASSIUM SERPL-SCNC: 4.2 MMOL/L (ref 3.4–5.3)
PROT SERPL-MCNC: 7.2 G/DL (ref 6.4–8.3)
RBC # BLD AUTO: 4.17 10E6/UL (ref 4.4–5.9)
RETICS # AUTO: 0.07 10E6/UL (ref 0.03–0.1)
RETICS/RBC NFR AUTO: 1.7 % (ref 0.5–2)
SODIUM SERPL-SCNC: 136 MMOL/L (ref 135–145)
TOTAL PROTEIN SERUM FOR ELP: 6.9 G/DL (ref 6.4–8.3)
WBC # BLD AUTO: 3.1 10E3/UL (ref 4–11)

## 2024-01-30 PROCEDURE — 83540 ASSAY OF IRON: CPT | Performed by: INTERNAL MEDICINE

## 2024-01-30 PROCEDURE — 99205 OFFICE O/P NEW HI 60 MIN: CPT | Performed by: INTERNAL MEDICINE

## 2024-01-30 PROCEDURE — 84165 PROTEIN E-PHORESIS SERUM: CPT | Mod: 26 | Performed by: PATHOLOGY

## 2024-01-30 PROCEDURE — 80053 COMPREHEN METABOLIC PANEL: CPT | Performed by: INTERNAL MEDICINE

## 2024-01-30 PROCEDURE — 85652 RBC SED RATE AUTOMATED: CPT | Performed by: INTERNAL MEDICINE

## 2024-01-30 PROCEDURE — 36415 COLL VENOUS BLD VENIPUNCTURE: CPT | Performed by: INTERNAL MEDICINE

## 2024-01-30 PROCEDURE — 83825 ASSAY OF MERCURY: CPT | Performed by: INTERNAL MEDICINE

## 2024-01-30 PROCEDURE — 82728 ASSAY OF FERRITIN: CPT | Performed by: INTERNAL MEDICINE

## 2024-01-30 PROCEDURE — 83550 IRON BINDING TEST: CPT | Performed by: INTERNAL MEDICINE

## 2024-01-30 PROCEDURE — 86140 C-REACTIVE PROTEIN: CPT | Performed by: INTERNAL MEDICINE

## 2024-01-30 PROCEDURE — 83615 LACTATE (LD) (LDH) ENZYME: CPT | Performed by: INTERNAL MEDICINE

## 2024-01-30 PROCEDURE — 85025 COMPLETE CBC W/AUTO DIFF WBC: CPT | Performed by: INTERNAL MEDICINE

## 2024-01-30 PROCEDURE — 84155 ASSAY OF PROTEIN SERUM: CPT | Mod: 91 | Performed by: INTERNAL MEDICINE

## 2024-01-30 PROCEDURE — 82784 ASSAY IGA/IGD/IGG/IGM EACH: CPT | Performed by: INTERNAL MEDICINE

## 2024-01-30 PROCEDURE — 85610 PROTHROMBIN TIME: CPT | Performed by: INTERNAL MEDICINE

## 2024-01-30 PROCEDURE — G0463 HOSPITAL OUTPT CLINIC VISIT: HCPCS | Performed by: INTERNAL MEDICINE

## 2024-01-30 PROCEDURE — 99207 BLOOD MORPHOLOGY PATHOLOGIST REVIEW: CPT | Performed by: STUDENT IN AN ORGANIZED HEALTH CARE EDUCATION/TRAINING PROGRAM

## 2024-01-30 PROCEDURE — 85045 AUTOMATED RETICULOCYTE COUNT: CPT | Performed by: INTERNAL MEDICINE

## 2024-01-30 PROCEDURE — 84165 PROTEIN E-PHORESIS SERUM: CPT | Mod: TC | Performed by: PATHOLOGY

## 2024-01-30 PROCEDURE — 82175 ASSAY OF ARSENIC: CPT | Performed by: INTERNAL MEDICINE

## 2024-01-30 ASSESSMENT — PAIN SCALES - GENERAL: PAINLEVEL: NO PAIN (0)

## 2024-01-30 NOTE — TELEPHONE ENCOUNTER
RECORDS STATUS - ALL OTHER DIAGNOSIS      RECORDS RECEIVED FROM: [facility] - Internal records   DATE RECEIVED: 1/29

## 2024-01-30 NOTE — NURSING NOTE
Chief Complaint   Patient presents with    Oncology Clinic Visit     Iron overload    Blood Draw     Labs drawn via  by RN in lab. VS taken.      Labs collected from venipuncture by RN. Vitals taken. Checked in for appointment(s).    Shaneka Dao RN

## 2024-01-30 NOTE — PROGRESS NOTES
Hematology/Oncology Consult Note    Demetrius Burton MRN# [id number]   Age: 58 year old YOB: 1965          Reason for Consult:     Iron overload        Assessment and Plan:   Demetrius Burton is a 58 year old     Problem list:   #Liver Cirrhosis  # Thrombocytopenia  # Iron Overload, hemosiderosis of hepatocyte  # HFE H63D Heterozygous  #Alpha 1 antitrypsin S heterozygosity  # Hearing loss, family hx  #Type  2DM  # Peripheral neuropathy  #COVID infection x 2  # Weight loss  This is a very interesting gentleman with cirrhosis with excess iron in hepatocytes without a very clear etiology.  He has never been a heavy drinker,he does have an H63D HFE, mutation as well as an alpha 1 antitrypsin S mutation (could these 2 conspire to enhance cirrhosis risk?), he has been a heavy meat eater especially venison and has no other hepatotoxins exposure.  In talking with Dr. Lambert he had abnormal liver functions in 2006 suggesting that this process has been going on for several years.  He has this unusual hyperesthesia of his feet and toes in which he flinches with just a slight touch of a tongue blade to his toes and feet suggesting a peripheral neuropathy.  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  Could he have a mutation and other hemochromatosis genes such as hemojuvelin, transferrin 2 receptors, hepcidin, or ferroportin?  I am wondering if he should see a genetic counselor given he already has the H63D HFE as well as the alpha 1 antitrypsin gene and this unusual history of hearing loss in his father, brother and himself at young ages 20.  He does have thrombocytopenia and we will need to not use aspirin,  He does have a mildly big spleen to account for the thrombocytopenia and liver disease but I would be hesitant to do a bone marrow in this patient with obvious cirrhosis at this time.  I  discussed with Dr. Lambert whether hearing loss and liver disease ever goes together like hearing loss and kidney disease and Alport syndrome.  I would also wonder about COVID.  He had serious infection with COVID and I am wondering whether or not an altered immune response had enhanced the process in his liver.  He did not obviously have a fatty liver but he does have diabetes which makes me wonder about a CUNNINGHAM-like metabolic syndrome and fatty liver.  Certainly cirrhosis in and of itself can cause iron accumulation in hepatocytes.  In my experience patients with H63D do not usually have cirrhosis but these individuals can have increased iron absorption.  I think he probably should see a neurologist for the neuropathy and again have good diabetes control including examination by an ophthalmologist.    Summary of Recommendations:  Genetic counseling and consideration of testing for hemojuvelin, ferroportin, hepcidin,and transferrin 2 mutations  Consider phlebotomy to remove iron which may decrease inflammation in his liver. This can be done at [place]  Heavy metals assessment in the urine  Neurology consultation for peripheral neuropathy  Good diabetes control, Ophtho exam  Vitamin D 200 units a day as an antioxidant    Thank you for involving us in the care of this patient.   I spent a total of 60 minutes face to face with Demetrius Burton during today's office visit. Over 50% of this time was spent counseling the patient and coordinating the care regarding iron overload and cirrhoisis and 30minutes preparing to see the patient and  care coordination     Kishor Andrea MD  [phone]          History of Present Illness:   History obtained from chart review and confirmed with patient.    Demetrius Burton is a 58 year old who was in good health until earlier this year when he was diagnosed with diabetes in the setting of a 30 pound unintentional weight loss.  He was started on metformin and jardiance and his  hemoglobin A1c dropped from 9.5% to 5.9%.  The jardiance was discontinued. He also has noticed some neuropathy in his feet with pain to touch in his toes and top of his feet. He has had COVID-19 twice in the last couple of years.  His first diagnosis was in February 2022  which lasted  a month treated with antibiotics and steroids.  He then got COVID-19 again in September 2023.    He denies any history of jaundice, abdominal swelling, lower extremity swelling, slowness of thought or confusion.  Denies any diarrhea or constipation, melena or hematochezia.  He denies any nausea or vomiting in the setting of his unintentional weight loss.He denies any known family history of liver disease.  He has several family members with diabetes.  He denies any over-the-counter medications or supplements except for a multivitamin.  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 Throughout his life he has not had issues with alcohol overuse; he drinks about 1 alcohol-containing beverage per month.He was seen by Dr Lambert in Hepatology. CRISTINA , anti actin and hepatitis serologies were negative. He did have an [id number] heterozygosity and an alpha 1 anti trypsin S heterozygosity. No tylenol abuse.           Review of Systems:   A comprehensive ROS was performed with the patient and was found to be negative with the exception of that noted in the HPI above.  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  PSYCH Mood ok  SKIN He sunburns easily no skin cancer  ID Had Covid in 2022   with pneumonia with difficulty breathing on antibiotics and steroids lasted a month Had COVID in Oct 2023 lasted a few days no paxlovid  HEME No anemia, no DVT Eats meat was eating venison, not using iron pans less Vit C No blood transfusions No bleeding or petechiae He has had nose bleeding   No UTI occ nocturia         Past Medical History:     Past Medical History:   Diagnosis Date    Diabetes mellitus, type 2 (H)     Hyperlipidemia             Past Surgical History:     Past Surgical History:   Procedure Laterality Date    APPENDECTOMY      COLONOSCOPY N/A 05/21/2021    Procedure: COLONOSCOPY;  Surgeon: Hipolito Alexander MD;  [facility]    IR LIVER BIOPSY PERCUTANEOUS  1/5/2024    PERCUTANEOUS BIOPSY LIVER N/A 1/5/2024    Procedure: Percutaneous biopsy liver;  Surgeon: Alvarez Alexander MD;  Location: [facility] OR   Had small bowel resection with appendectomy 25 years ago         Social History:   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 No   Social History Narrative    Not on file     Social Determinants of Health     Financial Resource Strain: Not on file   Food Insecurity: Not on file   Transportation Needs: Not on file   Physical Activity: Not on file   Stress: Not on file   Social Connections: Not on file   Interpersonal  Safety: Not on file   Housing Stability: Not on file            Family History:   [place] ancestry  Family History   Problem Relation Age of Onset    Diabetes Mother     Heart Disease Mother         stent placement.    Morbid Obesity Mother         bypass    Valvular heart disease Father     Diabetes Brother     Liver Disease No family hx of    Has 2 children 29 Fabricio and Ric 27 both healthy  Younger brother has DM and skin cancer  No hx of iron overload  Father had nerve deafness and older brother had hearing aids in his 20s. No kidney issues       Allergies:   No Known Allergies         Medications:   (Not in a hospital admission)           Physical Exam:   There were no vitals taken for this visit.  There were no vitals filed for this visit.  General: Appears well, in no acute distress.  Heme/Lymph: No overt bleeding. No cervical, axillary, or supraclavicular adenopathy.  Skin: No concerning lesions, rash, jaundice, cyanosis, erythema, or ecchymoses on exposed surfaces.No petechia Prominent line in nails and ? Clubbing See pix     Media Information    Document Information    Other: Photograph      01/30/2024 10:06 AM   Attached To:   Oncology Visit on 1/30/24 with Kishor Andrea MD   Source Information    Kishor Andrea MD   Bmt     HEENT: NCAT. EOMI, anicteric sclera. Oral mucosa pink and moist with no lesions or thrush.Wears hearing aid  Respiratory: Non-labored breathing, good air exchange, lungs clear to auscultation bilaterally.  Cardiovascular: Regular rate and rhythm. No murmur or rub.   Gastrointestinal: Normoactive bowel sounds. Abdomen soft, non-distended, and non-tender. Spleen tip palpable  Extremities: No extremity edema.   Neurologic: A&O x 3, speech normal, Dtrs in knee and ankle decreased Increased pinprick sensitivity hyperesthesia pulling away from pin on both feet and toes up to ankle Normal Pinprick in hands        Data:   I have personally reviewed the following  labs/imaging:  CBC@LABRCNTIPR(wbc:4,rbc:4,hgb:4,hct:4,mcv:4,mch:4,mchc:4,rdw:4,plt:4)@  CMP@LABRCNTIPR(na:4,potassium:4,chloride:4,co2:4,aniongap:4,g,bun:4,cr:4,gfrestimated:4,gfrestblack:4,hiram:4,ma,phos:4,prottotal:4,albumin:4,bilitotal:4,alkphos:4,ast:4,alt:4)@  INR@LABRCNTIPR(inr:4)@   Advanced cirrhosis, inactive (Laennec fibrosis stage 4C):   -With hemosiderosis, (3-4+ iron on a scale of 0-4)   -Etiology uncertain but see comment      Electronically signed by Alisha Rouse MD on 2024 at 11:18 AM   Comment  [id number]   The sample size is adequate and shows benign liver parenchyma with severe fibrosis seen with the trichrome and reticulin stains. There is no significant lobular inflammation, the lymphocytes seen being limited to fibrous septa, where associated reactive ductules are also seen. Fibrosis is advanced, appreciable on routine H&E stain and supported by the trichrome and reticulin stains. There are nodules surrounded by broad fibrous septae, but the extent of fibrosis is such that it diffusely disrupts the majority of the sample surface area. Iron stain shows patchy but marked increased iron deposition within hepatocytes. PAS and PAS-D stains show no abnormal cytoplasmic accumulations, and bile ducts appear generally normal with no evidence of an underlying chronic biliary disease.     The cause of cirrhosis is unclear, although given risk factors for fatty liver disease (hyperlipidemia and type 2 diabetes mellitus), one would have to consider steatohepatitis, now burnt out with cirrhosis development as the top candidate. The degree of observed iron could be due to advanced fibrosis. However, given elevated ferritin that does not match ALT/AST levels, the possibility of an underlying primary (i.e. genetic hemochromatosis) or secondary iron metabolic disorder having contributed to cirrhosis development may need to be investigated.

## 2024-01-30 NOTE — NURSING NOTE
"Oncology Rooming Note    January 30, 2024 9:18 AM   Demetrius Burton is a 58 year old male who presents for:    Chief Complaint   Patient presents with    Oncology Clinic Visit     Iron overload    Blood Draw     Labs drawn via  by RN in lab. VS taken.      Initial Vitals: /69 (BP Location: Right arm, Patient Position: Sitting, Cuff Size: Adult Large)   Pulse 72   Temp 98.2  F (36.8  C) (Oral)   Resp 16   Wt 87.1 kg (192 lb 1.6 oz)   SpO2 99%   BMI 24.66 kg/m   Estimated body mass index is 24.66 kg/m  as calculated from the following:    Height as of 1/5/24: 1.88 m (6' 2\").    Weight as of this encounter: 87.1 kg (192 lb 1.6 oz). Body surface area is 2.13 meters squared.  No Pain (0) Comment: Data Unavailable   No LMP for male patient.  Allergies reviewed: Yes  Medications reviewed: Yes    Medications: Medication refills not needed today.  Pharmacy [facility] entered into [facility]: [id number] [place] [place] [facility] [address] [facility]    Frailty Screening:   Is the patient here for a new oncology consult visit in cancer care? 2. No      Clinical concerns: none       Yulisa Alfred              "

## 2024-01-30 NOTE — LETTER
1/30/2024         RE: Demetrius Burton  [address]  [facility] [id number]        Dear Colleague,    Thank you for referring your patient, Demetrius Burton, to the [facility] [place]. Please see a copy of my visit note below.       Hematology/Oncology Consult Note    Demetrius Burton MRN# [id number]   Age: 58 year old YOB: 1965          Reason for Consult:     Iron overload        Assessment and Plan:   Demetrius Burton is a 58 year old     Problem list:   #Liver Cirrhosis  # Thrombocytopenia  # Iron Overload, hemosiderosis of hepatocyte  # HFE H63D Heterozygous  #Alpha 1 antitrypsin S heterozygosity  # Hearing loss, family hx  #Type  2DM  # Peripheral neuropathy  #COVID infection x 2  # Weight loss  This is a very interesting gentleman with cirrhosis with excess iron in hepatocytes without a very clear etiology.  He has never been a heavy drinker,he does have an H63D HFE, mutation as well as an alpha 1 antitrypsin S mutation (could these 2 conspire to enhance cirrhosis risk?), he has been a heavy meat eater especially venison and has no other hepatotoxins exposure.  In talking with Dr. Lambert he had abnormal liver functions in 2006 suggesting that this process has been going on for several years.  He has this unusual hyperesthesia of his feet and toes in which he flinches with just a slight touch of a tongue blade to his toes and feet suggesting a peripheral neuropathy.  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  Could he have a mutation and other hemochromatosis genes such as hemojuvelin, transferrin 2 receptors, hepcidin, or ferroportin?  I am wondering if he should see a genetic counselor given he already has the H63D HFE as well as the alpha 1 antitrypsin gene and this unusual history of hearing loss in his father, brother  and himself at young ages 20.  He does have thrombocytopenia and we will need to not use aspirin,  He does have a mildly big spleen to account for the thrombocytopenia and liver disease but I would be hesitant to do a bone marrow in this patient with obvious cirrhosis at this time.  I discussed with Dr. Lambert whether hearing loss and liver disease ever goes together like hearing loss and kidney disease and Alport syndrome.  I would also wonder about COVID.  He had serious infection with COVID and I am wondering whether or not an altered immune response had enhanced the process in his liver.  He did not obviously have a fatty liver but he does have diabetes which makes me wonder about a CUNNINGHAM-like metabolic syndrome and fatty liver.  Certainly cirrhosis in and of itself can cause iron accumulation in hepatocytes.  In my experience patients with H63D do not usually have cirrhosis but these individuals can have increased iron absorption.  I think he probably should see a neurologist for the neuropathy and again have good diabetes control including examination by an ophthalmologist.    Summary of Recommendations:  Genetic counseling and consideration of testing for hemojuvelin, ferroportin, hepcidin,and transferrin 2 mutations  Consider phlebotomy to remove iron which may decrease inflammation in his liver. This can be done at [place]  Heavy metals assessment in the urine  Neurology consultation for peripheral neuropathy  Good diabetes control, Ophtho exam  Vitamin D 200 units a day as an antioxidant    Thank you for involving us in the care of this patient.   I spent a total of 60 minutes face to face with Demetrius Burton during today's office visit. Over 50% of this time was spent counseling the patient and coordinating the care regarding iron overload and cirrhoisis and 30minutes preparing to see the patient and  care coordination     Kishor Andrea MD  [phone]          History of Present Illness:   History  obtained from chart review and confirmed with patient.    Demetrius Burton is a 58 year old who was in good health until earlier this year when he was diagnosed with diabetes in the setting of a 30 pound unintentional weight loss.  He was started on metformin and jardiance and his hemoglobin A1c dropped from 9.5% to 5.9%.  The jardiance was discontinued. He also has noticed some neuropathy in his feet with pain to touch in his toes and top of his feet. He has had COVID-19 twice in the last couple of years.  His first diagnosis was in February 2022  which lasted  a month treated with antibiotics and steroids.  He then got COVID-19 again in September 2023.    He denies any history of jaundice, abdominal swelling, lower extremity swelling, slowness of thought or confusion.  Denies any diarrhea or constipation, melena or hematochezia.  He denies any nausea or vomiting in the setting of his unintentional weight loss.He denies any known family history of liver disease.  He has several family members with diabetes.  He denies any over-the-counter medications or supplements except for a multivitamin.  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 Throughout his life he has not had issues with alcohol overuse; he drinks about 1 alcohol-containing beverage per month.He was seen by Dr Lambert in Hepatology. CRISTINA , anti actin and hepatitis serologies were negative. He did have an [id number] heterozygosity and an alpha 1 anti trypsin S heterozygosity. No tylenol abuse.           Review of Systems:   A comprehensive ROS was performed with the patient and was found to be negative with the exception of that noted in the HPI above.  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  PSYCH Mood ok  SKIN He sunburns easily no skin cancer  ID Had Covid in 2022   with pneumonia with difficulty breathing on antibiotics and steroids lasted a month Had COVID in Oct 2023 lasted a few days no paxlovid  HEME No anemia, no DVT Eats meat was eating venison, not using iron pans less Vit C No blood transfusions No bleeding or petechiae He has had nose bleeding   No UTI occ nocturia         Past Medical History:     Past Medical History:   Diagnosis Date    Diabetes mellitus, type 2 (H)     Hyperlipidemia             Past Surgical History:     Past Surgical History:   Procedure Laterality Date    APPENDECTOMY      COLONOSCOPY N/A 05/21/2021    Procedure: COLONOSCOPY;  Surgeon: Hipolito Alexander MD;  Location: [facility]    IR LIVER BIOPSY PERCUTANEOUS  1/5/2024    PERCUTANEOUS BIOPSY LIVER N/A 1/5/2024    Procedure: Percutaneous biopsy liver;  Surgeon: Alvarez Alexander MD;  Location: [facility] OR   Had small bowel resection with appendectomy 25 years ago         Social History:   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 His wife Francheska also works for [employer]  Social History     Socioeconomic History    Marital status:      Spouse name: Not on file    Number of children: Not on file    Years of education: Not on file    Highest education level: Not on file   Occupational History    Not on file   Tobacco Use    Smoking status: Never    Smokeless tobacco: Never   Vaping Use    Vaping Use: Never used   Substance and Sexual Activity    Alcohol use: Yes     Comment: rare    Drug use: No    Sexual activity: Not on file   Other Topics Concern    Parent/sibling w/ CABG, MI or angioplasty before 65F 55M? No   Social  History Narrative    Not on file     Social Determinants of Health     Financial Resource Strain: Not on file   Food Insecurity: Not on file   Transportation Needs: Not on file   Physical Activity: Not on file   Stress: Not on file   Social Connections: Not on file   Interpersonal Safety: Not on file   Housing Stability: Not on file            Family History:   [place] ancestry  Family History   Problem Relation Age of Onset    Diabetes Mother     Heart Disease Mother         stent placement.    Morbid Obesity Mother         bypass    Valvular heart disease Father     Diabetes Brother     Liver Disease No family hx of    Has 2 children 29 Fabricio and Ric 27 both healthy  Younger brother has DM and skin cancer  No hx of iron overload  Father had nerve deafness and older brother had hearing aids in his 20s. No kidney issues       Allergies:   No Known Allergies         Medications:   (Not in a hospital admission)           Physical Exam:   There were no vitals taken for this visit.  There were no vitals filed for this visit.  General: Appears well, in no acute distress.  Heme/Lymph: No overt bleeding. No cervical, axillary, or supraclavicular adenopathy.  Skin: No concerning lesions, rash, jaundice, cyanosis, erythema, or ecchymoses on exposed surfaces.No petechia Prominent line in nails and ? Clubbing See pix     Media Information    Document Information    Other: Photograph      01/30/2024 10:06 AM   Attached To:   Oncology Visit on 1/30/24 with Kishor Andrea MD   Source Information    Kishor Andrea MD  [facility]     HEENT: NCAT. EOMI, anicteric sclera. Oral mucosa pink and moist with no lesions or thrush.Wears hearing aid  Respiratory: Non-labored breathing, good air exchange, lungs clear to auscultation bilaterally.  Cardiovascular: Regular rate and rhythm. No murmur or rub.   Gastrointestinal: Normoactive bowel sounds. Abdomen soft, non-distended, and non-tender. Spleen tip  palpable  Extremities: No extremity edema.   Neurologic: A&O x 3, speech normal, Dtrs in knee and ankle decreased Increased pinprick sensitivity hyperesthesia pulling away from pin on both feet and toes up to ankle Normal Pinprick in hands        Data:   I have personally reviewed the following labs/imaging:  CBC@LABRCNTIPR(wbc:4,rbc:4,hgb:4,hct:4,mcv:4,mch:4,mchc:4,rdw:4,plt:4)@  CMP@LABRCNTIPR(na:4,potassium:4,chloride:4,co2:4,aniongap:4,g,bun:4,cr:4,gfrestimated:4,gfrestblack:4,hiram:4,ma,phos:4,prottotal:4,albumin:4,bilitotal:4,alkphos:4,ast:4,alt:4)@  INR@LABRCNTIPR(inr:4)@   Advanced cirrhosis, inactive (Laennec fibrosis stage 4C):   -With hemosiderosis, (3-4+ iron on a scale of 0-4)   -Etiology uncertain but see comment      Electronically signed by Alisha Rouse MD on 2024 at 11:18 AM   Comment  UUMADA   The sample size is adequate and shows benign liver parenchyma with severe fibrosis seen with the trichrome and reticulin stains. There is no significant lobular inflammation, the lymphocytes seen being limited to fibrous septa, where associated reactive ductules are also seen. Fibrosis is advanced, appreciable on routine H&E stain and supported by the trichrome and reticulin stains. There are nodules surrounded by broad fibrous septae, but the extent of fibrosis is such that it diffusely disrupts the majority of the sample surface area. Iron stain shows patchy but marked increased iron deposition within hepatocytes. PAS and PAS-D stains show no abnormal cytoplasmic accumulations, and bile ducts appear generally normal with no evidence of an underlying chronic biliary disease.     The cause of cirrhosis is unclear, although given risk factors for fatty liver disease (hyperlipidemia and type 2 diabetes mellitus), one would have to consider steatohepatitis, now burnt out with cirrhosis development as the top candidate. The degree of observed iron could be due to advanced fibrosis. However, given  elevated ferritin that does not match ALT/AST levels, the possibility of an underlying primary (i.e. genetic hemochromatosis) or secondary iron metabolic disorder having contributed to cirrhosis development may need to be investigated.          Kishor Andrea MD

## 2024-01-30 NOTE — PROGRESS NOTES
[facility]: Cancer Care                                                                                          Very briefly met pt, Demetrius, and his wife, Francheska, today, prior to their visit with Dr. Billy Andrea.  Did give them a copy of RN business card.  RN unable to get back into room before pt/wife left office after visit with Dr. Billy Andrea..    Signature:  Shruthi Becker RN, OCN

## 2024-01-31 LAB
ALBUMIN SERPL ELPH-MCNC: 3.9 G/DL (ref 3.7–5.1)
ALPHA1 GLOB SERPL ELPH-MCNC: 0.2 G/DL (ref 0.2–0.4)
ALPHA2 GLOB SERPL ELPH-MCNC: 0.5 G/DL (ref 0.5–0.9)
B-GLOBULIN SERPL ELPH-MCNC: 0.7 G/DL (ref 0.6–1)
GAMMA GLOB SERPL ELPH-MCNC: 1.7 G/DL (ref 0.7–1.6)
LOCATION OF TASK: ABNORMAL
M PROTEIN SERPL ELPH-MCNC: 0 G/DL
PATH REPORT.COMMENTS IMP SPEC: NORMAL
PATH REPORT.FINAL DX SPEC: NORMAL
PATH REPORT.MICROSCOPIC SPEC OTHER STN: NORMAL
PATH REPORT.MICROSCOPIC SPEC OTHER STN: NORMAL
PATH REPORT.RELEVANT HX SPEC: NORMAL
PROT PATTERN SERPL ELPH-IMP: ABNORMAL

## 2024-02-04 LAB
ARSENIC 24H UR-MRATE: ABNORMAL UG/D
ARSENIC UR-MCNC: 47.6 UG/L
ARSENIC/CREAT UR: 38.7 UG/G CRT
CREAT 24H UR-MRATE: ABNORMAL MG/D
CREAT UR-MCNC: 123 MG/DL
LEAD 24H UR-MRATE: ABNORMAL UG/D
LEAD UR-MCNC: <5 UG/L
LEAD/CREAT UR: ABNORMAL UG/G CRT
MERCURY 24H UR-MRATE: ABNORMAL UG/D
MERCURY UR-MCNC: <2.5 UG/L
MERCURY/CREAT UR: ABNORMAL UG/G CRT

## 2024-02-07 LAB
ARSENIC ORGANIC UR-MCNC: 31.6 UG/L
ARSENIC.METHYLATED UR-MCNC: <10 UG/L
INORG ARSENIC UR-MCNC: <10 UG/L
OBSERVATION IMP: NORMAL

## 2024-02-23 DIAGNOSIS — K74.60 HEPATIC CIRRHOSIS, UNSPECIFIED HEPATIC CIRRHOSIS TYPE, UNSPECIFIED WHETHER ASCITES PRESENT (H): ICD-10-CM

## 2024-02-23 DIAGNOSIS — E83.19 IRON OVERLOAD: Primary | ICD-10-CM

## 2024-02-23 DIAGNOSIS — E11.40 TYPE 2 DIABETES MELLITUS WITH DIABETIC NEUROPATHY, WITHOUT LONG-TERM CURRENT USE OF INSULIN (H): ICD-10-CM

## 2024-03-01 DIAGNOSIS — E11.40 TYPE 2 DIABETES MELLITUS WITH DIABETIC NEUROPATHY, WITHOUT LONG-TERM CURRENT USE OF INSULIN (H): ICD-10-CM

## 2024-03-01 DIAGNOSIS — K74.60 HEPATIC CIRRHOSIS, UNSPECIFIED HEPATIC CIRRHOSIS TYPE, UNSPECIFIED WHETHER ASCITES PRESENT (H): ICD-10-CM

## 2024-03-01 DIAGNOSIS — E83.19 IRON OVERLOAD: Primary | ICD-10-CM

## 2024-03-01 NOTE — PROGRESS NOTES
Virtual Visit Details    Type of service:  Video Visit     Originating Location (pt. Location): home    Distant Location (provider location):  On-site  Platform used for Video Visit: [facility]     Hematology/Oncology Consult Note    Demetrius Burton MRN# [id number]   Age: 58 year old YOB: 1965          Reason for Consult:     Iron overload        Assessment and Plan:   Demetrius Burton is a 58 year old     Problem list:   #Liver Cirrhosis  # Thrombocytopenia  # Iron Overload, hemosiderosis of hepatocyte  # HFE H63D Heterozygous  #Alpha 1 antitrypsin S heterozygosity  # Hearing loss, family hx  #Type  2DM  # Peripheral neuropathy  #COVID infection x 2  # Weight loss  # Elevated organic Arsenic  This is a very interesting gentleman with cirrhosis with excess iron in hepatocytes without a very clear etiology.  He has never been a heavy drinker,he does have an H63D HFE, mutation as well as an alpha 1 antitrypsin S mutation (could these 2 conspire to enhance cirrhosis risk?), cooks in an iron [employer] and he has been a heavy meat eater especially venison and has no other hepatotoxins exposure.  In talking with Dr. Lambert he had abnormal liver functions in 2006 suggesting that this process has been going on for several years.  He has this unusual hyperesthesia of his feet and toes  which is improving.  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 He could check arsenic in the well water at work. I do not think arsenic is playing a role in his liver disease,  Could he have a mutation and other hemochromatosis genes such as hemojuvelin, transferrin 2 receptors, hepcidin, or ferroportin?  With the hearing loss and fam hx  I suggested he see a genetic counselor. Possibly they could test for these other iron genes at this May appointment.   He does have thrombocytopenia likely related to  the liver disease and he should not use aspirin,  He does have a mildly big spleen to account for the thrombocytopenia and liver disease but I would be hesitant to do a bone marrow in this patient with obvious cirrhosis at this time.    I would also wonder about COVID.  He had serious infection with COVID and I am wondering whether or not an altered immune response had enhanced the process in his liver.  He did not obviously have a fatty liver but he does have diabetes which makes me wonder about a CUNNINGHAM-like metabolic syndrome and fatty liver.  Certainly cirrhosis in and of itself can cause iron accumulation in hepatocytes.  In my experience patients with H63D do not usually have cirrhosis but these individuals can have increased iron absorption.  I think he probably could see a neurologist for the neuropathy if it worsens and again have good diabetes control including examination by an ophthalmologist.  I would like to decrease iron by phlebotomy Will plan to do phlebotomies every 2 weeks in [facility] to reduce total body iron and see if this helps his liver  Would aim to get johnna to 100-200 range Will check ferritin and cbc each vit to [place] told him this will reduce A1C by removing old rbcs  but lower A1C does not mean better glucose control    Summary of Recommendations:  Genetic counseling and consideration of testing for hemojuvelin, ferroportin, hepcidin,and transferrin 2 mutations  Phlebotomy  every 2 weeks for hematocrit>33% at [place] to remove iron which may decrease inflammation in his liver.   Avoid ASA  Test well water for arsenic  Good diabetes control, Ophtho exam  Vitamin E 200 units a day as an antioxidant  RTC in 4 months    Thank you for involving us in the care of this patient.   I spent a total of 30 minutes face to face with Demetrius Burton during today's office visit. Over 50% of this time was spent counseling the patient and coordinating the care regarding iron overload and  cirrhoisis and 10minutes preparing to see the patient and  care coordination     Kishor Andrea MD  [phone]          History of Present Illness:   History obtained from chart review and confirmed with patient.    Demetrius Burton is a 58 year old who was in good health until earlier this year when he was diagnosed with diabetes in the setting of a 30 pound unintentional weight loss.  He was started on metformin and jardiance and his hemoglobin A1c dropped from 9.5% to 5.9%.  The jardiance was discontinued. He also has noticed some neuropathy in his feet with pain to touch in his toes and top of his feet. He has had COVID-19 twice in the last couple of years.  His first diagnosis was in February 2022  which lasted  a month treated with antibiotics and steroids.  He then got COVID-19 again in September 2023.    He denies any history of jaundice, abdominal swelling, lower extremity swelling, slowness of thought or confusion.  Denies any diarrhea or constipation, melena or hematochezia.  He denies any nausea or vomiting in the setting of his unintentional weight loss.He denies any known family history of liver disease.  He has several family members with diabetes.  He denies any over-the-counter medications or supplements except for a multivitamin.  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 Throughout his life he has not had issues with alcohol overuse; he drinks about 1 alcohol-containing beverage per month.He was seen by Dr Lambert in Hepatology. CRISTINA , anti actin and hepatitis serologies were negative. He did have an [id number] heterozygosity and an alpha 1 anti trypsin S heterozygosity. No tylenol abuse.    INTERVAL HISTORY.   Less pain in feet and toes.He is taking Vit E 200u/d and not cooking with iron skillets. He note that his water in the home is city water and at work it is well water.Diabetes control with metformin       Review of Systems:   A comprehensive ROS was performed  with the patient and was found to be negative with the exception of that noted in the HPI above.  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  PSYCH Mood ok  SKIN He sunburns easily no skin cancer  ID Had Covid in 2022   with pneumonia with difficulty breathing on antibiotics and steroids lasted a month Had COVID in Oct 2023 lasted a few days no paxlovid  HEME No anemia, no DVT Eats meat was eating venison, not using iron pans less Vit C No blood transfusions No bleeding or petechiae He has had nose bleeding   No UTI occ nocturia         Past Medical History:     Past Medical History:   Diagnosis Date    Diabetes mellitus, type 2 (H)     Hyperlipidemia             Past Surgical History:     Past Surgical History:   Procedure Laterality Date    APPENDECTOMY      COLONOSCOPY N/A 05/21/2021    Procedure: COLONOSCOPY;  Surgeon: Hipolito Alexander MD;  [facility]    IR LIVER BIOPSY PERCUTANEOUS  1/5/2024    PERCUTANEOUS BIOPSY LIVER N/A 1/5/2024    Procedure: Percutaneous biopsy liver;  Surgeon: Alvarez Alexander MD;  [place] [facility] [place]   Had [place] bowel resection with appendectomy 25 years ago         Social History:   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 No   Social History Narrative    Not on file     Social Determinants of Health     Financial Resource Strain: Not on file   Food Insecurity: Not on file   Transportation Needs: Not on file   Physical Activity: Not on file   Stress: Not on file   Social Connections: Not on file   Interpersonal Safety: Not on file   Housing Stability: Not on file            Family History:   [place] ancestry  Family History   Problem Relation Age of Onset    Diabetes Mother     Heart Disease Mother         stent placement.    Morbid Obesity Mother         bypass    Valvular heart disease Father     Diabetes Brother     Liver Disease No family hx of    Has 2 children 29 Fabricio and Ric 27 both healthy  Younger brother has DM and skin cancer  No hx of iron overload  Father had nerve deafness and older brother had hearing aids in his 20s. No kidney issues       Allergies:   No Known Allergies         Medications:   (Not in a hospital admission)      PHYSICAL EXAMINATION:    By the video, he is an alert gentleman, verbal, in no acute distress.     EYES:  Grossly normal to inspection, no discharge, erythema or icterus.   SKIN:  Visible skin is clear.  No significant rash or abnormal pigmentation.   NEUROLOGIC:  Cranial nerves seem to be intact.  Mentation and speech is appropriate for age.   PSYCHIATRIC:  Mentation appears normal.  He does not seem anxious today.            Data:   I have personally reviewed the following  labs/imaging:  CBC@LABRCNTIPR(wbc:4,rbc:4,hgb:4,hct:4,mcv:4,mch:4,mchc:4,rdw:4,plt:4)@  CMP@LABRCNTIPR(na:4,potassium:4,chloride:4,co2:4,aniongap:4,g,bun:4,cr:4,gfrestimated:4,gfrestblack:4,hiram:4,ma,phos:4,prottotal:4,albumin:4,bilitotal:4,alkphos:   Latest Reference Range & Units 24 07:36   Sodium 135 - 145 mmol/L 140   Potassium 3.4 - 5.3 mmol/L 4.1   Chloride 98 - 107 mmol/L 108 (H)   Carbon Dioxide (CO2) 22 - 29 mmol/L 25   Urea Nitrogen 6.0 - 20.0 mg/dL 12.9   Creatinine 0.67 - 1.17 mg/dL 0.76   GFR Estimate >60 mL/min/1.73m2 >90   Calcium 8.6 - 10.0 mg/dL 8.9   Anion Gap 7 - 15 mmol/L 7   Albumin 3.5 - 5.2 g/dL 4.0   Protein Total 6.4 - 8.3 g/dL 7.6   Alkaline Phosphatase 40 - 150 U/L 230 (H)   ALT 0 - 70 U/L 73 (H)   AST 0 - 45 U/L 59 (H)   Bilirubin Total <=1.2 mg/dL 1.5 (H)   CRP Inflammation <5.00 mg/L <3.00   Ferritin 31 - 409 ng/mL 959 (H)   Glucose 70 - 99 mg/dL 190 (H)   Iron 61 - 157 ug/dL 151   Iron Binding Capacity 240 - 430 ug/dL 242   Iron Sat Index 15 - 46 % 62 (H)   WBC 4.0 - 11.0 10e3/uL 3.0 (L)   Hemoglobin 13.3 - 17.7 g/dL 15.5   Hematocrit 40.0 - 53.0 % 43.2   Platelet Count 150 - 450 10e3/uL 61 (L)   RBC Count 4.40 - 5.90 10e6/uL 4.33 (L)   MCV 78 - 100 fL 100   MCH 26.5 - 33.0 pg 35.8 (H)   MCHC 31.5 - 36.5 g/dL 35.9   RDW 10.0 - 15.0 % 12.7   % Neutrophils % 57   % Lymphocytes % 28   % Monocytes % 11   % Eosinophils % 3   % Basophils % 1   Absolute Basophils 0.0 - 0.2 10e3/uL 0.0   Absolute Eosinophils 0.0 - 0.7 10e3/uL 0.1   Absolute Immature Granulocytes <=0.4 10e3/uL 0.0   Absolute Lymphocytes 0.0 - 5.3 10e3/uL 0.8   Absolute Monocytes 0.0 - 1.3 10e3/uL 0.3   % Immature Granulocytes % 0   Absolute Neutrophils 1.6 - 8.3 10e3/uL 1.7   Absolute NRBCs 10e3/uL 0.0   NRBCs per 100 WBC <1 /100 0   % Retic 0.5 - 2.0 % 2.4 (H)   Absolute Retic 0.025 - 0.095 10e6/uL 0.103 (H)   (H): Data is abnormally high  (L): Data is abnormally low4,ast:4,alt:4)@  INR@LABRCNTIPR(inr:4)@

## 2024-03-04 ENCOUNTER — LAB (OUTPATIENT)
Dept: LAB | Facility: CLINIC | Age: 59
End: 2024-03-04
Payer: COMMERCIAL

## 2024-03-04 DIAGNOSIS — E11.40 TYPE 2 DIABETES MELLITUS WITH DIABETIC NEUROPATHY, WITHOUT LONG-TERM CURRENT USE OF INSULIN (H): ICD-10-CM

## 2024-03-04 DIAGNOSIS — K74.60 HEPATIC CIRRHOSIS, UNSPECIFIED HEPATIC CIRRHOSIS TYPE, UNSPECIFIED WHETHER ASCITES PRESENT (H): ICD-10-CM

## 2024-03-04 DIAGNOSIS — E83.19 IRON OVERLOAD: ICD-10-CM

## 2024-03-04 LAB
ALBUMIN SERPL BCG-MCNC: 4 G/DL (ref 3.5–5.2)
ALP SERPL-CCNC: 230 U/L (ref 40–150)
ALT SERPL W P-5'-P-CCNC: 73 U/L (ref 0–70)
ANION GAP SERPL CALCULATED.3IONS-SCNC: 7 MMOL/L (ref 7–15)
AST SERPL W P-5'-P-CCNC: 59 U/L (ref 0–45)
BASOPHILS # BLD AUTO: 0 10E3/UL (ref 0–0.2)
BASOPHILS NFR BLD AUTO: 1 %
BILIRUB SERPL-MCNC: 1.5 MG/DL
BUN SERPL-MCNC: 12.9 MG/DL (ref 6–20)
CALCIUM SERPL-MCNC: 8.9 MG/DL (ref 8.6–10)
CHLORIDE SERPL-SCNC: 108 MMOL/L (ref 98–107)
CREAT SERPL-MCNC: 0.76 MG/DL (ref 0.67–1.17)
CRP SERPL-MCNC: <3 MG/L
DEPRECATED HCO3 PLAS-SCNC: 25 MMOL/L (ref 22–29)
EGFRCR SERPLBLD CKD-EPI 2021: >90 ML/MIN/1.73M2
EOSINOPHIL # BLD AUTO: 0.1 10E3/UL (ref 0–0.7)
EOSINOPHIL NFR BLD AUTO: 3 %
ERYTHROCYTE [DISTWIDTH] IN BLOOD BY AUTOMATED COUNT: 12.7 % (ref 10–15)
FERRITIN SERPL-MCNC: 959 NG/ML (ref 31–409)
GLUCOSE SERPL-MCNC: 190 MG/DL (ref 70–99)
HCT VFR BLD AUTO: 43.2 % (ref 40–53)
HGB BLD-MCNC: 15.5 G/DL (ref 13.3–17.7)
IMM GRANULOCYTES # BLD: 0 10E3/UL
IMM GRANULOCYTES NFR BLD: 0 %
IRON BINDING CAPACITY (ROCHE): 242 UG/DL (ref 240–430)
IRON SATN MFR SERPL: 62 % (ref 15–46)
IRON SERPL-MCNC: 151 UG/DL (ref 61–157)
LYMPHOCYTES # BLD AUTO: 0.8 10E3/UL (ref 0–5.3)
LYMPHOCYTES NFR BLD AUTO: 28 %
MCH RBC QN AUTO: 35.8 PG (ref 26.5–33)
MCHC RBC AUTO-ENTMCNC: 35.9 G/DL (ref 31.5–36.5)
MCV RBC AUTO: 100 FL (ref 78–100)
MONOCYTES # BLD AUTO: 0.3 10E3/UL (ref 0–1.3)
MONOCYTES NFR BLD AUTO: 11 %
NEUTROPHILS # BLD AUTO: 1.7 10E3/UL (ref 1.6–8.3)
NEUTROPHILS NFR BLD AUTO: 57 %
NRBC # BLD AUTO: 0 10E3/UL
NRBC BLD AUTO-RTO: 0 /100
PLATELET # BLD AUTO: 61 10E3/UL (ref 150–450)
POTASSIUM SERPL-SCNC: 4.1 MMOL/L (ref 3.4–5.3)
PROT SERPL-MCNC: 7.6 G/DL (ref 6.4–8.3)
RBC # BLD AUTO: 4.33 10E6/UL (ref 4.4–5.9)
RETICS # AUTO: 0.1 10E6/UL (ref 0.03–0.1)
RETICS/RBC NFR AUTO: 2.4 % (ref 0.5–2)
SODIUM SERPL-SCNC: 140 MMOL/L (ref 135–145)
WBC # BLD AUTO: 3 10E3/UL (ref 4–11)

## 2024-03-04 PROCEDURE — 80053 COMPREHEN METABOLIC PANEL: CPT

## 2024-03-04 PROCEDURE — 85045 AUTOMATED RETICULOCYTE COUNT: CPT

## 2024-03-04 PROCEDURE — 82728 ASSAY OF FERRITIN: CPT

## 2024-03-04 PROCEDURE — 36415 COLL VENOUS BLD VENIPUNCTURE: CPT

## 2024-03-04 PROCEDURE — 85025 COMPLETE CBC W/AUTO DIFF WBC: CPT

## 2024-03-04 PROCEDURE — 83540 ASSAY OF IRON: CPT

## 2024-03-04 PROCEDURE — 86140 C-REACTIVE PROTEIN: CPT

## 2024-03-04 PROCEDURE — 83550 IRON BINDING TEST: CPT

## 2024-03-05 ENCOUNTER — VIRTUAL VISIT (OUTPATIENT)
Dept: TRANSPLANT | Facility: CLINIC | Age: 59
End: 2024-03-05
Attending: INTERNAL MEDICINE
Payer: COMMERCIAL

## 2024-03-05 VITALS — WEIGHT: 175 LBS | HEIGHT: 74 IN | BODY MASS INDEX: 22.46 KG/M2

## 2024-03-05 DIAGNOSIS — E83.110 HEREDITARY HEMOCHROMATOSIS (H): Primary | ICD-10-CM

## 2024-03-05 PROCEDURE — 99214 OFFICE O/P EST MOD 30 MIN: CPT | Mod: 95 | Performed by: INTERNAL MEDICINE

## 2024-03-05 RX ORDER — HEPARIN SODIUM,PORCINE 10 UNIT/ML
5-20 VIAL (ML) INTRAVENOUS DAILY PRN
Status: CANCELLED | OUTPATIENT
Start: 2024-03-05

## 2024-03-05 RX ORDER — HEPARIN SODIUM (PORCINE) LOCK FLUSH IV SOLN 100 UNIT/ML 100 UNIT/ML
5 SOLUTION INTRAVENOUS
Status: CANCELLED | OUTPATIENT
Start: 2024-03-05

## 2024-03-05 ASSESSMENT — PAIN SCALES - GENERAL: PAINLEVEL: MILD PAIN (3)

## 2024-03-05 NOTE — LETTER
3/5/2024         RE: Demetrius Burton  [address] [facility] [address]        Dear Colleague,    Thank you for referring your patient, Demetrius Burton, to the [facility] [place]. Please see a copy of my visit note below.    Virtual Visit Details    Type of service:  Video Visit     Originating Location (pt. Location): home    Distant Location (provider location):  On-site  Platform used for Video Visit: [facility]     Hematology/Oncology Consult Note    Demetrius Burton MRN# [id number]   Age: 58 year old YOB: 1965          Reason for Consult:     Iron overload        Assessment and Plan:   Demetrius Burton is a 58 year old     Problem list:   #Liver Cirrhosis  # Thrombocytopenia  # Iron Overload, hemosiderosis of hepatocyte  # HFE H63D Heterozygous  #Alpha 1 antitrypsin S heterozygosity  # Hearing loss, family hx  #Type  2DM  # Peripheral neuropathy  #COVID infection x 2  # Weight loss  # Elevated organic Arsenic  This is a very interesting gentleman with cirrhosis with excess iron in hepatocytes without a very clear etiology.  He has never been a heavy drinker,he does have an H63D HFE, mutation as well as an alpha 1 antitrypsin S mutation (could these 2 conspire to enhance cirrhosis risk?), cooks in an iron [employer] and he has been a heavy meat eater especially venison and has no other hepatotoxins exposure.  In talking with Dr. Lambert he had abnormal liver functions in 2006 suggesting that this process has been going on for several years.  He has this unusual hyperesthesia of his feet and toes  which is improving.  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 He could check arsenic in the well water at work. I do not think arsenic is playing a role in his liver disease,  Could he have a mutation and other  hemochromatosis genes such as hemojuvelin, transferrin 2 receptors, hepcidin, or ferroportin?  With the hearing loss and fam hx  I suggested he see a genetic counselor. Possibly they could test for these other iron genes at this May appointment.   He does have thrombocytopenia likely related to the liver disease and he should not use aspirin,  He does have a mildly big spleen to account for the thrombocytopenia and liver disease but I would be hesitant to do a bone marrow in this patient with obvious cirrhosis at this time.    I would also wonder about COVID.  He had serious infection with COVID and I am wondering whether or not an altered immune response had enhanced the process in his liver.  He did not obviously have a fatty liver but he does have diabetes which makes me wonder about a C[place]AM-like metabolic syndrome and fatty liver.  Certainly cirrhosis in and of itself can cause iron accumulation in hepatocytes.  In my experience patients with H63D do not usually have cirrhosis but these individuals can have increased iron absorption.  I think he probably could see a neurologist for the neuropathy if it worsens and again have good diabetes control including examination by an ophthalmologist.  I would like to decrease iron by phlebotomy Will plan to do phlebotomies every 2 weeks in [facility] to reduce total body iron and see if this helps his liver  Would aim to get johnna to 100-200 range Will check ferritin and cbc each vit to [place] told him this will reduce A1C by removing old rbcs  but lower A1C does not mean better glucose control    Summary of Recommendations:  Genetic counseling and consideration of testing for hemojuvelin, ferroportin, hepcidin,and transferrin 2 mutations  Phlebotomy  every 2 weeks for hematocrit>33% at [place] to remove iron which may decrease inflammation in his liver.   Avoid ASA  Test well water for arsenic  Good diabetes control, Ophtho exam  Vitamin E 200 units a day as an  antioxidant  RTC in 4 months    Thank you for involving us in the care of this patient.   I spent a total of 30 minutes face to face with Demetrius Burton during today's office visit. Over 50% of this time was spent counseling the patient and coordinating the care regarding iron overload and cirrhoisis and 10minutes preparing to see the patient and  care coordination     Kishor Andrea MD  [phone]          History of Present Illness:   History obtained from chart review and confirmed with patient.    Demetrius Burton is a 58 year old who was in good health until earlier this year when he was diagnosed with diabetes in the setting of a 30 pound unintentional weight loss.  He was started on metformin and jardiance and his hemoglobin A1c dropped from 9.5% to 5.9%.  The jardiance was discontinued. He also has noticed some neuropathy in his feet with pain to touch in his toes and top of his feet. He has had COVID-19 twice in the last couple of years.  His first diagnosis was in February 2022  which lasted  a month treated with antibiotics and steroids.  He then got COVID-19 again in September 2023.    He denies any history of jaundice, abdominal swelling, lower extremity swelling, slowness of thought or confusion.  Denies any diarrhea or constipation, melena or hematochezia.  He denies any nausea or vomiting in the setting of his unintentional weight loss.He denies any known family history of liver disease.  He has several family members with diabetes.  He denies any over-the-counter medications or supplements except for a multivitamin.  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 Throughout his life he has not had issues with alcohol overuse; he drinks about 1 alcohol-containing beverage per month.He was seen by Dr Lambert in Hepatology. CRISTINA , anti actin and hepatitis serologies were negative. He did have an [id number] heterozygosity and an alpha 1 anti trypsin S heterozygosity. No tylenol  abuse.    INTERVAL HISTORY.   Less pain in feet and toes.He is taking Vit E 200u/d and not cooking with iron skillets. He note that his water in the home is city water and at work it is well water.Diabetes control with metformin       Review of Systems:   A comprehensive ROS was performed with the patient and was found to be negative with the exception of that noted in the HPI above.  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  PSYCH Mood ok  SKIN He sunburns easily no skin cancer  ID Had Covid in 2022   with pneumonia with difficulty breathing on antibiotics and steroids lasted a month Had COVID in Oct 2023 lasted a few days no paxlovid  HEME No anemia, no DVT Eats meat was eating venison, not using iron pans less Vit C No blood transfusions No bleeding or petechiae He has had nose bleeding   No UTI occ nocturia         Past Medical History:     Past Medical History:   Diagnosis Date    Diabetes mellitus, type 2 (H)     Hyperlipidemia             Past Surgical History:     Past Surgical History:   Procedure Laterality Date    APPENDECTOMY      COLONOSCOPY N/A 05/21/2021    Procedure: COLONOSCOPY;  Surgeon: Hipolito Alexander MD;  Location: [facility]    IR LIVER BIOPSY PERCUTANEOUS  1/5/2024    PERCUTANEOUS BIOPSY LIVER N/A 1/5/2024    Procedure: Percutaneous biopsy liver;  Surgeon: Alvarez Alexander MD;  [place] [facility] [place]   Had small bowel resection with  appendectomy 25 years ago         Social History:   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 No   Social History Narrative    Not on file     Social Determinants of Health     Financial Resource Strain: Not on file   Food Insecurity: Not on file   Transportation Needs: Not on file   Physical Activity: Not on file   Stress: Not on file   Social Connections: Not on file   Interpersonal Safety: Not on file   Housing Stability: Not on file            Family History:   [place] ancestry  Family History   Problem Relation Age of Onset    Diabetes Mother     Heart Disease Mother         stent placement.    Morbid Obesity Mother         bypass    Valvular heart disease Father     Diabetes Brother     Liver Disease No family hx of    Has 2 children 29 Fabricio and Ric 27 both healthy  Younger brother has DM and skin cancer  No hx of iron overload  Father had nerve deafness and older brother had hearing aids in his 20s. No kidney issues       Allergies:   No Known Allergies         Medications:   (Not in a hospital admission)      PHYSICAL EXAMINATION:    By the video, he is an alert gentleman, verbal, in no acute distress.     EYES:  Grossly normal to inspection, no discharge, erythema or icterus.   SKIN:  Visible skin is clear.  No significant rash or abnormal pigmentation.   NEUROLOGIC:  Cranial nerves seem to be intact.  Mentation and speech is appropriate  for age.   PSYCHIATRIC:  Mentation appears normal.  He does not seem anxious today.            Data:   I have personally reviewed the following labs/imaging:  CBC@LABRCNTIPR(wbc:4,rbc:4,hgb:4,hct:4,mcv:4,mch:4,mchc:4,rdw:4,plt:4)@  CMP@LABRCNTIPR(na:4,potassium:4,chloride:4,co2:4,aniongap:4,g,bun:4,cr:4,gfrestimated:4,gfrestblack:4,hiram:4,ma,phos:4,prottotal:4,albumin:4,bilitotal:4,alkphos:   Latest Reference Range & Units 24 07:36   Sodium 135 - 145 mmol/L 140   Potassium 3.4 - 5.3 mmol/L 4.1   Chloride 98 - 107 mmol/L 108 (H)   Carbon Dioxide (CO2) 22 - 29 mmol/L 25   Urea Nitrogen 6.0 - 20.0 mg/dL 12.9   Creatinine 0.67 - 1.17 mg/dL 0.76   GFR Estimate >60 mL/min/1.73m2 >90   Calcium 8.6 - 10.0 mg/dL 8.9   Anion Gap 7 - 15 mmol/L 7   Albumin 3.5 - 5.2 g/dL 4.0   Protein Total 6.4 - 8.3 g/dL 7.6   Alkaline Phosphatase 40 - 150 U/L 230 (H)   ALT 0 - 70 U/L 73 (H)   AST 0 - 45 U/L 59 (H)   Bilirubin Total <=1.2 mg/dL 1.5 (H)   CRP Inflammation <5.00 mg/L <3.00   Ferritin 31 - 409 ng/mL 959 (H)   Glucose 70 - 99 mg/dL 190 (H)   Iron 61 - 157 ug/dL 151   Iron Binding Capacity 240 - 430 ug/dL 242   Iron Sat Index 15 - 46 % 62 (H)   WBC 4.0 - 11.0 10e3/uL 3.0 (L)   Hemoglobin 13.3 - 17.7 g/dL 15.5   Hematocrit 40.0 - 53.0 % 43.2   Platelet Count 150 - 450 10e3/uL 61 (L)   RBC Count 4.40 - 5.90 10e6/uL 4.33 (L)   MCV 78 - 100 fL 100   MCH 26.5 - 33.0 pg 35.8 (H)   MCHC 31.5 - 36.5 g/dL 35.9   RDW 10.0 - 15.0 % 12.7   % Neutrophils % 57   % Lymphocytes % 28   % Monocytes % 11   % Eosinophils % 3   % Basophils % 1   Absolute Basophils 0.0 - 0.2 10e3/uL 0.0   Absolute Eosinophils 0.0 - 0.7 10e3/uL 0.1   Absolute Immature Granulocytes <=0.4 10e3/uL 0.0   Absolute Lymphocytes 0.0 - 5.3 10e3/uL 0.8   Absolute Monocytes 0.0 - 1.3 10e3/uL 0.3   % Immature Granulocytes % 0   Absolute Neutrophils 1.6 - 8.3 10e3/uL 1.7   Absolute NRBCs 10e3/uL 0.0   NRBCs per 100 WBC <1 /100 0   % Retic 0.5 - 2.0 % 2.4 (H)   Absolute  Retic 0.025 - 0.095 10e6/uL 0.103 (H)   (H): Data is abnormally high  (L): Data is abnormally low4,ast:4,alt:4)@  INR@LABRCNTIPR(inr:4)@   Kishor Andrea MD

## 2024-03-05 NOTE — NURSING NOTE
Is the patient currently in the state of [place]? YES    Visit mode:VIDEO    If the visit is dropped, the patient can be reconnected by: VIDEO VISIT: Send to e-mail at: [email or web address]    Will anyone else be joining the visit? Yes, pt's wife Francheska will be joining.   (If patient encounters technical issues they should call [phone]    How would you like to obtain your AVS? MyChart    Are changes needed to the allergy or medication list? No    Reason for visit: RECHECK    Medications and allergies have been reviewed.      Sin BRASHER

## 2024-03-12 DIAGNOSIS — E83.110 HEREDITARY HEMOCHROMATOSIS (H): Primary | ICD-10-CM

## 2024-03-24 NOTE — PROGRESS NOTES
[facility] Hepatology    Assessment  59 year old male with past medical history of compensated cirrhosis secondary to hemochromatosis (H63D heterozygote) + ?MASLD and type 2 diabetes.     #. Compensated Cirrhosis  #. Hemochromatosis (H63D heterozygote)  #. Iron overload   Patient with compensated cirrhosis in the setting of iron overload and hemochromatosis but the patient is only an H63D heterozygote.  Conventionally H63D heterozygosity on its own should not cause this picture iron overload.  The patient has been noted to have changes of hepatic steatosis since 2016 but the only metabolic comorbidity he has is type 2 diabetes which arose around the same time the patient was noted to have liver disease.  He may have some underlying component of metabolic associated steatotic liver disease but it is difficult to tell at this juncture.  The patient continues to have abnormal liver studies in the setting of elevated ferritin of 999 on last check.  He continues to undergo phlebotomy with a goal ferritin of .     The patient has no history of ascites or hepatic encephalopathy.  No history of variceal bleeding.  He is due for upper endoscopy for variceal screening as well as abdominal ultrasound for HCC surveillance.    #. Alpha 1 antitrypsin S heterozygosity - not associated with increased risk of liver disease.  #. Neuropathy, lower extremity    Plan  - Start gabapentin 300mg at bedtime PRN neuropathy  - Phlebotomy with target ferritin level of 50-100ug/L; management per Dr. Andrea  - Genetic counseling ordered; consideration of testing for hemojuvelin, ferroportin, hepcidin,and transferrin 2 mutations. Scheduled 4/4/2024.   - Per Dr. Powell, patient is on vitamin E 200 units/day as an anti-oxidant   - Avoid vitamin C supplements, iron supplements, daily (red) meat consumption, and raw/undercooked seafood   - Avoid alcohol completely given cirrhosis  - Avoid aspirin. Okay for tylenol up to 2 grams  per day.   - No contraindication to statins from a liver perspective. Metabolic syndrome management per PCP   - HCC screening: abdominal US + AFP every 6 months; due now - ordered   - Variceal screening: scheduled 5/22/2024    Health Maintenance:  - Recommend yearly influenza vaccination   - Hep A non-immune: recommend vaccination per PCP   - Colon Cancer Screening: due 2031    Education:  - Long term potential effects of iron deposition include, but are not limited to hormone dysregulation, heart failure, cirrhosis, liver cancer, diabetes, pancreatic exocrine insufficiency, decreased libido, decreased fertility, and arthritis.  - Phlebotomy may improve fatigue, arthralgias and liver function tests  - All first-degree family relatives should be tested for hemochromatosis    RTC: 6 months    Manju Lambert MD (Lizzie)  Advanced & Transplant Hepatology  [facility]    I spent 40 minutes on this encounter performing the following: reviewing the patient's medical record (clinic visits, hospital records, lab results, imaging and procedural documentation), history taking, physical exam and documentation on the date of the encounter. I also spent part of the time in coordination of care and counseling.    HPI:  Cirrhosis  - dx ~ 2024  - Etiology: H63D heterozygote + iron overload + ?MASLD  - no hx HE  - no hx ascites  - no hx variceal bleed  - last EGD pending   - HCC screening - US 9/2023 without lesions    Patient presents with his wife.    Patient reports doing generally well without any acute concerns or complaints.  He reports that his energy level is good and that he is working full-time.  His biggest concern is that he has neuropathy in his feet bilaterally that is associated with pain and interrupts his sleep.    He has been undergoing phlebotomy every 2 weeks.  He is adherent to his vitamin D 200 international units/day.  Patient is doing well with avoiding certain meats and fortified  foods and I cast iron pan use.    He denies any overt signs or symptoms of decompensated disease such as lower extremity edema, abdominal swelling, melena or hematochezia.  He has 1 bowel movement per day.    Medical hx Surgical hx   Past Medical History:   Diagnosis Date    Cirrhosis of liver (H)     H63D heterozygote    Diabetes mellitus, type 2 (H)     Hyperlipidemia       Past Surgical History:   Procedure Laterality Date    APPENDECTOMY      COLONOSCOPY N/A 05/21/2021    Procedure: COLONOSCOPY;  Surgeon: Hipolito Alexander MD;  Location: [facility]    IR LIVER BIOPSY PERCUTANEOUS  1/5/2024    PERCUTANEOUS BIOPSY LIVER N/A 1/5/2024    Procedure: Percutaneous biopsy liver;  Surgeon: Alvarez Alexander MD;  Location: [facility]          Medications  Current Outpatient Medications   Medication Sig Dispense Refill    gabapentin (NEURONTIN) 300 MG capsule Take 1 capsule (300 mg) by mouth nightly as needed for neuropathic pain 30 capsule 3    metFORMIN (GLUCOPHAGE XR) 500 MG 24 hr tablet Take 1 tablet (500 mg) by mouth daily (with dinner) 90 tablet 3    Vitamin E 90 MG (200 UNIT) capsule Take 200 Units by mouth daily       Allergies  No Known Allergies    Family hx Social hx   Family History   Problem Relation Age of Onset    Diabetes Mother     Heart Disease Mother         stent placement.    Morbid Obesity Mother         bypass    Valvular heart disease Father     Diabetes Brother     Liver Disease No family hx of      No family history of colon cancer  Social History     Tobacco Use    Smoking status: Never     Passive exposure: Past    Smokeless tobacco: Never   Vaping Use    Vaping Use: Never used   Substance Use Topics    Alcohol use: Yes     Comment: rare    Drug use: No     - Employment: Works in heating and air conditioning  - Lives with wife.  Both are Jehovah's Witnesses  - Alcohol: 1 alcohol-containing beverage per month  - Tobacco: Denies  - Drugs: Denies     Review of systems  A 10-point review of systems was  "negative.    Examination  /78 (BP Location: Left arm, Patient Position: Sitting, Cuff Size: Adult Regular)   Pulse 69   Temp 97.6  F (36.4  C) (Oral)   Resp 16   Ht 1.88 m (6' 2.02\")   Wt 87.7 kg (193 lb 4.8 oz)   SpO2 98%   BMI 24.81 kg/m     Body mass index is 24.81 kg/m .    Gen-NAD  Eye- EOMI  ENT- MMM  CVS- RRR, no murmurs  RS- CTA bilaterally  Abd-soft, nontender, nondistended.  Surgical scar well-healed  Extr- 2+ radial pulses bilaterally, no lower extremity edema bilaterally  MS- hands without clubbing  Neuro- A+Ox3, no asterixis  Skin- no jaundice.  No palmar erythema or spider angioma.  Examination of right foot demonstrates normal-appearing skin.  Psych- normal mood    Laboratory  BMP  Recent Labs   Lab Test 03/27/24 0721 03/04/24 0736 01/30/24  0912 01/05/24  0652 12/27/23  0655    140 136  --  139   POTASSIUM 4.5 4.1 4.2  --  4.4   CHLORIDE 109* 108* 105  --  106   NAMRATA 9.2 8.9 9.3  --  9.3   CO2 25 25 23  --  27   BUN 14.0 12.9 15.7  --  12.6   CR 0.78 0.76 0.68  --  0.76   * 190* 310* 135* 224*     CBC  Recent Labs   Lab Test 03/27/24 0721 03/04/24 0736 01/30/24 0912 12/27/23  0655   WBC 2.9* 3.0* 3.1* 2.3*   RBC 4.24* 4.33* 4.17* 4.27*   HGB 15.1 15.5 14.9 15.6   HCT 41.8 43.2 40.7 43.1   MCV 99 100 98 101*   MCH 35.6* 35.8* 35.7* 36.5*   MCHC 36.1 35.9 36.6* 36.2   RDW 12.3 12.7 11.9 12.2   PLT 55* 61* 54* 58*     Liver Enzymes   Recent Labs   Lab Test 03/27/24 0721   PROTTOTAL 7.4   ALBUMIN 3.9   BILITOTAL 1.0   ALKPHOS 206*   AST 66*   ALT 71*      INR   INR   Date Value Ref Range Status   03/27/2024 1.24 (H) 0.85 - 1.15 Final       Hep B s Ag non-reactive  Hep B c Ab non-reactive  Hep B s Ab 4.97  Hep C Ab non-reactive  HIV non-reactive  Iron sat: 54%  Ferritin: 1247 (12/2023)  CRISTINA negative  F-actin: 18  A1AT: S heterozygote    Hemochromatosis:   C282Y: normal  H63D: heterozygote   S65C: normal     Liver Biopsy 1/5/2024  Advanced cirrhosis, inactive (Laennec " fibrosis stage 4C):   -With hemosiderosis, (3-4+ iron on a scale of 0-4)   -Etiology uncertain but see comment    Radiology  9/26/2023 US Elastrography  The median liver stiffness is 3.10 m/sec and the IQR/median value is  0.05 . This is a high elastography value indicating advanced chronic  liver disease and portal hypertension.    Endoscopy   Colonoscopy 2021  - Non-bleeding internal hemorrhoids.   - No specimens collected.

## 2024-03-27 ENCOUNTER — OFFICE VISIT (OUTPATIENT)
Dept: GASTROENTEROLOGY | Facility: CLINIC | Age: 59
End: 2024-03-27
Attending: INTERNAL MEDICINE
Payer: COMMERCIAL

## 2024-03-27 ENCOUNTER — LAB (OUTPATIENT)
Dept: LAB | Facility: CLINIC | Age: 59
End: 2024-03-27
Payer: COMMERCIAL

## 2024-03-27 VITALS
RESPIRATION RATE: 16 BRPM | BODY MASS INDEX: 24.81 KG/M2 | HEART RATE: 69 BPM | DIASTOLIC BLOOD PRESSURE: 78 MMHG | SYSTOLIC BLOOD PRESSURE: 133 MMHG | TEMPERATURE: 97.6 F | WEIGHT: 193.3 LBS | OXYGEN SATURATION: 98 % | HEIGHT: 74 IN

## 2024-03-27 DIAGNOSIS — G62.9 NEUROPATHY: ICD-10-CM

## 2024-03-27 DIAGNOSIS — E83.110 HEREDITARY HEMOCHROMATOSIS (H): Primary | ICD-10-CM

## 2024-03-27 DIAGNOSIS — E11.69 TYPE 2 DIABETES MELLITUS WITH OTHER SPECIFIED COMPLICATION, WITHOUT LONG-TERM CURRENT USE OF INSULIN (H): ICD-10-CM

## 2024-03-27 DIAGNOSIS — E83.110 HEREDITARY HEMOCHROMATOSIS (H): ICD-10-CM

## 2024-03-27 DIAGNOSIS — R77.2 ELEVATED AFP: Primary | ICD-10-CM

## 2024-03-27 LAB
AFP SERPL-MCNC: 10.3 NG/ML
ALBUMIN SERPL BCG-MCNC: 3.9 G/DL (ref 3.5–5.2)
ALP SERPL-CCNC: 206 U/L (ref 40–150)
ALT SERPL W P-5'-P-CCNC: 71 U/L (ref 0–70)
ANION GAP SERPL CALCULATED.3IONS-SCNC: 7 MMOL/L (ref 7–15)
AST SERPL W P-5'-P-CCNC: 66 U/L (ref 0–45)
BILIRUB DIRECT SERPL-MCNC: 0.37 MG/DL (ref 0–0.3)
BILIRUB SERPL-MCNC: 1 MG/DL
BUN SERPL-MCNC: 14 MG/DL (ref 6–20)
CALCIUM SERPL-MCNC: 9.2 MG/DL (ref 8.6–10)
CHLORIDE SERPL-SCNC: 109 MMOL/L (ref 98–107)
CREAT SERPL-MCNC: 0.78 MG/DL (ref 0.67–1.17)
DEPRECATED HCO3 PLAS-SCNC: 25 MMOL/L (ref 22–29)
EGFRCR SERPLBLD CKD-EPI 2021: >90 ML/MIN/1.73M2
ERYTHROCYTE [DISTWIDTH] IN BLOOD BY AUTOMATED COUNT: 12.3 % (ref 10–15)
GLUCOSE SERPL-MCNC: 166 MG/DL (ref 70–99)
HBA1C MFR BLD: 7.6 %
HCT VFR BLD AUTO: 41.8 % (ref 40–53)
HGB BLD-MCNC: 15.1 G/DL (ref 13.3–17.7)
INR PPP: 1.24 (ref 0.85–1.15)
MCH RBC QN AUTO: 35.6 PG (ref 26.5–33)
MCHC RBC AUTO-ENTMCNC: 36.1 G/DL (ref 31.5–36.5)
MCV RBC AUTO: 99 FL (ref 78–100)
PLATELET # BLD AUTO: 55 10E3/UL (ref 150–450)
POTASSIUM SERPL-SCNC: 4.5 MMOL/L (ref 3.4–5.3)
PROT SERPL-MCNC: 7.4 G/DL (ref 6.4–8.3)
RBC # BLD AUTO: 4.24 10E6/UL (ref 4.4–5.9)
SODIUM SERPL-SCNC: 141 MMOL/L (ref 135–145)
WBC # BLD AUTO: 2.9 10E3/UL (ref 4–11)

## 2024-03-27 PROCEDURE — 80053 COMPREHEN METABOLIC PANEL: CPT | Performed by: PATHOLOGY

## 2024-03-27 PROCEDURE — 36415 COLL VENOUS BLD VENIPUNCTURE: CPT | Performed by: PATHOLOGY

## 2024-03-27 PROCEDURE — 85027 COMPLETE CBC AUTOMATED: CPT | Performed by: PATHOLOGY

## 2024-03-27 PROCEDURE — 83036 HEMOGLOBIN GLYCOSYLATED A1C: CPT | Performed by: INTERNAL MEDICINE

## 2024-03-27 PROCEDURE — 99000 SPECIMEN HANDLING OFFICE-LAB: CPT | Performed by: PATHOLOGY

## 2024-03-27 PROCEDURE — 82248 BILIRUBIN DIRECT: CPT | Performed by: PATHOLOGY

## 2024-03-27 PROCEDURE — 85610 PROTHROMBIN TIME: CPT | Performed by: PATHOLOGY

## 2024-03-27 PROCEDURE — 82105 ALPHA-FETOPROTEIN SERUM: CPT | Performed by: INTERNAL MEDICINE

## 2024-03-27 PROCEDURE — G0463 HOSPITAL OUTPT CLINIC VISIT: HCPCS | Performed by: INTERNAL MEDICINE

## 2024-03-27 PROCEDURE — 99215 OFFICE O/P EST HI 40 MIN: CPT | Performed by: INTERNAL MEDICINE

## 2024-03-27 RX ORDER — GABAPENTIN 300 MG/1
300 CAPSULE ORAL
Qty: 30 CAPSULE | Refills: 3 | Status: SHIPPED | OUTPATIENT
Start: 2024-03-27

## 2024-03-27 RX ORDER — VITAMIN E (DL,TOCOPHERYL ACET) 90 MG
200 CAPSULE ORAL DAILY
COMMUNITY

## 2024-03-27 ASSESSMENT — PAIN SCALES - GENERAL: PAINLEVEL: MODERATE PAIN (4)

## 2024-03-27 NOTE — NURSING NOTE
"Chief Complaint   Patient presents with    RECHECK     Hemochromatosis      Vital signs:  Temp: 97.6  F (36.4  C) Temp src: Oral BP: 133/78 Pulse: 69   Resp: 16 SpO2: 98 %     Height: 188 cm (6' 2.02\") Weight: 87.7 kg (193 lb 4.8 oz)  Estimated body mass index is 24.81 kg/m  as calculated from the following:    Height as of this encounter: 1.88 m (6' 2.02\").    Weight as of this encounter: 87.7 kg (193 lb 4.8 oz).      Anya Huynh, [facility]  3/27/2024 8:06 AM    "

## 2024-03-27 NOTE — LETTER
3/27/2024         RE: Demetrius Burton  [address] [facility] [address]        Dear Colleague,    Thank you for referring your patient, Demetrius Burton, to the [facility]PAT[facility] [place]. Please see a copy of my visit note below.    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     #. Compensated Cirrhosis  #. Hemochromatosis (H63D heterozygote)  #. Iron overload   Patient with compensated cirrhosis in the setting of iron overload and hemochromatosis but the patient is only an H63D heterozygote.  Conventionally H63D heterozygosity on its own should not cause this picture iron overload.  The patient has been noted to have changes of hepatic steatosis since 2016 but the only metabolic comorbidity he has is type 2 diabetes which arose around the same time the patient was noted to have liver disease.  He may have some underlying component of metabolic associated steatotic liver disease but it is difficult to tell at this juncture.  The patient continues to have abnormal liver studies in the setting of elevated ferritin of 999 on last check.  He continues to undergo phlebotomy with a goal ferritin of .     The patient has no history of ascites or hepatic encephalopathy.  No history of variceal bleeding.  He is due for upper endoscopy for variceal screening as well as abdominal ultrasound for HCC surveillance.    #. Alpha 1 antitrypsin S heterozygosity - not associated with increased risk of liver disease.  #. Neuropathy, lower extremity    Plan  - Start gabapentin 300mg at bedtime PRN neuropathy  - Phlebotomy with target ferritin level of 50-100ug/L; management per Dr. Andrea  - Genetic counseling ordered; consideration of testing for hemojuvelin, ferroportin, hepcidin,and transferrin 2 mutations. Scheduled 4/4/2024.   - Per   Sherry, patient is on vitamin E 200 units/day as an anti-oxidant   - Avoid vitamin C supplements, iron supplements, daily (red) meat consumption, and raw/undercooked seafood   - Avoid alcohol completely given cirrhosis  - Avoid aspirin. Okay for tylenol up to 2 grams per day.   - No contraindication to statins from a liver perspective. Metabolic syndrome management per PCP   - HCC screening: abdominal US + AFP every 6 months; due now - ordered   - Variceal screening: scheduled 5/22/2024    Health Maintenance:  - Recommend yearly influenza vaccination   - Hep A non-immune: recommend vaccination per PCP   - Colon Cancer Screening: due 2031    Education:  - Long term potential effects of iron deposition include, but are not limited to hormone dysregulation, heart failure, cirrhosis, liver cancer, diabetes, pancreatic exocrine insufficiency, decreased libido, decreased fertility, and arthritis.  - Phlebotomy may improve fatigue, arthralgias and liver function tests  - All first-degree family relatives should be tested for hemochromatosis    RTC: 6 months    Manju Lambert MD (Lizzie)  Advanced & Transplant Hepatology  [facility]    I spent 40 minutes on this encounter performing the following: reviewing the patient's medical record (clinic visits, hospital records, lab results, imaging and procedural documentation), history taking, physical exam and documentation on the date of the encounter. I also spent part of the time in coordination of care and counseling.    HPI:  Cirrhosis  - dx ~ 2024  - Etiology: H63D heterozygote + iron overload + ?MASLD  - no hx HE  - no hx ascites  - no hx variceal bleed  - last EGD pending   - HCC screening - US 9/2023 without lesions    Patient presents with his wife.    Patient reports doing generally well without any acute concerns or complaints.  He reports that his energy level is good and that he is working full-time.  His biggest concern is that he has  neuropathy in his feet bilaterally that is associated with pain and interrupts his sleep.    He has been undergoing phlebotomy every 2 weeks.  He is adherent to his vitamin D 200 international units/day.  Patient is doing well with avoiding certain meats and fortified foods and I cast iron pan use.    He denies any overt signs or symptoms of decompensated disease such as lower extremity edema, abdominal swelling, melena or hematochezia.  He has 1 bowel movement per day.    Medical hx Surgical hx   Past Medical History:   Diagnosis Date     Cirrhosis of liver (H)     H63D heterozygote     Diabetes mellitus, type 2 (H)      Hyperlipidemia       Past Surgical History:   Procedure Laterality Date     APPENDECTOMY       COLONOSCOPY N/A 05/21/2021    Procedure: COLONOSCOPY;  Surgeon: Hipolito Alexander MD;  [facility]: [facility]     IR LIVER BIOPSY PERCUTANEOUS  1/5/2024     PERCUTANEOUS BIOPSY LIVER N/A 1/5/2024    Procedure: Percutaneous biopsy liver;  Surgeon: Alvarez Alexander MD;  Location: [facility] [place]          Medications  Current Outpatient Medications   Medication Sig Dispense Refill     gabapentin (NEURONTIN) 300 MG capsule Take 1 capsule (300 mg) by mouth nightly as needed for neuropathic pain 30 capsule 3     metFORMIN (GLUCOPHAGE XR) 500 MG 24 hr tablet Take 1 tablet (500 mg) by mouth daily (with dinner) 90 tablet 3     Vitamin E 90 MG (200 UNIT) capsule Take 200 Units by mouth daily       Allergies  No Known Allergies    Family hx Social hx   Family History   Problem Relation Age of Onset     Diabetes Mother      Heart Disease Mother         stent placement.     Morbid Obesity Mother         bypass     Valvular heart disease Father      Diabetes Brother      Liver Disease No family hx of      No family history of colon cancer  Social History     Tobacco Use     Smoking status: Never     Passive exposure: Past     Smokeless tobacco: Never   Vaping Use     Vaping Use: Never used   Substance Use Topics     Alcohol use:  "Yes     Comment: rare     Drug use: No     - Employment: Works in heating and air conditioning  - Lives with wife.  Both are Jehovah's Witnesses  - Alcohol: 1 alcohol-containing beverage per month  - Tobacco: Denies  - Drugs: Denies     Review of systems  A 10-point review of systems was negative.    Examination  /78 (BP Location: Left arm, Patient Position: Sitting, Cuff Size: Adult Regular)   Pulse 69   Temp 97.6  F (36.4  C) (Oral)   Resp 16   Ht 1.88 m (6' 2.02\")   Wt 87.7 kg (193 lb 4.8 oz)   SpO2 98%   BMI 24.81 kg/m     Body mass index is 24.81 kg/m .    Gen-NAD  Eye- EOMI  ENT- MMM  CVS- RRR, no murmurs  RS- CTA bilaterally  Abd-soft, nontender, nondistended.  Surgical scar well-healed  Extr- 2+ radial pulses bilaterally, no lower extremity edema bilaterally  MS- hands without clubbing  Neuro- A+Ox3, no asterixis  Skin- no jaundice.  No palmar erythema or spider angioma.  Examination of right foot demonstrates normal-appearing skin.  Psych- normal mood    Laboratory  BMP  Recent Labs   Lab Test 03/27/24  0721 03/04/24  0736 01/30/24  0912 01/05/24  0652 12/27/23  0655    140 136  --  139   POTASSIUM 4.5 4.1 4.2  --  4.4   CHLORIDE 109* 108* 105  --  106   NAMRATA 9.2 8.9 9.3  --  9.3   CO2 25 25 23  --  27   BUN 14.0 12.9 15.7  --  12.6   CR 0.78 0.76 0.68  --  0.76   * 190* 310* 135* 224*     CBC  Recent Labs   Lab Test 03/27/24  0721 03/04/24  0736 01/30/24  0912 12/27/23  0655   WBC 2.9* 3.0* 3.1* 2.3*   RBC 4.24* 4.33* 4.17* 4.27*   HGB 15.1 15.5 14.9 15.6   HCT 41.8 43.2 40.7 43.1   MCV 99 100 98 101*   MCH 35.6* 35.8* 35.7* 36.5*   MCHC 36.1 35.9 36.6* 36.2   RDW 12.3 12.7 11.9 12.2   PLT 55* 61* 54* 58*     Liver Enzymes   Recent Labs   Lab Test 03/27/24  0721   PROTTOTAL 7.4   ALBUMIN 3.9   BILITOTAL 1.0   ALKPHOS 206*   AST 66*   ALT 71*      INR   INR   Date Value Ref Range Status   03/27/2024 1.24 (H) 0.85 - 1.15 Final       Hep B s Ag non-reactive  Hep B c Ab " non-reactive  Hep B s Ab 4.97  Hep C Ab non-reactive  HIV non-reactive  Iron sat: 54%  Ferritin: 1247 (12/2023)  CRISTINA negative  F-actin: 18  A1AT: S heterozygote    Hemochromatosis:   C282Y: normal  H63D: heterozygote   S65C: normal     Liver Biopsy 1/5/2024  Advanced cirrhosis, inactive (Laennec fibrosis stage 4C):   -With hemosiderosis, (3-4+ iron on a scale of 0-4)   -Etiology uncertain but see comment    Radiology  9/26/2023 US Elastrography  The median liver stiffness is 3.10 m/sec and the IQR/median value is  0.05 . This is a high elastography value indicating advanced chronic  liver disease and portal hypertension.    Endoscopy   Colonoscopy 2021  - Non-bleeding internal hemorrhoids.   - No specimens collected.       Again, thank you for allowing me to participate in the care of your patient.        Sincerely,        Manju Lambert MD

## 2024-03-29 ENCOUNTER — INFUSION THERAPY VISIT (OUTPATIENT)
Dept: ONCOLOGY | Facility: CLINIC | Age: 59
End: 2024-03-29
Attending: INTERNAL MEDICINE
Payer: COMMERCIAL

## 2024-03-29 ENCOUNTER — DOCUMENTATION ONLY (OUTPATIENT)
Dept: ONCOLOGY | Facility: CLINIC | Age: 59
End: 2024-03-29
Payer: COMMERCIAL

## 2024-03-29 VITALS
SYSTOLIC BLOOD PRESSURE: 108 MMHG | DIASTOLIC BLOOD PRESSURE: 71 MMHG | OXYGEN SATURATION: 97 % | TEMPERATURE: 98.2 F | HEART RATE: 81 BPM

## 2024-03-29 DIAGNOSIS — E83.110 HEREDITARY HEMOCHROMATOSIS (H): ICD-10-CM

## 2024-03-29 PROCEDURE — 96360 HYDRATION IV INFUSION INIT: CPT

## 2024-03-29 PROCEDURE — 258N000003 HC RX IP 258 OP 636: Performed by: INTERNAL MEDICINE

## 2024-03-29 RX ADMIN — SODIUM CHLORIDE 500 ML: 9 INJECTION, SOLUTION INTRAVENOUS at 15:39

## 2024-03-29 ASSESSMENT — PAIN SCALES - GENERAL: PAINLEVEL: MILD PAIN (2)

## 2024-03-29 NOTE — PROGRESS NOTES
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      Objective:    Con. And Alert Ox3, Skin is PWD    Assessment:            BP:  85/50    Pulse: 81  Respiration: 16  SPO2: 97 %  Glucose:  mg/dl  Mental Status: Alert  CMS: Intact  Stroke Scale: Not Applicable  EKG: Not Performed      Treatment:    Medication: NS      Location:      2nd floor    Disposition:      Continue with his Treatment.    Protocol Used:     Hypotension

## 2024-03-29 NOTE — PATIENT INSTRUCTIONS
[facility] Triage and after hours / weekends / holidays:  [phone] option 5, option 2    Please call the triage or after hours line if you experience a temperature greater than or equal to 100.4, shaking chills, have uncontrolled nausea, vomiting and/or diarrhea, dizziness, shortness of breath, chest pain, bleeding, unexplained bruising, or if you have any other new/concerning symptoms, questions or concerns.      If you are having any concerning symptoms or wish to speak to a provider before your next infusion visit, please call triage to notify your care team so we can adequately serve you.     If you need a refill on a narcotic prescription or other medication, please call before your infusion appointment.

## 2024-03-29 NOTE — PROGRESS NOTES
Infusion Nursing Note:  Demetrius Burton presents today for Therapeutic Phlebotomy.    Patient seen by provider today: No   present during visit today: Not Applicable.    Note: Demetrius denied fevers, chills, SOB, or chest pain. No questions or concerns for nursing today.    Reviewed phlebotomy procedure with patient. Patient verbalized understanding.    500 ml blood removed via gravity with ease.    At end of procedure, patient reported feeling dizzy and was pale. BP 67/37. Patient placed in supine position and RRT called. Started normal saline bolus. BP improved to 85/50 shortly after starting fluids. Dr. Andrea notified.     TORB Dr. Andrea/Aisha Lester RN 03/29/24 @ 1547  - give patient IV fluids  - monitor patient  - okay to discharge when he feels better  - add 500cc bolus to phleb PRN moving forward    Patient received 500 ml bolus. Patient then reported feeling better and felt comfortable to discharge home. /71. Educated patient to push fluids at home and take it easy. He should call triage with any feelings of lightheaded/dizziness or increased fatigue.     Intravenous Access:  Peripheral IV placed.    Treatment Conditions:   Latest Reference Range & Units 03/27/24 07:21   Hematocrit 40.0 - 53.0 % 41.8       Results reviewed, labs MET treatment parameters, ok to proceed with treatment.      Discharge Plan:   Discharge instructions reviewed with: Patient.  Patient and/or family verbalized understanding of discharge instructions and all questions answered.  AVS to patient via [employer].  Patient will return 4/19 for next appointment.   Patient discharged in stable condition accompanied by: self.  Departure Mode: Ambulatory.      Manju Lester RN

## 2024-04-04 ENCOUNTER — VIRTUAL VISIT (OUTPATIENT)
Dept: CONSULT | Facility: CLINIC | Age: 59
End: 2024-04-04
Attending: INTERNAL MEDICINE
Payer: COMMERCIAL

## 2024-04-04 DIAGNOSIS — Z71.83 ENCOUNTER FOR NONPROCREATIVE GENETIC COUNSELING AND TESTING: ICD-10-CM

## 2024-04-04 DIAGNOSIS — Z13.71 ENCOUNTER FOR NONPROCREATIVE GENETIC COUNSELING AND TESTING: ICD-10-CM

## 2024-04-04 DIAGNOSIS — Z14.8 CARRIER OF HEMOCHROMATOSIS HFE GENE MUTATION: Primary | Chronic | ICD-10-CM

## 2024-04-04 DIAGNOSIS — E83.19 IRON OVERLOAD: ICD-10-CM

## 2024-04-04 PROCEDURE — 96040 HC GENETIC COUNSELING, EACH 30 MINUTES: CPT | Mod: GT,95

## 2024-04-04 NOTE — PROGRESS NOTES
"GENETIC COUNSELING CONSULTATION     Name:  Demetrius Burtno \"Demetrius\"  :   1965  MRN:   [id number]  Date of service: 2024  Primary Provider: Raj Almazan  Referring Provider: Manju Castillo*    Presenting Information:  Demetrius Burton is a 59 year old male presenting to genetic counseling via video visit due to a history of elevated ferritin with heterozygous HFE H63D variant. He was here today with his wife, Francheska. I met with the family at the request of Dr. Lambert to obtain a 3 generation family history, discuss genetic testing options and obtain informed consent.     HPI:  Per Dr. Lambert's 3/27/24 Note  Patient with compensated cirrhosis in the setting of iron overload and hemochromatosis but the patient is only an H63D heterozygote. Conventionally H63D heterozygosity on its own should not cause this picture iron overload.  The patient has been noted to have changes of hepatic steatosis since 2016 but the only metabolic comorbidity he has is type 2 diabetes which arose around the same time the patient was noted to have liver disease.  He may have some underlying component of metabolic associated steatotic liver disease but it is difficult to tell at this juncture.  The patient continues to have abnormal liver studies in the setting of elevated ferritin of 999 on last check.  He continues to undergo phlebotomy with a goal ferritin of .     Please see Dr. Lambert's referral for a detailed personal history.   Patient Active Problem List   Diagnosis    Other specified forms of hearing loss    HYPERLIPIDEMIA LDL GOAL <160    Prediabetes    Diabetes mellitus, type 2 (H)    Hereditary hemochromatosis (H24)      Past Medical History:   Diagnosis Date    Cirrhosis of liver (H)     H63D heterozygote    Diabetes mellitus, type 2 (H)     Hyperlipidemia      Previous Genetic Testing        Family History:   A three generation pedigree was obtained today by patient report and scanned into the " [facility]. The following information is significant:    Children:  Demetrius has two sons (29 and 27 years old). Both are well and do not have children. Neither has any signs of hearing loss.  Siblings:  Demetrius is the second child born to his parents together.   His eldest brother has had bilateral hearing loss requiring hearing aids since his 20s. He has three children, all well, and several grandchildren, likewise well.  His younger brother is 55 years old and has diabetes and a history of skin cancer. He has 2 children, well, and several grandchildren, also well.  Maternal Relatives:  Mother passed away at 67 years old from a brain aneurysm. She had a history of heart disease requiring stents, diabetes, and gastric bypass.   She had 2 full sister (one in mid 70s, one in her 80s) both of whom are well, as are their children/grandchildren.  Grandmother passed away in her 50s or 60s from a motor vehicle accident..  Grandfather passed away in his 90s. He had had a colostomy bag for 25-30 years when we passed.  Paternal Relatives:  Father is 81 years old and has bilateral hearing loss since his mid 20s. He had cochlear implants placed 20 years ago. He has a history of heart valve replacement (possibly due to previously undetected congenital heart defect?)  He has 3 full brothers including one in his 70s with bilateral hearing loss since his mid 20s who has cochlear implant(s). The brothers and their children/grandchildren are otherwise well.  Grandmother passed away around 70 years old from a heart attack.  Grandfather passed away at 92 years old. He had had bilateral hearing loss since his mid 20s.      Ancestry deferred. Consanguinity denied.     The remainder of the family history was negative for liver disease, iron overload, birth defects, learning difficulties, intellectual disability, vision/hearing loss, seizures, muscle weakness, recurrent miscarriage, sudden death, infant/ death and known genetic conditions.      Please see scanned in pedigree under the media tab.     Discussion:  Today we reviewed the genetics, natural history and inheritance of hereditary hemochromatosis.     Hereditary hemochromatosis is a disorder that causes the body to absorb too much iron from the diet. The excess iron is stored in the body's tissues and organs, particularly the skin, heart, liver, pancreas, and joints. Hereditary hemochromatosis can be the result of many different genetic causes.  The most common genetic cause of hereditary hemochromatosis is due to variants in the HFE gene and this form of the condition is referred to as type 1.  Our discussion centered around hereditary hemochromatosis due to variants in HFE due to the family history information reported and due to the more common nature of this form of hemochromatosis (it is one of the most common genetic disorders in the [place], affecting about 1 million people, mostly those of [place]  descent).    Early symptoms of hereditary hemochromatosis may include extreme tiredness (fatigue), joint pain, abdominal pain, weight loss, and loss of sex drive. As the condition worsens, affected individuals may develop arthritis, liver disease (cirrhosis) or liver cancer, diabetes, heart abnormalities, or skin discoloration. The appearance and severity of symptoms can be affected by environmental and lifestyle factors such as the amount of iron in the diet, alcohol use, and infections.  Symptom of hemochromatosis typically begin in adulthood. Not every individual who carries the genetic cause of hemochromatosis will develop symptoms of the condition.  Males are more commonly affected than women.     Management of hemochromatosis include regular phlebotomy when serum ferritin concentrations are elevated.  Individuals with hemochromatosis should have vaccination against hepatitis A and B and avoid medicinal iron, mineral supplements, excess vitamin C, uncooked seafood, and  alcohol consumption.     Diagnosis of hemochromatosis requires confirmation of increased serum ferritin levels and transferrin saturation, with or without symptoms.  Genetic testing can help to determine the cause of hemochromatosis.  Serum ferritin measurement is the most useful prognostic indicator of disease severity. Liver biopsy is performed to stage the degree of fibrosis with severe ferritin elevation or transaminitis, or to diagnose nonclassical hereditary hemochromatosis in patients with other genetic defects.     Our bodies are made up of millions of cells. Within each of those cells are structures called chromosomes. Our chromosomes contain our genes. Genes can be thought of as the instruction manual for our bodies. They are made up of long strings of base pairs which are abbreviated as A s, T s, C s, and G s. We have 2 copies of nearly every gene; we inherit 1 copy from our mother and 1 copy from our father. Sometimes a change or variant can occur in the letters which make up the gene that interrupts the instruction of the gene. Depending on the gene and the variant, this can be associated with different health problems. The most common gene associated with hemochromatosis is the HFE gene.    Hemochromatosis is a recessive genetic condition.  This means that both copies of the gene (i.e. the copy that we inherit from our father and the copy that we inherit from our mother) must have a disease-causing variant for an individual to be affected.  The most common genetic cause of hemochromatosis is referred to as C282Y.  Around 1/9 (11%) of individuals with  ancestry are carriers for the common C282Y mutation, and around 1/4 individuals of  ancestry are carriers for the common H63D mutation. A carrier is an individual who has one working copy of the HFE gene without a variant and one non-working copy of the HFE gene that contains a variant. Carriers are not expected to have symptoms of  hemochromatosis.    We discussed that Demetrius's prior genetic testing revealed that he is a carrier for the HFE H63D hemochromatosis variant but did not identify a second HFE variant. This does not seem to explain Demetrius's high ferritin levels. At the recommendation of Pura Lambert and Emre, we discussed broader genetic testing for causes of hereditary hemochromatosis including analysis of the following genes: FTH1, HAMP, HFE, HJV, [id number], TFR2.    It is medically necessary to determine if there is an underlying genetic cause for Demetrius's elevated ferritin. This can be important for his own health as some diagnoses may have specific treatment/management recommendation. It is also possible that an underlying cause may predispose Demetrius to other health risks; knowing about these additional health risks can help us stay ahead of Demetrius's healthcare to maximize Demetrius's health. Discovering an underlying reason may help predict the chance for other family members to have similar healthcare needs. Likewise, a genetic diagnosis can provide important reproductive information for Demetrius and their relatives.    We reviewed the three possible results from this genetic test:  A Positive result would mean that we found a change in one of the genes that we believe is causing Demetrius's symptoms.   A Negative result would mean that we did not find any changes in the genes we looked at that are known to cause a genetic condition.  A Variant of Uncertain Significance (VUS) means that we found a change in one of Demetrius's genes, but we are not sure if it causes health issues or if it is just part of the normal variation between individuals. Sometimes the lab requests parental samples in these instances, if that will help them better understand the gene change. A VUS may be reclassified into a positive or negative result in the future, as we learn more about different genes.    We also talked about how there are limitations to genetic testing,  namely that it is limited by our current knowledge regarding genetic conditions.    We reviewed the logistics and billing of genetic testing through [facility] including the $250 out of pocket self pay (does not count against deductible) versus the insurance billing option. We did review that the lab does not allow one to switch to self pay AFTER insurance billing has been submitted. However, [facility] will reach out if a patient's bill exceeds $100 to discuss their payment plan and financial assistance options. This typically happens after the testing is completed, and the patient is responsible for the amount billed after insurance. Demetrius elected to pursue this testing via the self- pay option. His email address ([email or web address]) will be provided to [employer] for direct billing contact.    Plan  1. Informed consent obtained for [facility] Hereditary Hemochromatosis Panel (FTH1, HAMP, HFE, HJV, [id number], TFR2). Buccal kit will be mailed to the home.  2. Once the lab receives the sample, results expected in 3-4 weeks; I will call when available.  3. I will confer with my colleagues regarding any recommendations for genetic testing in light of Demetrius's strong family history of hearing loss and recent personal history of changes in his color vision. I will follow up with Demetrius about this.  4. Additional recommendations per Dr. Lambert     Please do not hesitate to reach out with any questions or concerns. It was a pleasure to participate in Demetrius's care today.       Terese Barrett MS, [facility]  Genetic Counseling  [facility]  Pager: [phone]  Phone: [phone]  Fax: [phone]    Approximate Time Spent in Consultation: 50 min

## 2024-04-04 NOTE — PATIENT INSTRUCTIONS
Plan  1. Informed consent obtained for [facility] Hereditary Hemochromatosis Panel (FTH1, HAMP, HFE, HJV, [id number], TFR2). Buccal kit will be mailed to the home.  2. Once the lab receives the sample, results expected in 3-4 weeks; I will call when available.  3. I will confer with my colleagues regarding any recommendations for genetic testing in light of Demetrius's strong family history of hearing loss and recent personal history of changes in his color vision. I will follow up with Demetrius about this.  4. Additional recommendations per Dr. Lambert     Please do not hesitate to reach out with any questions or concerns. It was a pleasure to participate in Demetrius's care today.       Terese Barrett MS, [facility]  Genetic Counseling  [facility]  Phone: [phone]  Fax: [phone]

## 2024-04-04 NOTE — LETTER
"2024      RE: Demetrius Burton  [address] [facility] [address]     Dear Colleague,    Thank you for the opportunity to participate in the care of your patient, Demetrius Burton, at the [facility] at [facility]. Please see a copy of my visit note below.    GENETIC COUNSELING CONSULTATION     Name:  Demetrius Burton \"Demetrius\"  :   1965  MRN:   [id number]  Date of service: 2024  Primary Provider: Raj Almazan  Referring Provider: Manju Castillo*    Presenting Information:  Deemtrius Burton is a 59 year old male presenting to genetic counseling via video visit due to a history of elevated ferritin with heterozygous HFE H63D variant. He was here today with his wife, Francheska. I met with the family at the request of Dr. Lambert to obtain a 3 generation family history, discuss genetic testing options and obtain informed consent.     HPI:  Per Dr. Lambert's 3/27/24 Note  Patient with compensated cirrhosis in the setting of iron overload and hemochromatosis but the patient is only an H63D heterozygote. Conventionally H63D heterozygosity on its own should not cause this picture iron overload.  The patient has been noted to have changes of hepatic steatosis since 2016 but the only metabolic comorbidity he has is type 2 diabetes which arose around the same time the patient was noted to have liver disease.  He may have some underlying component of metabolic associated steatotic liver disease but it is difficult to tell at this juncture.  The patient continues to have abnormal liver studies in the setting of elevated ferritin of 999 on last check.  He continues to undergo phlebotomy with a goal ferritin of .     Please see Dr. Lambert's referral for a detailed personal history.   Patient Active Problem List   Diagnosis    Other specified forms of hearing loss    HYPERLIPIDEMIA LDL GOAL <160    " Prediabetes    Diabetes mellitus, type 2 (H)    Hereditary hemochromatosis (H24)      Past Medical History:   Diagnosis Date    Cirrhosis of liver (H)     H63D heterozygote    Diabetes mellitus, type 2 (H)     Hyperlipidemia      Previous Genetic Testing        Family History:   A three generation pedigree was obtained today by patient report and scanned into the EMR. The following information is significant:    Children:  Demetrius has two sons (29 and 27 years old). Both are well and do not have children. Neither has any signs of hearing loss.  Siblings:  Demetrius is the second child born to his parents together.   His eldest brother has had bilateral hearing loss requiring hearing aids since his 20s. He has three children, all well, and several grandchildren, likewise well.  His younger brother is 55 years old and has diabetes and a history of skin cancer. He has 2 children, well, and several grandchildren, also well.  Maternal Relatives:  Mother passed away at 67 years old from a brain aneurysm. She had a history of heart disease requiring stents, diabetes, and gastric bypass.   She had 2 full sister (one in mid 70s, one in her 80s) both of whom are well, as are their children/grandchildren.  Grandmother passed away in her 50s or 60s from a motor vehicle accident..  Grandfather passed away in his 90s. He had had a colostomy bag for 25-30 years when we passed.  Paternal Relatives:  Father is 81 years old and has bilateral hearing loss since his mid 20s. He had cochlear implants placed 20 years ago. He has a history of heart valve replacement (possibly due to previously undetected congenital heart defect?)  He has 3 full brothers including one in his 70s with bilateral hearing loss since his mid 20s who has cochlear implant(s). The brothers and their children/grandchildren are otherwise well.  Grandmother passed away around 70 years old from a heart attack.  Grandfather passed away at 92 years old. He had had bilateral  hearing loss since his mid 20s.      Ancestry deferred. Consanguinity denied.     The remainder of the family history was negative for liver disease, iron overload, birth defects, learning difficulties, intellectual disability, vision/hearing loss, seizures, muscle weakness, recurrent miscarriage, sudden death, infant/ death and known genetic conditions.     Please see scanned in pedigree under the media tab.     Discussion:  Today we reviewed the genetics, natural history and inheritance of hereditary hemochromatosis.     Hereditary hemochromatosis is a disorder that causes the body to absorb too much iron from the diet. The excess iron is stored in the body's tissues and organs, particularly the skin, heart, liver, pancreas, and joints. Hereditary hemochromatosis can be the result of many different genetic causes.  The most common genetic cause of hereditary hemochromatosis is due to variants in the HFE gene and this form of the condition is referred to as type 1.  Our discussion centered around hereditary hemochromatosis due to variants in HFE due to the family history information reported and due to the more common nature of this form of hemochromatosis (it is one of the most common genetic disorders in the [place], affecting about 1 million people, mostly those of [place]  descent).    Early symptoms of hereditary hemochromatosis may include extreme tiredness (fatigue), joint pain, abdominal pain, weight loss, and loss of sex drive. As the condition worsens, affected individuals may develop arthritis, liver disease (cirrhosis) or liver cancer, diabetes, heart abnormalities, or skin discoloration. The appearance and severity of symptoms can be affected by environmental and lifestyle factors such as the amount of iron in the diet, alcohol use, and infections.  Symptom of hemochromatosis typically begin in adulthood. Not every individual who carries the genetic cause of hemochromatosis will  develop symptoms of the condition.  Males are more commonly affected than women.     Management of hemochromatosis include regular phlebotomy when serum ferritin concentrations are elevated.  Individuals with hemochromatosis should have vaccination against hepatitis A and B and avoid medicinal iron, mineral supplements, excess vitamin C, uncooked seafood, and alcohol consumption.     Diagnosis of hemochromatosis requires confirmation of increased serum ferritin levels and transferrin saturation, with or without symptoms.  Genetic testing can help to determine the cause of hemochromatosis.  Serum ferritin measurement is the most useful prognostic indicator of disease severity. Liver biopsy is performed to stage the degree of fibrosis with severe ferritin elevation or transaminitis, or to diagnose nonclassical hereditary hemochromatosis in patients with other genetic defects.     Our bodies are made up of millions of cells. Within each of those cells are structures called chromosomes. Our chromosomes contain our genes. Genes can be thought of as the instruction manual for our bodies. They are made up of long strings of base pairs which are abbreviated as A s, T s, C s, and G s. We have 2 copies of nearly every gene; we inherit 1 copy from our mother and 1 copy from our father. Sometimes a change or variant can occur in the letters which make up the gene that interrupts the instruction of the gene. Depending on the gene and the variant, this can be associated with different health problems. The most common gene associated with hemochromatosis is the HFE gene.    Hemochromatosis is a recessive genetic condition.  This means that both copies of the gene (i.e. the copy that we inherit from our father and the copy that we inherit from our mother) must have a disease-causing variant for an individual to be affected.  The most common genetic cause of hemochromatosis is referred to as C282Y.  Around 1/9 (11%) of individuals  with  ancestry are carriers for the common C282Y mutation, and around 1/4 individuals of  ancestry are carriers for the common H63D mutation. A carrier is an individual who has one working copy of the HFE gene without a variant and one non-working copy of the HFE gene that contains a variant. Carriers are not expected to have symptoms of hemochromatosis.    We discussed that Demetrius's prior genetic testing revealed that he is a carrier for the HFE H63D hemochromatosis variant but did not identify a second HFE variant. This does not seem to explain Demetrius's high ferritin levels. At the recommendation of Pura Lambert and Emre, we discussed broader genetic testing for causes of hereditary hemochromatosis including analysis of the following genes: FTH1, HAMP, HFE, HJV, [id number], TFR2.    It is medically necessary to determine if there is an underlying genetic cause for Demetrius's elevated ferritin. This can be important for his own health as some diagnoses may have specific treatment/management recommendation. It is also possible that an underlying cause may predispose Demetrius to other health risks; knowing about these additional health risks can help us stay ahead of Demetrius's healthcare to maximize Demetrius's health. Discovering an underlying reason may help predict the chance for other family members to have similar healthcare needs. Likewise, a genetic diagnosis can provide important reproductive information for Demetrius and their relatives.    We reviewed the three possible results from this genetic test:  A Positive result would mean that we found a change in one of the genes that we believe is causing Demetrius's symptoms.   A Negative result would mean that we did not find any changes in the genes we looked at that are known to cause a genetic condition.  A Variant of Uncertain Significance (VUS) means that we found a change in one of Demetrius's genes, but we are not sure if it causes health issues or if it is just  part of the normal variation between individuals. Sometimes the lab requests parental samples in these instances, if that will help them better understand the gene change. A VUS may be reclassified into a positive or negative result in the future, as we learn more about different genes.    We also talked about how there are limitations to genetic testing, namely that it is limited by our current knowledge regarding genetic conditions.    We reviewed the logistics and billing of genetic testing through [facility] including the $250 out of pocket self pay (does not count against deductible) versus the insurance billing option. We did review that the lab does not allow one to switch to self pay AFTER insurance billing has been submitted. However, [facility] will reach out if a patient's bill exceeds $100 to discuss their payment plan and financial assistance options. This typically happens after the testing is completed, and the patient is responsible for the amount billed after insurance. Demetrius elected to pursue this testing via the self- pay option. His email address [email or web address]) will be provided to [facility] for direct billing contact.    Plan  1. Informed consent obtained for [facility] Hereditary Hemochromatosis Panel (FTH1, HAMP, HFE, HJV, [id number], TFR2). Buccal kit will be mailed to the home.  2. Once the lab receives the sample, results expected in 3-4 weeks; I will call when available.  3. I will confer with my colleagues regarding any recommendations for genetic testing in light of Demetrius's strong family history of hearing loss and recent personal history of changes in his color vision. I will follow up with Demetrius about this.  4. Additional recommendations per Dr. Lambert     Please do not hesitate to reach out with any questions or concerns. It was a pleasure to participate in Demetrius's care today.       Terese Barrett MS, [facility]  Genetic Counseling  [facility]  Pager:  [phone]  Phone: [phone]  Fax: [phone]    Approximate Time Spent in Consultation: 50 min

## 2024-04-05 ENCOUNTER — PATIENT OUTREACH (OUTPATIENT)
Dept: CARE COORDINATION | Facility: CLINIC | Age: 59
End: 2024-04-05
Payer: COMMERCIAL

## 2024-04-19 ENCOUNTER — PATIENT OUTREACH (OUTPATIENT)
Dept: CARE COORDINATION | Facility: CLINIC | Age: 59
End: 2024-04-19
Payer: COMMERCIAL

## 2024-04-19 ENCOUNTER — APPOINTMENT (OUTPATIENT)
Dept: LAB | Facility: CLINIC | Age: 59
End: 2024-04-19
Payer: COMMERCIAL

## 2024-04-19 ENCOUNTER — INFUSION THERAPY VISIT (OUTPATIENT)
Dept: INFUSION THERAPY | Facility: CLINIC | Age: 59
End: 2024-04-19
Attending: INTERNAL MEDICINE
Payer: COMMERCIAL

## 2024-04-19 VITALS — TEMPERATURE: 97.8 F | HEART RATE: 74 BPM | DIASTOLIC BLOOD PRESSURE: 78 MMHG | SYSTOLIC BLOOD PRESSURE: 133 MMHG

## 2024-04-19 DIAGNOSIS — E83.110 HEREDITARY HEMOCHROMATOSIS (H): Primary | ICD-10-CM

## 2024-04-19 LAB
FERRITIN SERPL-MCNC: 811 NG/ML (ref 31–409)
HCT VFR BLD AUTO: 41.1 % (ref 40–53)
HGB BLD-MCNC: 14.9 G/DL (ref 13.3–17.7)

## 2024-04-19 PROCEDURE — 85014 HEMATOCRIT: CPT | Performed by: INTERNAL MEDICINE

## 2024-04-19 PROCEDURE — 99195 PHLEBOTOMY: CPT

## 2024-04-19 PROCEDURE — 82728 ASSAY OF FERRITIN: CPT | Performed by: INTERNAL MEDICINE

## 2024-04-19 PROCEDURE — 36415 COLL VENOUS BLD VENIPUNCTURE: CPT | Performed by: INTERNAL MEDICINE

## 2024-04-19 PROCEDURE — 258N000003 HC RX IP 258 OP 636: Performed by: INTERNAL MEDICINE

## 2024-04-19 RX ORDER — HEPARIN SODIUM (PORCINE) LOCK FLUSH IV SOLN 100 UNIT/ML 100 UNIT/ML
5 SOLUTION INTRAVENOUS
Status: CANCELLED | OUTPATIENT
Start: 2024-04-19

## 2024-04-19 RX ORDER — HEPARIN SODIUM,PORCINE 10 UNIT/ML
5-20 VIAL (ML) INTRAVENOUS DAILY PRN
Status: CANCELLED | OUTPATIENT
Start: 2024-04-19

## 2024-04-19 RX ADMIN — SODIUM CHLORIDE 500 ML: 9 INJECTION, SOLUTION INTRAVENOUS at 13:20

## 2024-04-19 NOTE — PROGRESS NOTES
Infusion Nursing Note:  Demetrius Burton presents today for phlebotomy.    Patient seen by provider today: No   present during visit today: Not Applicable.    Note: 500 ml of blood removed from left AC. IV with fluids started in right hand. Pt hx of low BP during phlebotomy.   Tolerated phlebotomy well. Denies feeling dizzy or lightheaded.     Intravenous Access:  Phleb kit used.    Treatment Conditions:  Lab Results   Component Value Date    HGB 14.9 04/19/2024    WBC 2.9 (L) 03/27/2024    ANEUTAUTO 1.7 03/04/2024    PLT 55 (L) 03/27/2024        Results reviewed, labs MET treatment parameters, ok to proceed with treatment.  Hematocrit 41.1    Post Infusion Assessment:  Patient tolerated phlebotomy well.   Site patent and intact, free from redness, edema or discomfort.  Access discontinued per protocol.     Discharge Plan:   Patient discharged in stable condition accompanied by: self.  Departure Mode: Ambulatory.    Albert Avina RN

## 2024-05-01 ENCOUNTER — TELEPHONE (OUTPATIENT)
Dept: CONSULT | Facility: CLINIC | Age: 59
End: 2024-05-01
Payer: COMMERCIAL

## 2024-05-01 DIAGNOSIS — H90.3 SENSORINEURAL HEARING LOSS (SNHL) OF BOTH EARS: Primary | ICD-10-CM

## 2024-05-01 DIAGNOSIS — Z14.8 CARRIER OF HEMOCHROMATOSIS HFE GENE MUTATION: Chronic | ICD-10-CM

## 2024-05-01 NOTE — TELEPHONE ENCOUNTER
"May 1, 2024    I spoke with Demetrius on the phone to discuss his recent genetic testing.    Reason for Testing  Elevated ferritin with heterozygosity for HFE H63D variant    Testing Ordered  Hereditary Hemochromatosis Panel at [facility] (FTH1, HAMP, HFE, HJV, [id number], TFR2)    Result  CARRIER. Demetrius was again identified as a carrier of the HFE genetic variant referred to as \"H63D\". Sequencing and deletion/duplication analysis did not identify any additional known or suspected harmful variants in the remainder of the HFE gene or the other genes assessed.    Interpretation  We reviewed that this is consistent with CARRIER status for HFE-associated hereditary hemochromatosis but would not be expected to cause his elevated ferritin.     HFE-associated hereditary hemochromatosis is inherited in an autosomal recessive pattern.  This means that to be affected, an individual must inherit a harmful variant in both copies of the HFE gene (one from each parent).  Individuals with just one mutation in the HFE gene are said to be carriers.  Carriers do not have the disease but can have an affected child if their partner is also a carrier.  When both parents are carriers, with each pregnancy there is a 25% chance for the child to be affected. Given this result, Demetrius's sons each have a 50% chance of having inherited the HFE H63D variant from Demetrius and being carriers themselves. They may wish to consider carrier testing as part of family planning to clarify risk of having a child with hereditary hemochromatosis.    At this time, we do not have a genetic explanation for Demetrius's elevated ferritin. It may be appropriate to pursue additional genetic testing in the future as we learn more.    Hearing Loss  We touched based about Demetrius's personal and family history of bilateral hearing loss. To summarize, Demetrius has had bilateral hearing loss since his early 20s. His eldest brother also has bilateral hearing loss since his 20s. Demetrius's " father developed bilateral hearing loss in his mid 20s and reportedly had cochlear implants placed ~20 years ago. One of Demetrius's paternal uncles has had bilateral hearing loss since his mid 20s, he also has cochlear implants. Demetrius has two sons in the mid to late 20s who do not have signs of hearing loss to his knowledge.    We reviewed that hearing loss (HL) can have genetic (syndromic or non-syndromic), multifactorial or environmental etiology (prenatal infection,  infection, medication exposure). More than 50% of childhood-onset HL is thought to have genetic causes. Some genetic changes lead to syndromic HL, meaning that there are other medical or developmental differences in that individual that are linked to the occurrence of their HL. On other other hand, some genetic changes lead to non-syndromic hearing loss, in which only hearing is affected and no other medical or developmental differences are expected to be caused by that genetic change.    We reviewed why genetic testing could be beneficial for Demetrius. We may be able to learn more about why their HL occurred, provide information about expected progression and prognosis for HL, learn of other medical or developmental concerns they are at risk for, and estimate recurrence risks for the family.    At this time, we are unable to target genetic testing for a specific condition or gene for Demetrius.  In situations like this, we recommend examining numerous genes associated with the presenting symptom.  For Demetrius, we are targeting genes associated with hearing loss.  We discussed the details, limitations, and possible outcomes of next generation sequencing gene panels.      There are three types of results:  Negative: meaning no pathogenic variants were identified in the genes that were tested  A negative result does not rule out the possibility that Demetrius's symptoms are due to a genetic etiology  The American College of Medical Genetics recommends  follow-up every 3 years with genetics in the event of a negative genetic test result. Subtle features of syndromic forms of HL may not be apparent at birth or early in childhood but may appear as deaf or hard-of-hearing individuals grow into adulthood. Follow-up visits offer the opportunity to inform individuals about new genetic tests that may have become available or changes in the interpretation of previous test results as medical knowledge advances  Positive: meaning one (or more) pathogenic variants were identified in the genes that were tested that are associated with Demetrius's symptoms  We discussed that a positive result could provide an etiology to Demetrius's symptoms, give anticipatory guidance as to their potential progression, and clarify risks to family members.  We also discussed that a positive result could indicate that Demetrius is at risks for other health concerns, outside of their presenting symptoms  Uncertain: meaning one (or more) variants were identified in the genes that were tested, but there is not enough data to know if this variant is disease-causing; these are called variants of uncertain significance (VUS)  In most cases, identification of a VUS does not confirm a diagnosis and does not result in any clinically actionable recommendations.  The variant will be monitored over time to see if more information is known about it in the future.  If a VUS is identified, testing of other relatives may be helpful to provide clarification    We discussed the potential benefits of genetic testing and why this genetic testing is medically indicated. A positive result will help determine the etiology of hearing loss noted in Demetrius and could guide medical management for Demetrius.  If a genetic cause is found for Demetrius, it will give us a more accurate risk assessment for other family members.  Thus, the recommended testing for Demetrius  is DIAGNOSTIC testing, and it is NOT investigational.     We discussed [facility]  Hearing Loss Panel and I explained the insurance billing vs self pay option. Demetrius elected to pursue this testing via the self-pay option and informed consent was obtained. The lab will send him a buccal kit, I will call him when results are available ~4 weeks after testing begins.    Plan  Continue following with Marcia Lambert and Emre for elevated ferritin.  Informed consent obtained for [facility] Hearing Loss Panel via self pay. I will call when results are available.    Demetrius is encouraged to reach out with any additional questions or concerns.    Terese Barrett MS, [facility]  Genetic Counseling  [facility]  Pager: [phone]  Phone: [phone]  Fax: [phone]

## 2024-05-01 NOTE — LETTER
"  5/1/2024    RE: Demetrius Burton  [address] [facility] [address]     Dear Demetrius,    Thank you for the opportunity to participate in your care at [facility]. Please let the following serve as a summary of our conversation regarding your recent genetic testing results. A copy of those results is also enclosed.    Reason for Testing  Elevated ferritin with heterozygosity for HFE H63D variant    Testing Ordered  Hereditary Hemochromatosis Panel at [facility] (FTH1, HAMP, HFE, HJV, [id number], TFR2)    Result  CARRIER. Demetrius was again identified as a carrier of the HFE genetic variant referred to as \"H63D\". Sequencing and deletion/duplication analysis did not identify any additional known or suspected harmful variants in the remainder of the HFE gene or the other genes assessed.    Interpretation  We reviewed that this is consistent with CARRIER status for HFE-associated hereditary hemochromatosis but would not be expected to cause his elevated ferritin.     HFE-associated hereditary hemochromatosis is inherited in an autosomal recessive pattern.  This means that to be affected, an individual must inherit a harmful variant in both copies of the HFE gene (one from each parent).  Individuals with just one mutation in the HFE gene are said to be carriers.  Carriers do not have the disease but can have an affected child if their partner is also a carrier.  When both parents are carriers, with each pregnancy there is a 25% chance for the child to be affected. Given this result, Demetrius's sons each have a 50% chance of having inherited the HFE H63D variant from Demetrius and being carriers themselves. They may wish to consider carrier testing as part of family planning to clarify risk of having a child with hereditary hemochromatosis.    At this time, we do not have a genetic explanation for Demetrius's elevated ferritin. It may be appropriate to pursue additional genetic testing in the future as we learn " more.      Hearing Loss  We touched based about Demetrius's personal and family history of bilateral hearing loss. We reviewed that hearing loss (HL) can have genetic (syndromic or non-syndromic), multifactorial or environmental etiology (prenatal infection,  infection, medication exposure). More than 50% of childhood-onset HL is thought to have genetic causes. Some genetic changes lead to syndromic HL, meaning that there are other medical or developmental differences in that individual that are linked to the occurrence of their HL. On other other hand, some genetic changes lead to non-syndromic hearing loss, in which only hearing is affected and no other medical or developmental differences are expected to be caused by that genetic change.    We reviewed why genetic testing could be beneficial for Demetrius. We may be able to learn more about why their HL occurred, provide information about expected progression and prognosis for HL, learn of other medical or developmental concerns they are at risk for, and estimate recurrence risks for the family.    At this time, we are unable to target genetic testing for a specific condition or gene for Demetrius.  In situations like this, we recommend examining numerous genes associated with the presenting symptom.  For Demetrius, we are targeting genes associated with hearing loss.  We discussed the details, limitations, and possible outcomes of next generation sequencing gene panels.     We discussed [facility] Hearing Loss Panel and I explained the insurance billing vs self pay option. Demetrius elected to pursue this testing via the self-pay option and informed consent was obtained. The lab will send him a buccal kit, I will call him when results are available ~4 weeks after testing begins.    Plan  Continue following with Marcia Lambert and Emre for elevated ferritin.  Informed consent obtained for [facility] Hearing Loss Panel via self pay. I will call when results are  available.    Please do not hesitate to reach out with any additional questions or concerns.    Terese Barrett MS, [facility]  Genetic Counseling  [facility]  Phone: [phone]  Fax: [phone]

## 2024-05-09 ENCOUNTER — TELEPHONE (OUTPATIENT)
Dept: GASTROENTEROLOGY | Facility: CLINIC | Age: 59
End: 2024-05-09
Payer: COMMERCIAL

## 2024-05-09 NOTE — TELEPHONE ENCOUNTER
Upon review platelets are 55. Sent to anesthesia review seeking clarification if ok to proceed at [facility] with borderline platelets.     ---------------------------------------------------------------------------------------     Pre visit planning completed.      Procedure details:    Patient scheduled for Upper endoscopy (EGD) on 5/22/24.     Arrival time: 1230. Procedure time 1330    Facility location: [facility]; [address] [place] [address], [place] [address]. Check in location: 5th Floor.    Sedation type: MAC    Pre op exam needed? N/A    Indication for procedure: variceal screening      Chart review:     Electronic implanted devices? No    Recent diagnosis of diverticulitis within the last 6 weeks? No    Diabetic? Yes. Diabetic medication HOLDING recommendations: Oral diabetic medications: Yes:  Metformin (glucophage): HOLD day of procedure.       Medication review:    Anticoagulants? No    NSAIDS? No    Other medication HOLDING recommendations:  N/A      Prep for procedure:     Bowel prep recommendation: N/A    Prep instructions sent via [employer] - to be sent after anesthesia review.         Blessing Joyce RN  Endoscopy Procedure Pre Assessment RN  [phone] option 4

## 2024-05-10 ENCOUNTER — LAB (OUTPATIENT)
Dept: LAB | Facility: CLINIC | Age: 59
End: 2024-05-10
Payer: COMMERCIAL

## 2024-05-10 ENCOUNTER — INFUSION THERAPY VISIT (OUTPATIENT)
Dept: INFUSION THERAPY | Facility: CLINIC | Age: 59
End: 2024-05-10
Attending: INTERNAL MEDICINE
Payer: COMMERCIAL

## 2024-05-10 VITALS — DIASTOLIC BLOOD PRESSURE: 72 MMHG | HEART RATE: 90 BPM | SYSTOLIC BLOOD PRESSURE: 122 MMHG

## 2024-05-10 DIAGNOSIS — E83.110 HEREDITARY HEMOCHROMATOSIS (H): Primary | ICD-10-CM

## 2024-05-10 LAB
FERRITIN SERPL-MCNC: 744 NG/ML (ref 31–409)
HCT VFR BLD AUTO: 39.5 % (ref 40–53)
HGB BLD-MCNC: 14.4 G/DL (ref 13.3–17.7)

## 2024-05-10 PROCEDURE — 82728 ASSAY OF FERRITIN: CPT | Performed by: INTERNAL MEDICINE

## 2024-05-10 PROCEDURE — 36415 COLL VENOUS BLD VENIPUNCTURE: CPT | Performed by: INTERNAL MEDICINE

## 2024-05-10 PROCEDURE — 85018 HEMOGLOBIN: CPT | Performed by: INTERNAL MEDICINE

## 2024-05-10 PROCEDURE — 99195 PHLEBOTOMY: CPT

## 2024-05-10 RX ORDER — HEPARIN SODIUM (PORCINE) LOCK FLUSH IV SOLN 100 UNIT/ML 100 UNIT/ML
5 SOLUTION INTRAVENOUS
Status: CANCELLED | OUTPATIENT
Start: 2024-05-10

## 2024-05-10 RX ORDER — HEPARIN SODIUM,PORCINE 10 UNIT/ML
5-20 VIAL (ML) INTRAVENOUS DAILY PRN
Status: CANCELLED | OUTPATIENT
Start: 2024-05-10

## 2024-05-10 NOTE — TELEPHONE ENCOUNTER
Pre assessment completed for upcoming procedure.      Procedure details:    Patient scheduled for Upper endoscopy (EGD) on 5/22/24.     Arrival time: 1230. Procedure time 1330     Facility location: [facility]; [address] [place] [address], [address] Floor, [place] [address]. Check in location: 5th Floor.     Sedation type: MAC    Designated  policy reviewed. Instructed to have someone stay 24 hours post procedure.       Chart review:     Diabetic? Yes. Diabetic medication HOLDING recommendations: Oral diabetic medications: Yes:  Metformin (glucophage): HOLD day of procedure.       Prep for procedure:     Reviewed procedure prep instructions.     Patient verbalized understanding and had no questions or concerns at this time.        Blessing Reese RN  Endoscopy Procedure Pre Assessment RN  [phone] option 4

## 2024-05-10 NOTE — PROGRESS NOTES
Infusion Nursing Note:  Demetrius Burton presents today for Therapeutic Phlebotomy.    Patient seen by provider today: No   present during visit today: Not Applicable.    Note: N/A.      Intravenous Access:  16 gauge   Treatment Conditions:  Results reviewed, labs MET treatment parameters, ok to proceed with treatment.      Post Infusion Assessment:  Pt tolerated phlebotomy without incident.      Discharge Plan:   Patient discharged in stable condition accompanied by: self.  Departure Mode: Ambulatory.      Blessing Prince RN  
no rashes.

## 2024-05-10 NOTE — TELEPHONE ENCOUNTER
"Response from [facility] anesthesia Leonardo Diaz CRNA.     \"OK to proceed if PLT > 50\"       ---------------------------------------------------------------------------------------    Prep instructions sent via [employer].     Blessing Joyce RN  Endoscopy Procedure Pre Assessment RN  [phone] option 4       "

## 2024-05-22 ENCOUNTER — ANESTHESIA EVENT (OUTPATIENT)
Dept: SURGERY | Facility: AMBULATORY SURGERY CENTER | Age: 59
End: 2024-05-22
Payer: COMMERCIAL

## 2024-05-22 ENCOUNTER — HOSPITAL ENCOUNTER (OUTPATIENT)
Facility: AMBULATORY SURGERY CENTER | Age: 59
Discharge: HOME | End: 2024-05-22
Attending: INTERNAL MEDICINE | Admitting: INTERNAL MEDICINE
Payer: COMMERCIAL

## 2024-05-22 ENCOUNTER — ANESTHESIA (OUTPATIENT)
Dept: SURGERY | Facility: AMBULATORY SURGERY CENTER | Age: 59
End: 2024-05-22
Payer: COMMERCIAL

## 2024-05-22 VITALS
TEMPERATURE: 98.2 F | OXYGEN SATURATION: 98 % | RESPIRATION RATE: 16 BRPM | SYSTOLIC BLOOD PRESSURE: 117 MMHG | DIASTOLIC BLOOD PRESSURE: 71 MMHG

## 2024-05-22 VITALS — HEART RATE: 84 BPM

## 2024-05-22 DIAGNOSIS — K74.60 CIRRHOSIS OF LIVER WITHOUT ASCITES, UNSPECIFIED HEPATIC CIRRHOSIS TYPE (H): Primary | ICD-10-CM

## 2024-05-22 DIAGNOSIS — R21 RASH: Primary | ICD-10-CM

## 2024-05-22 LAB
GLUCOSE BLDC GLUCOMTR-MCNC: 118 MG/DL (ref 70–99)
UPPER GI ENDOSCOPY: NORMAL

## 2024-05-22 PROCEDURE — 43244 EGD VARICES LIGATION: CPT | Performed by: STUDENT IN AN ORGANIZED HEALTH CARE EDUCATION/TRAINING PROGRAM

## 2024-05-22 PROCEDURE — 82962 GLUCOSE BLOOD TEST: CPT | Performed by: PATHOLOGY

## 2024-05-22 PROCEDURE — 43244 EGD VARICES LIGATION: CPT | Performed by: INTERNAL MEDICINE

## 2024-05-22 PROCEDURE — 43244 EGD VARICES LIGATION: CPT | Performed by: NURSE ANESTHETIST, CERTIFIED REGISTERED

## 2024-05-22 RX ORDER — ONDANSETRON 2 MG/ML
4 INJECTION INTRAMUSCULAR; INTRAVENOUS
Status: DISCONTINUED | OUTPATIENT
Start: 2024-05-22 | End: 2024-05-23 | Stop reason: HOSPADM

## 2024-05-22 RX ORDER — ONDANSETRON 4 MG/1
4 TABLET, ORALLY DISINTEGRATING ORAL EVERY 6 HOURS PRN
Status: CANCELLED | OUTPATIENT
Start: 2024-05-22

## 2024-05-22 RX ORDER — PROPOFOL 10 MG/ML
INJECTION, EMULSION INTRAVENOUS CONTINUOUS PRN
Status: DISCONTINUED | OUTPATIENT
Start: 2024-05-22 | End: 2024-05-22

## 2024-05-22 RX ORDER — LIDOCAINE HYDROCHLORIDE 20 MG/ML
INJECTION, SOLUTION INFILTRATION; PERINEURAL PRN
Status: DISCONTINUED | OUTPATIENT
Start: 2024-05-22 | End: 2024-05-22

## 2024-05-22 RX ORDER — SODIUM CHLORIDE, SODIUM LACTATE, POTASSIUM CHLORIDE, CALCIUM CHLORIDE 600; 310; 30; 20 MG/100ML; MG/100ML; MG/100ML; MG/100ML
INJECTION, SOLUTION INTRAVENOUS CONTINUOUS PRN
Status: DISCONTINUED | OUTPATIENT
Start: 2024-05-22 | End: 2024-05-22

## 2024-05-22 RX ORDER — NALOXONE HYDROCHLORIDE 0.4 MG/ML
0.4 INJECTION, SOLUTION INTRAMUSCULAR; INTRAVENOUS; SUBCUTANEOUS
Status: CANCELLED | OUTPATIENT
Start: 2024-05-22

## 2024-05-22 RX ORDER — PROPOFOL 10 MG/ML
INJECTION, EMULSION INTRAVENOUS PRN
Status: DISCONTINUED | OUTPATIENT
Start: 2024-05-22 | End: 2024-05-22

## 2024-05-22 RX ORDER — PROCHLORPERAZINE MALEATE 10 MG
10 TABLET ORAL EVERY 6 HOURS PRN
Status: CANCELLED | OUTPATIENT
Start: 2024-05-22

## 2024-05-22 RX ORDER — FLUMAZENIL 0.1 MG/ML
0.2 INJECTION, SOLUTION INTRAVENOUS
Status: CANCELLED | OUTPATIENT
Start: 2024-05-22 | End: 2024-05-23

## 2024-05-22 RX ORDER — ONDANSETRON 2 MG/ML
4 INJECTION INTRAMUSCULAR; INTRAVENOUS EVERY 6 HOURS PRN
Status: CANCELLED | OUTPATIENT
Start: 2024-05-22

## 2024-05-22 RX ORDER — LIDOCAINE 40 MG/G
CREAM TOPICAL
Status: DISCONTINUED | OUTPATIENT
Start: 2024-05-22 | End: 2024-05-23 | Stop reason: HOSPADM

## 2024-05-22 RX ORDER — NALOXONE HYDROCHLORIDE 0.4 MG/ML
0.2 INJECTION, SOLUTION INTRAMUSCULAR; INTRAVENOUS; SUBCUTANEOUS
Status: CANCELLED | OUTPATIENT
Start: 2024-05-22

## 2024-05-22 RX ORDER — ZOSTER VACCINE RECOMBINANT, ADJUVANTED 50 MCG/0.5
KIT INTRAMUSCULAR
COMMUNITY
Start: 2023-09-24

## 2024-05-22 RX ADMIN — SODIUM CHLORIDE, SODIUM LACTATE, POTASSIUM CHLORIDE, CALCIUM CHLORIDE: 600; 310; 30; 20 INJECTION, SOLUTION INTRAVENOUS at 13:02

## 2024-05-22 RX ADMIN — PROPOFOL 250 MCG/KG/MIN: 10 INJECTION, EMULSION INTRAVENOUS at 13:20

## 2024-05-22 RX ADMIN — PROPOFOL 60 MG: 10 INJECTION, EMULSION INTRAVENOUS at 13:20

## 2024-05-22 RX ADMIN — LIDOCAINE HYDROCHLORIDE 80 MG: 20 INJECTION, SOLUTION INFILTRATION; PERINEURAL at 13:20

## 2024-05-22 NOTE — ANESTHESIA PREPROCEDURE EVALUATION
Anesthesia Pre-Procedure Evaluation    Patient: Demetrius Burton   MRN: [id number] : 1965        Procedure : Procedure(s):  ESOPHAGOGASTRODUODENOSCOPY, WITH BANDING OF VARICES          Past Medical History:   Diagnosis Date    Cirrhosis of liver (H)     H63D heterozygote    Diabetes mellitus, type 2 (H)     Hyperlipidemia       Past Surgical History:   Procedure Laterality Date    APPENDECTOMY      COLONOSCOPY N/A 2021    Procedure: COLONOSCOPY;  Surgeon: Hipolito Alexander MD;  Location: [facility]    IR LIVER BIOPSY PERCUTANEOUS  2024    PERCUTANEOUS BIOPSY LIVER N/A 2024    Procedure: Percutaneous biopsy liver;  Surgeon: Alvarez Alexander MD;  Location: [facility] OR      No Known Allergies   Social History     Tobacco Use    Smoking status: Never     Passive exposure: Past    Smokeless tobacco: Never   Substance Use Topics    Alcohol use: Yes     Comment: rare      Wt Readings from Last 1 Encounters:   24 87.7 kg (193 lb 4.8 oz)           Physical Exam    Airway  airway exam normal      Mallampati: II   TM distance: > 3 FB   Neck ROM: full   Mouth opening: > 3 cm    Respiratory Devices and Support         Dental       (+) Minor Abnormalities - some fillings, tiny chips      Cardiovascular   cardiovascular exam normal          Pulmonary   pulmonary exam normal                OUTSIDE LABS:  CBC:   Lab Results   Component Value Date    WBC 2.9 (L) 2024    WBC 3.0 (L) 2024    HGB 14.4 05/10/2024    HGB 14.9 2024    HCT 39.5 (L) 05/10/2024    HCT 41.1 2024    PLT 55 (L) 2024    PLT 61 (L) 2024     BMP:   Lab Results   Component Value Date     2024     2024    POTASSIUM 4.5 2024    POTASSIUM 4.1 2024    CHLORIDE 109 (H) 2024    CHLORIDE 108 (H) 2024    CO2 25 2024    CO2 25 2024    BUN 14.0 2024    BUN 12.9 2024    CR 0.78 2024    CR 0.76 2024     (H) 2024      "(H) 03/27/2024     COAGS:   Lab Results   Component Value Date    PTT 36 08/21/2023    INR 1.24 (H) 03/27/2024     POC: No results found for: \"BGM\", \"HCG\", \"HCGS\"  HEPATIC:   Lab Results   Component Value Date    ALBUMIN 3.9 03/27/2024    PROTTOTAL 7.4 03/27/2024    ALT 71 (H) 03/27/2024    AST 66 (H) 03/27/2024    ALKPHOS 206 (H) 03/27/2024    BILITOTAL 1.0 03/27/2024     OTHER:   Lab Results   Component Value Date    A1C 7.6 (H) 03/27/2024    NAMRATA 9.2 03/27/2024    TSH 2.61 08/14/2023    SED 16 01/30/2024       Anesthesia Plan    ASA Status:  2    NPO Status:  NPO Appropriate    Anesthesia Type: MAC.     - Reason for MAC: straight local not clinically adequate   Induction: Intravenous, Propofol.   Maintenance: TIVA.        Consents    Anesthesia Plan(s) and associated risks, benefits, and realistic alternatives discussed. Questions answered and patient/representative(s) expressed understanding.     - Discussed: Risks, Benefits and Alternatives for BOTH SEDATION and the PROCEDURE were discussed     - Discussed with:  Patient      - Extended Intubation/Ventilatory Support Discussed: No.      - Patient is DNR/DNI Status: No     Use of blood products discussed: No .     Postoperative Care       PONV prophylaxis: Ondansetron (or other 5HT-3), Background Propofol Infusion     Comments:               Jatinder Pan MD    I have reviewed the pertinent notes and labs in the chart from the past 30 days and (re)examined the patient.  Any updates or changes from those notes are reflected in this note.              # DMII: A1C = 7.6 % (Ref range: <5.7 %) within past 6 months      "

## 2024-05-22 NOTE — DISCHARGE INSTRUCTIONS
Discharge Instructions after  Upper Endoscopy (EGD)    Activity and Diet  You were given medicine for pain. You may be dizzy or sleepy.  For 24 hours:   Do not drive or use heavy equipment.   Do not make important decisions.   Do not drink any alcohol.  ___ You may return to your regular diet.    Discomfort  You may have a sore throat for 2 to 3 days. It may help to:   Avoid hot liquids for 24 hours.   Use sore throat lozenges.   Gargle as needed with salt water up to 4 times a day. Mix 1 cup of warm water  with 1 teaspoon of salt. Do not swallow.  ___ Your esophagus was dilated (opened) or banded during the exam:   Drink only cool liquids for the rest of the day. Eat a soft diet for the next few days.   You may have a sore chest for 2 to 3 days.    You may take Tylenol (acetaminophen) for pain unless your doctor has told you not to.    Do not take aspirin or ibuprofen (Advil, Motrin) or other NSAIDS  (anti-inflammatory drugs) for ___ days.    Follow-up  ___ We took small tissue samples for study. If you do not have a follow-up visit scheduled,  call your provider s office in 2 weeks for the results.    Other instructions________________________________________________________    When to call us:  Problems are rare. Call right away if you have:   Unusual throat pain or trouble swallowing   Unusual pain in belly or chest that is not relieved by belching or passing air   Black stools (tar-like looking bowel movement)   Temperature above 100.6  F. (37.5  C).    If you vomit blood or have severe pain, go to an emergency room.    If you have questions, call:  Monday to Friday, 8 a.m. to 4:30 p.m.: Central Scheduling Department:[phone]    After hours: Hospital: [phone] (Ask for the GI fellow on call)

## 2024-05-22 NOTE — ANESTHESIA CARE TRANSFER NOTE
Patient: Demetrius Burton    Procedure: Procedure(s):  ESOPHAGOGASTRODUODENOSCOPY, WITH BANDING OF VARICES       Diagnosis: Cirrhosis of liver without ascites, unspecified hepatic cirrhosis type (H) [K74.60]  Diagnosis Additional Information: No value filed.    Anesthesia Type:   No value filed.     Note:    Oropharynx: spontaneously breathing  Level of Consciousness: drowsy  Oxygen Supplementation: room air    Independent Airway: airway patency satisfactory and stable  Dentition: dentition unchanged  Vital Signs Stable: post-procedure vital signs reviewed and stable  Report to RN Given: handoff report given  Patient transferred to: Phase II    Handoff Report: Identifed the Patient, Identified the Reponsible Provider, Reviewed the pertinent medical history, Discussed the surgical course, Reviewed Intra-OP anesthesia mangement and issues during anesthesia, Set expectations for post-procedure period and Allowed opportunity for questions and acknowledgement of understanding  Vitals:  Vitals Value Taken Time   /81 05/22/24 1335   Temp 36.7  C (98  F) 05/22/24 1335   Pulse     Resp 16 05/22/24 1335   SpO2 94 % 05/22/24 1335       Electronically Signed By: EDUARDO Su CRNA  May 22, 2024  1:37 PM

## 2024-05-22 NOTE — H&P
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)       Past Surgical History:    Past Surgical History:   Procedure Laterality Date    APPENDECTOMY      COLONOSCOPY N/A 05/21/2021    Procedure: COLONOSCOPY;  Surgeon: Hipolito Alexander MD;  Location: [facility]    IR LIVER BIOPSY PERCUTANEOUS  1/5/2024    PERCUTANEOUS BIOPSY LIVER N/A 1/5/2024    Procedure: Percutaneous biopsy liver;  Surgeon: Alvarez Alexander MD;  Location: [facility]       Past Medical History:   Past Medical History:   Diagnosis Date    Cirrhosis of liver (H)     H63D heterozygote    Diabetes mellitus, type 2 (H)     Hyperlipidemia        Social History:   Social History     Tobacco Use    Smoking status: Never     Passive exposure: Past    Smokeless tobacco: Never   Substance Use Topics    Alcohol use: Yes     Comment: rare       Family History:   Family History   Problem Relation Age of Onset    Diabetes Mother     Heart Disease Mother         stent placement.    Morbid Obesity Mother         bypass    Valvular heart disease Father     Diabetes Brother     Liver Disease No family hx of        Allergies: No Known Allergies    Active Medications:   Current Outpatient Medications   Medication Sig Dispense Refill    gabapentin (NEURONTIN) 300 MG capsule Take 1 capsule (300 mg) by mouth nightly as needed for neuropathic pain 30 capsule 3    metFORMIN (GLUCOPHAGE XR) 500 MG 24 hr tablet Take 1 tablet (500 mg) by mouth daily (with dinner) 90 tablet 3    SHINGRIX injection       Vitamin E 90 MG (200 UNIT) capsule Take 200 Units by mouth daily         Systemic Review:   CONSTITUTIONAL: NEGATIVE for fever, chills, change in weight  ENT/MOUTH:  NEGATIVE for ear, mouth and throat problems  RESP: NEGATIVE for significant cough or SOB  CV: NEGATIVE for chest pain, palpitations or peripheral edema    Physical Examination:   Vital Signs: /82   Temp 98.4  F (36.9  C) (Temporal)   Resp 16   SpO2 100%   GENERAL: healthy, alert and no distress  NECK: no adenopathy, no asymmetry, masses, or scars  RESP: lungs clear to auscultation - no rales, rhonchi or wheezes  CV: regular rate and rhythm, normal S1 S2, no S3 or S4, no murmur, click or rub, no peripheral edema and peripheral pulses strong  ABDOMEN: soft, nontender, no hepatosplenomegaly, no masses and bowel sounds normal  MS: no gross musculoskeletal defects noted, no edema    Plan: Appropriate to proceed as scheduled.      Manju Lambert MD  5/22/2024    PCP:  Raj Almazan

## 2024-05-22 NOTE — ANESTHESIA POSTPROCEDURE EVALUATION
Patient: Demetrius Burton    Procedure: Procedure(s):  ESOPHAGOGASTRODUODENOSCOPY, WITH BANDING OF VARICES       Anesthesia Type:  MAC    Note:  Disposition: Outpatient   Postop Pain Control: Uneventful            Sign Out: Well controlled pain   PONV: No   Neuro/Psych: Uneventful            Sign Out: Acceptable/Baseline neuro status   Airway/Respiratory: Uneventful            Sign Out: Acceptable/Baseline resp. status   CV/Hemodynamics: Uneventful            Sign Out: Acceptable CV status; No obvious hypovolemia; No obvious fluid overload   Other NRE:    DID A NON-ROUTINE EVENT OCCUR?            Last vitals:  Vitals Value Taken Time   /71 05/22/24 1410   Temp 36.8  C (98.2  F) 05/22/24 1410   Pulse     Resp 16 05/22/24 1410   SpO2 98 % 05/22/24 1410       Electronically Signed By: Jatinder Pan MD  May 22, 2024  2:35 PM

## 2024-05-31 ENCOUNTER — INFUSION THERAPY VISIT (OUTPATIENT)
Dept: INFUSION THERAPY | Facility: CLINIC | Age: 59
End: 2024-05-31
Attending: INTERNAL MEDICINE
Payer: COMMERCIAL

## 2024-05-31 ENCOUNTER — APPOINTMENT (OUTPATIENT)
Dept: LAB | Facility: CLINIC | Age: 59
End: 2024-05-31
Payer: COMMERCIAL

## 2024-05-31 VITALS — SYSTOLIC BLOOD PRESSURE: 120 MMHG | DIASTOLIC BLOOD PRESSURE: 79 MMHG | RESPIRATION RATE: 16 BRPM | HEART RATE: 75 BPM

## 2024-05-31 DIAGNOSIS — E83.110 HEREDITARY HEMOCHROMATOSIS (H): Primary | ICD-10-CM

## 2024-05-31 LAB
FERRITIN SERPL-MCNC: 837 NG/ML (ref 31–409)
HCT VFR BLD AUTO: 39.3 % (ref 40–53)
HGB BLD-MCNC: 14.3 G/DL (ref 13.3–17.7)

## 2024-05-31 PROCEDURE — 258N000003 HC RX IP 258 OP 636: Performed by: INTERNAL MEDICINE

## 2024-05-31 PROCEDURE — 82728 ASSAY OF FERRITIN: CPT | Performed by: INTERNAL MEDICINE

## 2024-05-31 PROCEDURE — 36415 COLL VENOUS BLD VENIPUNCTURE: CPT | Performed by: INTERNAL MEDICINE

## 2024-05-31 PROCEDURE — 99195 PHLEBOTOMY: CPT

## 2024-05-31 PROCEDURE — 85014 HEMATOCRIT: CPT | Performed by: INTERNAL MEDICINE

## 2024-05-31 RX ORDER — HEPARIN SODIUM,PORCINE 10 UNIT/ML
5-20 VIAL (ML) INTRAVENOUS DAILY PRN
Status: CANCELLED | OUTPATIENT
Start: 2024-05-31

## 2024-05-31 RX ORDER — HEPARIN SODIUM (PORCINE) LOCK FLUSH IV SOLN 100 UNIT/ML 100 UNIT/ML
5 SOLUTION INTRAVENOUS
Status: CANCELLED | OUTPATIENT
Start: 2024-05-31

## 2024-05-31 RX ADMIN — SODIUM CHLORIDE 500 ML: 9 INJECTION, SOLUTION INTRAVENOUS at 13:53

## 2024-05-31 NOTE — PROGRESS NOTES
Infusion Nursing Note:  Demetrius Burton presents today for phlebotomy.    Patient seen by provider today: No   present during visit today: Not Applicable.    Note: 430cc blood removed from L AC via 18G IV. Pt has hx of near syncope with phlebotomy in the past so blood removal is done with an IV in case pt needs fluids at any point of procedure.  At 430cc pt began to feel dizzy, and lightheaded. Hearing became muffled. Phlebotomy stopped and pt was reclined flat. NS hooked up to IV and 500cc NS given.    Intravenous Access:  Peripheral IV placed.    Treatment Conditions:  Lab Results   Component Value Date    HGB 14.3 05/31/2024    WBC 2.9 (L) 03/27/2024    ANEUTAUTO 1.7 03/04/2024    PLT 55 (L) 03/27/2024        Results reviewed, labs MET treatment parameters, ok to proceed with treatment.    Hematocrit 39.3    Post Infusion Assessment:  Patient tolerated removal without incident.  Blood return noted pre and post infusion.  Site patent and intact, free from redness, edema or discomfort.  No evidence of extravasations.  Access discontinued per protocol.       Discharge Plan:   Discharge instructions reviewed with: Patient.  Patient and/or family verbalized understanding of discharge instructions and all questions answered.  Patient discharged in stable condition accompanied by: self.  Departure Mode: Ambulatory.      Cynthia Villarreal RN

## 2024-06-05 ENCOUNTER — TELEPHONE (OUTPATIENT)
Dept: GASTROENTEROLOGY | Facility: CLINIC | Age: 59
End: 2024-06-05
Payer: COMMERCIAL

## 2024-06-05 NOTE — TELEPHONE ENCOUNTER
"Endoscopy Scheduling Screen    Have you had a positive Covid test in the last 14 days?  No    What is your communication preference for Instructions and/or Bowel Prep?   MyChart    What insurance is in the chart?  Other:  [facility]    Ordering/Referring Provider: Manju Lambert MD     (If ordering provider performs procedure, schedule with ordering provider unless otherwise instructed. )    BMI: Estimated body mass index is 24.81 kg/m  as calculated from the following:    Height as of 3/27/24: 1.88 m (6' 2.02\").    Weight as of 3/27/24: 87.7 kg (193 lb 4.8 oz).     Sedation Ordered  MAC/deep sedation.   BMI<= 45 45 < BMI <= 48 48 < BMI < = 50  BMI > 50   No Restrictions No [facility]  No [facility]  [facility] with exceptions Hospital Only OR Only       Do you have a history of malignant hyperthermia?  No    (Females) Are you currently pregnant?   No     Have you been diagnosed or told you have pulmonary hypertension?   No    Do you have an LVAD?  No    Have you been told you have moderate to severe sleep apnea?  No    Have you been told you have COPD, asthma, or any other lung disease?  No    Do you have any heart conditions?  No     Have you ever had or are you waiting for an organ transplant?  No. Continue scheduling, no site restrictions.    Have you had a stroke or transient ischemic attack (TIA aka \"mini stroke\" in the last 6 months?   No    Have you been diagnosed with or been told you have cirrhosis of the liver?   No    Are you currently on dialysis?   No    Do you need assistance transferring?   No    BMI: Estimated body mass index is 24.81 kg/m  as calculated from the following:    Height as of 3/27/24: 1.88 m (6' 2.02\").    Weight as of 3/27/24: 87.7 kg (193 lb 4.8 oz).     Is patients BMI > 40 and scheduling location [facility]?  No    Do you take an injectable medication for weight loss or diabetes (excluding insulin)?  No    Do you take the medication Naltrexone?  No    Do you take blood thinners?  No "       Prep   Are you currently on dialysis or do you have chronic kidney disease?  No    Do you have a diagnosis of diabetes?  Yes ([facility] Prep)    Do you have a diagnosis of cystic fibrosis (CF)?  No    On a regular basis do you go 3 -5 days between bowel movements?  No    BMI > 40?  No    Preferred Pharmacy:      [facility] [place] [address] [address] [place] [address] [address] [facility] [address]  Phone: [phone] Fax: [phone]      Final Scheduling Details     Procedure scheduled  Colonoscopy    Surgeon:  BRADY     Date of procedure:  10/7     Pre-OP / PAC:   No - Not required for this site.    Location  [facility] - [facility] - Patient preference.    Sedation   MAC/Deep Sedation - Per order.      Patient Reminders:   You will receive a call from a Nurse to review instructions and health history.  This assessment must be completed prior to your procedure.  Failure to complete the Nurse assessment may result in the procedure being cancelled.      On the day of your procedure, please designate an adult(s) who can drive you home stay with you for the next 24 hours. The medicines used in the exam will make you sleepy. You will not be able to drive.      You cannot take public transportation, ride share services, or non-medical taxi service without a responsible caregiver.  Medical transport services are allowed with the requirement that a responsible caregiver will receive you at your destination.  We require that drivers and caregivers are confirmed prior to your procedure.

## 2024-06-13 NOTE — TELEPHONE ENCOUNTER
Caller: Demetrius Burton      Reason for Reschedule/Cancellation   (please be detailed, any staff messages or encounters to note?): Reschedule patient to be scoped by Hep provider      Prior to reschedule please review:  Ordering Provider:  Manju Lambert MD   Sedation Determined: MAC  Does patient have any ASC Exclusions, please identify?: NO      Notes on Cancelled Procedure:  Procedure: Upper Endoscopy [EGD]   Date: 10/7  Location: [facility]; [address] [place] [address], [address] [place] [address]  Surgeon: BRADY      Rescheduled: Yes,   Procedure: Upper Endoscopy [EGD]    Date: 10/10   Location: [facility]; [address] [place] [address], [address] [place] [address]   Surgeon: TUYET   Sedation Level Scheduled  MAC ,  Reason for Sedation Level PER ORDER   Instructions updated and sent: GARRETT     Does patient need PAC or Pre -Op Rescheduled? : NO       Did you cancel or rescheduled an EUS procedure? No.

## 2024-06-16 ENCOUNTER — HEALTH MAINTENANCE LETTER (OUTPATIENT)
Age: 59
End: 2024-06-16

## 2024-06-20 ENCOUNTER — TELEPHONE (OUTPATIENT)
Dept: CONSULT | Facility: CLINIC | Age: 59
End: 2024-06-20
Payer: COMMERCIAL

## 2024-06-20 DIAGNOSIS — H90.3 SENSORINEURAL HEARING LOSS (SNHL) OF BOTH EARS: Primary | ICD-10-CM

## 2024-06-20 NOTE — LETTER
6/20/2024      RE: Demetrius Burton  [address] [facility] [address]       To whom it may concern,      You are receiving this letter because your relative has recently been found to have an uncertain genetic variant (also called a mutation) in the [id number] gene. Your relative's testing was pursued to look for the underlying genetic cause of the hearing loss in your family. We are not sure if the genetic change found in your relative is harmful and so we are hoping to determine who else in the family has the same genetic change. If we find that the people with hearing loss in your family have this genetic change while those without hearing loss do not have this change, we would be more suspicious that this finding is causing the hearing loss in the family. This would allow for testing younger family members without hearing loss to determine if they are at risk for developing hearing loss when they are older.    The laboratory your relative had their genetic testing through ([facility]) is offering no charge genetic testing of this specific genetic variant. If you are willing, it would be helpful to test you for this variant. This can be done on a cheek swab kit through the mail at no charge to you. We would just have to have a ~10 minute phone conversation to answer your questions and obtain official consent from you for the testing.    Please let me know if you are interested by either calling [phone] or emailing me at [email or web address].    All the best,    Terese Barrett MS, [facility]  Genetic Counseling  [facility]  Email: [email or web address]  Phone: [phone]  Fax: [phone]

## 2024-06-20 NOTE — LETTER
6/20/2024      RE: Demetrius FARIHA Micheal  [address] [facility] [address]     Dear Demetrius,    Thank you for the opportunity to participate in your care at [facility]. Please let the following serve as a summary of our conversation regarding your recent genetic testing results. A copy of those results is also enclosed.     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    Interpretation  Demetrius's genetic testing identified a variants of uncertain significance (VUS) in the OYH49G9 gene.  A VUS means a change in the gene was found but there is not enough data or information yet to know if that change is harmful to the gene and causes a particular condition (pathogenic) or if is harmless (benign) change.     Harmful variant(s) in the HAF75Z0 gene can cause 2 genetic conditions: Stickler syndrome type 2 and Kalen syndrome.   Stickler syndrome is a connective tissue disorder characterized by a distinctive facial appearance, eye abnormalities, hearing loss, and joint problems. Ocular findings include myopia, cataract, and retinal detachment. Joint findings can include hyper-flexibility, early-onset arthritis, and scoliosis. Facial findings include micrognathia, cleft palate, and midface hypoplasia. There is great variability in Stickler syndrome in both which body systems are affected and the severity of those features.  Kalen syndrome is similar to Stickler syndrome but typically has severe mid face hypoplasia, high myopia, early onset cataracts, progressive sensorineural hearing loss, and other skeletal concerns.  Stickler and Kalen syndromes are considered autosomal dominant, which means an individual needs a single likely harmful variant in the gene in order to be affected. There is some evidence  "that having two harmful changes in the LLM27Y7 can lead to autosomal recessive fibrochondrogenesis (a severe skeletal dysplasia).    There are reports of individuals with nonsyndromic hearing loss due to EAH56Y2 variants. Nonsyndromic means \"with no other features\".     We discussed the ways this fits and does not fit with Demetrius's personal and family history. Autosomal dominant inheritance certainly fits the pattern in Demetrius's family, as we see multiple affected generations. I enquired as to the ocular history in Demetrius and his family; he shared that there are no significant vision concerns in the family that he is aware of, though some relatives use \"cheaters\"/ reading glasses. Demetrius affirmed there is no family history of cleft lip or palate. We discussed the short stature that can be seen in Stickler syndrome and how this is not apparent in Demetrius's family (he himself is 6' 2\"). However, given the variability in this condition, and the reports of nonsyndromic hearing loss with this gene, it is possible this variant is harmful and causing the hearing loss in the family.    The lab ([facility]) is offering no charge testing of relatives to better understand if this variant is tracking with the hearing loss in the family. Demetrius indicated that his father (with hearing loss) and brother (without hearing loss) are in [place] and may be willing to participate. I will send a brief family letter introducing this and Demetrius can share that with his relatives; if interested, they can contact me directly to coordinate testing.    Other findings:  Demetrius's genetic testing identified variants of uncertain significance (VUS) in 2 other genes: BSND and CLDN14. Both of these genes are associated with autosomal recessive conditions. Again, autosomal recessive means an individual needs two likely pathogenic/pathogenic variants, one on each copy of the gene, in order to be affected with the condition. Due to the fact that only one variant " was identified in each of these recessive genes, and the fact that they are currently classified as a VUS, means these variants are not contributing to Demetrius's symptoms and he would not be considered a carrier for these conditions at this time.  If the lab updates the classification as they gather more information, they will send us the update and we will notify Demetrius.       Plan  I will send a Family letter (by email) to send to siblings/father about no charge relative testing for variant    Please do not hesitate to reach out with any additional questions or concerns.    Terese Barrett MS, [facility]  Genetic Counseling  [facility]  Email: [email or web address]  Phone: [phone]  Fax: [phone]

## 2024-06-20 NOTE — TELEPHONE ENCOUNTER
"June 20, 2024    I spoke with Demetrius on the phone to discuss his recent genetic testing.    Reason for Testing  Personal and family history of hearing loss    Testing Ordered  Hearing Loss Panel (including mitochondrial genome) at [facility]    Result  UNCERTAIN - [id number] c.2913 A>G p.(P971=), heterozygous, variant of uncertain significance.        Interpretation  Demetrius's genetic testing identified a variants of uncertain significance (VUS) in the VIB38C6 gene.  A VUS means a change in the gene was found but there is not enough data or information yet to know if that change is harmful to the gene and causes a particular condition (pathogenic) or if is harmless (benign) change.     Harmful variant(s) in the FJT30N3 gene can cause 2 genetic conditions: Stickler syndrome type 2 and Kalen syndrome.   Stickler syndrome is a connective tissue disorder characterized by a distinctive facial appearance, eye abnormalities, hearing loss, and joint problems. Ocular findings include myopia, cataract, and retinal detachment. Joint findings can include hyper-flexibility, early-onset arthritis, and scoliosis. Facial findings include micrognathia, cleft palate, and midface hypoplasia. There is great variability in Stickler syndrome in both which body systems are affected and the severity of those features.  Kalen syndrome is similar to Stickler syndrome but typically has severe mid face hypoplasia, high myopia, early onset cataracts, progressive sensorineural hearing loss, and other skeletal concerns.  Stickler and Kalen syndromes are considered autosomal dominant, which means an individual needs a single likely harmful variant in the gene in order to be affected. There is some evidence that having two harmful changes in the ETE80B0 can lead to autosomal recessive fibrochondrogenesis (a severe skeletal dysplasia).    There are reports of individuals with nonsyndromic hearing loss due to TUO82B2 variants. Nonsyndromic means \"with " "no other features\".     We discussed the ways this fits and does not fit with Demetrius's personal and family history. Autosomal dominant inheritance certainly fits the pattern in Demetrius's family, as we see multiple affected generations. I enquired as to the ocular history in Demetrius and his family; he shared that there are no significant vision concerns in the family that he is aware of, though some relatives use \"cheaters\"/ reading glasses. Demetrius affirmed there is no family history of cleft lip or palate. We discussed the short stature that can be seen in Stickler syndrome and how this is not apparent in Demetrius's family (he himself is 6' 2\"). However, given the variability in this condition, and the reports of nonsyndromic hearing loss with this gene, it is possible this variant is harmful and causing the hearing loss in the family.    The lab ([facility]) is offering no charge testing of relatives to better understand if this variant is tracking with the hearing loss in the family. Demetrius indicated that his father (with hearing loss) and brother (without hearing loss) are in [place] and may be willing to participate. I will send a brief family letter introducing this and Demetrius can share that with his relatives; if interested, they can contact me directly to coordinate testing.    Other findings:  Demetrius's genetic testing identified variants of uncertain significance (VUS) in 2 other genes: BSND and CLDN14. Both of these genes are associated with autosomal recessive conditions. Again, autosomal recessive means an individual needs two likely pathogenic/pathogenic variants, one on each copy of the gene, in order to be affected with the condition. Due to the fact that only one variant was identified in each of these recessive genes, and the fact that they are currently classified as a VUS, means these variants are not contributing to Demetrius's symptoms and he would not be considered a carrier for these conditions at this time.  If " the lab updates the classification as they gather more information, they will send us the update and we will notify Demetrius.       Plan  I will send Demetrius a Family letter (by email) to send to siblings/father about no charge relative testing for variant  I will also provide a Results letter  for Demetrius's reference and a copy of his test report    Demetrius is encouraged to reach out with any additional questions or concerns.    Terese Barrett MS, [facility]  Genetic Counseling  [facility]  Pager: [phone]  Phone: [phone]  Fax: [phone]

## 2024-06-21 ENCOUNTER — INFUSION THERAPY VISIT (OUTPATIENT)
Dept: INFUSION THERAPY | Facility: CLINIC | Age: 59
End: 2024-06-21
Attending: INTERNAL MEDICINE
Payer: COMMERCIAL

## 2024-06-21 ENCOUNTER — LAB (OUTPATIENT)
Dept: LAB | Facility: CLINIC | Age: 59
End: 2024-06-21
Payer: COMMERCIAL

## 2024-06-21 VITALS
SYSTOLIC BLOOD PRESSURE: 116 MMHG | DIASTOLIC BLOOD PRESSURE: 73 MMHG | OXYGEN SATURATION: 100 % | RESPIRATION RATE: 16 BRPM | HEART RATE: 68 BPM | TEMPERATURE: 98 F

## 2024-06-21 DIAGNOSIS — E83.110 HEREDITARY HEMOCHROMATOSIS (H): Primary | ICD-10-CM

## 2024-06-21 LAB
FERRITIN SERPL-MCNC: 642 NG/ML (ref 31–409)
HCT VFR BLD AUTO: 39.8 % (ref 40–53)
HGB BLD-MCNC: 14.3 G/DL (ref 13.3–17.7)

## 2024-06-21 PROCEDURE — 83615 LACTATE (LD) (LDH) ENZYME: CPT

## 2024-06-21 PROCEDURE — 99195 PHLEBOTOMY: CPT

## 2024-06-21 PROCEDURE — 83540 ASSAY OF IRON: CPT

## 2024-06-21 PROCEDURE — 36415 COLL VENOUS BLD VENIPUNCTURE: CPT | Performed by: INTERNAL MEDICINE

## 2024-06-21 PROCEDURE — 82728 ASSAY OF FERRITIN: CPT | Performed by: INTERNAL MEDICINE

## 2024-06-21 PROCEDURE — 85014 HEMATOCRIT: CPT | Performed by: INTERNAL MEDICINE

## 2024-06-21 PROCEDURE — 258N000003 HC RX IP 258 OP 636: Mod: JZ | Performed by: INTERNAL MEDICINE

## 2024-06-21 PROCEDURE — 82565 ASSAY OF CREATININE: CPT

## 2024-06-21 PROCEDURE — 83550 IRON BINDING TEST: CPT

## 2024-06-21 PROCEDURE — 86140 C-REACTIVE PROTEIN: CPT

## 2024-06-21 RX ORDER — HEPARIN SODIUM,PORCINE 10 UNIT/ML
5-20 VIAL (ML) INTRAVENOUS DAILY PRN
Status: CANCELLED | OUTPATIENT
Start: 2024-06-21

## 2024-06-21 RX ORDER — HEPARIN SODIUM (PORCINE) LOCK FLUSH IV SOLN 100 UNIT/ML 100 UNIT/ML
5 SOLUTION INTRAVENOUS
Status: CANCELLED | OUTPATIENT
Start: 2024-06-21

## 2024-06-21 RX ADMIN — SODIUM CHLORIDE 250 ML: 9 INJECTION, SOLUTION INTRAVENOUS at 13:32

## 2024-06-21 NOTE — PROGRESS NOTES
Infusion Nursing Note:  Demetrius FARIHA Connellbrionna presents today for Phlebotomy.    Patient seen by provider today: No   present during visit today: Not Applicable.    Note: N/A.      Intravenous Access:  Peripheral IV placed.    Treatment Conditions:  Results reviewed, labs MET treatment parameters, ok to proceed with treatment.      Post Infusion Assessment:  Site patent and intact, free from redness, edema or discomfort.  No evidence of extravasations.  Access discontinued per protocol.     Phleb started at 1317 and ended at 1331, 500 mL of whole blood taken from patient and discarded per unit policy.       Discharge Plan:   Discharge instructions reviewed with: Patient.  Patient and/or family verbalized understanding of discharge instructions and all questions answered.  Patient discharged in stable condition accompanied by: self.  Departure Mode: Ambulatory.      Tammi Drake RN

## 2024-06-22 DIAGNOSIS — E83.110 HEREDITARY HEMOCHROMATOSIS (H): Primary | ICD-10-CM

## 2024-06-22 LAB
ALBUMIN SERPL BCG-MCNC: 3.9 G/DL (ref 3.5–5.2)
ALP SERPL-CCNC: 204 U/L (ref 40–150)
ALT SERPL W P-5'-P-CCNC: 63 U/L (ref 0–70)
ANION GAP SERPL CALCULATED.3IONS-SCNC: 12 MMOL/L (ref 7–15)
AST SERPL W P-5'-P-CCNC: 63 U/L (ref 0–45)
BILIRUB SERPL-MCNC: 1.2 MG/DL
BUN SERPL-MCNC: 14.2 MG/DL (ref 6–20)
CALCIUM SERPL-MCNC: 9.2 MG/DL (ref 8.6–10)
CHLORIDE SERPL-SCNC: 110 MMOL/L (ref 98–107)
CREAT SERPL-MCNC: 0.7 MG/DL (ref 0.67–1.17)
CRP SERPL-MCNC: <3 MG/L
DEPRECATED HCO3 PLAS-SCNC: 19 MMOL/L (ref 22–29)
EGFRCR SERPLBLD CKD-EPI 2021: >90 ML/MIN/1.73M2
GLUCOSE SERPL-MCNC: 200 MG/DL (ref 70–99)
IRON BINDING CAPACITY (ROCHE): 264 UG/DL (ref 240–430)
IRON SATN MFR SERPL: 44 % (ref 15–46)
IRON SERPL-MCNC: 115 UG/DL (ref 61–157)
LDH SERPL L TO P-CCNC: 230 U/L (ref 0–250)
POTASSIUM SERPL-SCNC: 4.1 MMOL/L (ref 3.4–5.3)
PROT SERPL-MCNC: 7.4 G/DL (ref 6.4–8.3)
SODIUM SERPL-SCNC: 141 MMOL/L (ref 135–145)

## 2024-06-22 NOTE — PROGRESS NOTES
Hematology/Oncology Consult Note    Demetrius Burton MRN# [id number]   Age: 59 year old YOB: 1965          Reason for Consult:     Iron overload        Assessment and Plan:   Demetrius Burton is a 59 year old     Problem list:   #Liver Cirrhosis  # Thrombocytopenia  # Iron Overload, hemosiderosis of hepatocyte  # HFE H63D Heterozygous  #Alpha 1 antitrypsin S heterozygosity  # Hearing loss, family hx  #Type  2DM  # Peripheral neuropathy  #COVID infection x 2  # Weight loss  # Elevated organic Arsenic  #UNCERTAIN - [id number] c.2913 A>G p.(P971=), heterozygous, variant of uncertain significance.   Demetrius seems to be doing a little bit better in terms of his neuropathy after 5 phlebotomies.  His ferritin has come down nicely and I would aim for ferritin for this individual with fibrotic liver disease somewhere in the range of 100-200.  I am glad he saw the genetic counselor and he does not have a mutation that explains his hearing loss.  It is unclear what the significance of this genetic variant is and further exploration and his family may be forthcoming.  It did not seem that he had other significant genes that might explain why he has so much iron in his liver.  He has decreased the use of iron cookware and eating less red meat and does not have much alcohol.  I would like to continue the phlebotomies every 3 weeks and see him again in November.  He will see Dr. Salvador later this summer as well as have an endoscopy done.    Summary of Recommendations:  Phlebotomy  every 2 to 3 weeks for hematocrit>33% at [place] to remove iron which may decrease inflammation in his liver.   Avoid ASA  Good diabetes control, Ophtho exam  Vitamin E 200 units a day as an antioxidant  RTC in November 2024  Thank you for involving us in the care of this patient.   I spent a total of 30 minutes face to face with Demetrius Burton during today's office visit. Over 50% of this time was spent counseling the patient and  coordinating the care regarding iron overload and cirrhoisis and 10minutes preparing to see the patient and  care coordination   .[id number]  Kishor Andrea MD  [phone]          History of Present Illness:   History obtained from chart review and confirmed with patient.    Demetrius Burton is a 59 year old who was in good health until earlier this year when he was diagnosed with diabetes in the setting of a 30 pound unintentional weight loss.  He was started on metformin and jardiance and his hemoglobin A1c dropped from 9.5% to 5.9%.  The jardiance was discontinued. He also has noticed some neuropathy in his feet with pain to touch in his toes and top of his feet. He has had COVID-19 twice in the last couple of years.  His first diagnosis was in February 2022  which lasted  a month treated with antibiotics and steroids.  He then got COVID-19 again in September 2023.    He denies any history of jaundice, abdominal swelling, lower extremity swelling, slowness of thought or confusion.  Denies any diarrhea or constipation, melena or hematochezia.  He denies any nausea or vomiting in the setting of his unintentional weight loss.He denies any known family history of liver disease.  He has several family members with diabetes.  He denies any over-the-counter medications or supplements except for a multivitamin.  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 Throughout his life he has not had issues with alcohol overuse; he drinks about 1 alcohol-containing beverage per month.He was seen by Dr Lambert in Hepatology. CRISTINA , anti actin and hepatitis serologies were negative. He did have an C23MIJR heterozygosity and an alpha 1 anti trypsin S heterozygosity. No tylenol abuse.    INTERVAL HISTORY.   Less pain in feet and toes.He is taking Vit E 200u/d and not cooking with iron skillets. He has had 5 phlebotomies at [place] . Last week had phlebotomy and ferritin was 642.No significant alcohol.Was see by  Genetics and foun to have a col11a heterozyosity , an uncertain genetic variant.Not [place]  COL4A3 and/or COL4A4  despite his hearing loss     A comprehensive ROS was performed with the patient and was found to be negative with the exception of that noted in the HPI above.  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  PSYCH Mood ok  SKIN He sunburns easily no skin cancer  ID Had Covid in 2022   with pneumonia with difficulty breathing on antibiotics and steroids lasted a month Had COVID in Oct 2023 lasted a few days no paxlovid  HEME No anemia, no DVT Eats meat was eating venison, not using iron pans less Vit C No blood transfusions No bleeding or petechiae He has had nose bleeding   No UTI occ nocturia         Past Medical History:     Past Medical History:   Diagnosis Date    Cirrhosis of liver (H)     H63D heterozygote    Diabetes mellitus, type 2 (H)     Hyperlipidemia             Past Surgical History:     Past Surgical History:   Procedure Laterality Date    APPENDECTOMY      COLONOSCOPY N/A 05/21/2021    Procedure: COLONOSCOPY;  Surgeon: Hipolito Alexander MD;  Location: [facility]    IR LIVER BIOPSY PERCUTANEOUS  1/5/2024    PERCUTANEOUS BIOPSY LIVER N/A 1/5/2024    Procedure: Percutaneous biopsy liver;  Surgeon: Alvarez Alexander MD;  [place] [facility] [place]   Had small bowel resection with appendectomy 25 years ago         Social History:   Works in [employer] has  own company His wife Francheska also works for [employer]  Social History     Socioeconomic History    Marital status:      Spouse name: Not on file    Number of children: Not on file    Years of education: Not on file    Highest education level: Not on file   Occupational History    Not on file   Tobacco Use    Smoking status: Never     Passive exposure: Past    Smokeless tobacco: Never   Vaping Use    Vaping status: Never Used   Substance and Sexual Activity    Alcohol use: Yes     Comment: rare    Drug use: No    Sexual activity: Not on file   Other Topics Concern    Parent/sibling w/ CABG, MI or angioplasty before 65F 55M? No   Social History Narrative    Not on file     Social Determinants of Health     Financial Resource Strain: Not on file   Food Insecurity: Not on file   Transportation Needs: Not on file   Physical Activity: Not on file   Stress: Not on file   Social Connections: Unknown (1/3/2024)    [facility]    Social Connections     Frequency of Communication with Friends and Family: Not on file   Interpersonal Safety: Not on file   Housing Stability: Not on file            Family History:   [place] ancestry  Family History   Problem Relation Age of Onset    Diabetes Mother     Heart Disease Mother         stent placement.    Morbid Obesity Mother         bypass    Valvular heart disease Father     Diabetes Brother     Liver Disease No family hx of    Has 2 children 29 Fabricio and Ric 27 both healthy  Younger brother has DM and skin cancer  No hx of iron overload  Father had nerve deafness and older brother had hearing aids in his 20s. No kidney issues       Allergies:   No Known Allergies         Medications:   (Not in a hospital admission)      PHYSICAL EXAMINATION:    By the video, he is an alert gentleman, verbal, in no acute distress.     EYES:  Grossly normal to inspection, no discharge, erythema or icterus.    SKIN:  Visible skin is clear.  No significant rash or abnormal pigmentation.   NEUROLOGIC:  Cranial nerves seem to be intact.  Mentation and speech is appropriate for age.   PSYCHIATRIC:  Mentation appears normal.  He does not seem anxious today.            Data:   I have personally reviewed the following labs/imaging:  CBC@LABRCNTIPR(wbc:4,rbc:4,hgb:4,hct:4,mcv:4,mch:4,mchc:4,rdw:4,plt:4)@  CMP@LABRCNTIPR(na:4,potassium:4,chloride:4,co2:4,aniongap:4,g,bun:4,cr:4,gfrestimated:4,gfrestblack:4,hiram:4,ma,phos:4,prottotal:4,albumin:4,bilitotal:4,alkphos:

## 2024-06-26 ENCOUNTER — ONCOLOGY VISIT (OUTPATIENT)
Dept: TRANSPLANT | Facility: CLINIC | Age: 59
End: 2024-06-26
Attending: INTERNAL MEDICINE
Payer: COMMERCIAL

## 2024-06-26 ENCOUNTER — PATIENT OUTREACH (OUTPATIENT)
Dept: ONCOLOGY | Facility: CLINIC | Age: 59
End: 2024-06-26

## 2024-06-26 VITALS
TEMPERATURE: 98 F | BODY MASS INDEX: 25 KG/M2 | OXYGEN SATURATION: 98 % | DIASTOLIC BLOOD PRESSURE: 71 MMHG | SYSTOLIC BLOOD PRESSURE: 124 MMHG | RESPIRATION RATE: 18 BRPM | WEIGHT: 194.8 LBS | HEART RATE: 71 BPM

## 2024-06-26 DIAGNOSIS — K74.69 OTHER CIRRHOSIS OF LIVER (H): ICD-10-CM

## 2024-06-26 DIAGNOSIS — Z14.8 CARRIER OF HEMOCHROMATOSIS HFE GENE MUTATION: Chronic | ICD-10-CM

## 2024-06-26 DIAGNOSIS — E11.40 TYPE 2 DIABETES MELLITUS WITH DIABETIC NEUROPATHY, WITHOUT LONG-TERM CURRENT USE OF INSULIN (H): ICD-10-CM

## 2024-06-26 DIAGNOSIS — E83.19 IRON OVERLOAD: ICD-10-CM

## 2024-06-26 DIAGNOSIS — E83.110 HEREDITARY HEMOCHROMATOSIS (H): Primary | ICD-10-CM

## 2024-06-26 PROCEDURE — 99214 OFFICE O/P EST MOD 30 MIN: CPT | Performed by: INTERNAL MEDICINE

## 2024-06-26 PROCEDURE — G0463 HOSPITAL OUTPT CLINIC VISIT: HCPCS | Performed by: INTERNAL MEDICINE

## 2024-06-26 PROCEDURE — G2211 COMPLEX E/M VISIT ADD ON: HCPCS | Performed by: INTERNAL MEDICINE

## 2024-06-26 ASSESSMENT — PAIN SCALES - GENERAL: PAINLEVEL: NO PAIN (0)

## 2024-06-26 NOTE — PROGRESS NOTES
[facility]: Cancer Care Initial Note                                    Discussion with Patient:                                                      Met with pt, Demetrius and his wife, Francheska, after Demetrius's visit with Dr. Billy Andrea today.        Assessment:                                                      Initial  Current living arrangement:: I live in a private home with spouse  Type of residence:: Private home - stairs  Informal Support system:: Spouse;Family;Friends;Children  Equipment Currently Used at Home: none  Bed or wheelchair confined:: No  Mobility Status: Independent  Transportation means:: Family;Regular car      Intervention/Education provided during outreach:                                                     Completed learning assessment with pt.  Discussed consent to communicate with pt.  He decided to complete the form, giving us permission to speak with his wife, Francheska ([phone]).  Form sent to [facility] for entry into chart.  Discussed can go in [facility] to give his wife access to his [employer].  Advised to call tech support number (on first page of [facility] appt) if have any questions.  Discussed to bring copy of completed health care directive to an appt, and have staff copy form and send to HIM (special group) for review and addition to chart, when pt/wife asked.  No other questions at end of discussion.    Signature:  Shruthi Becker, RN, OCN

## 2024-06-26 NOTE — LETTER
6/26/2024      Demetrius Burton  [address] [facility] [address]      Dear Colleague,    Thank you for referring your patient, Demetrius Burton, to the [facility]PL[facility] [place]. Please see a copy of my visit note below.        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   Demetrius seems to be doing a little bit better in terms of his neuropathy after 5 phlebotomies.  His ferritin has come down nicely and I would aim for ferritin for this individual with fibrotic liver disease somewhere in the range of 100-200.  I am glad he saw the genetic counselor and he does not have a mutation that explains his hearing loss.  It is unclear what the significance of this genetic variant is and further exploration and his family may be forthcoming.  It did not seem that he had other significant genes that might explain why he has so much iron in his liver.  He has decreased the use of iron cookware and eating less red meat and does not have much alcohol.  I would like to continue the phlebotomies every 3 weeks and see him again in November.  He will see Dr. Salvador later this summer as well as have an endoscopy done.    Summary of Recommendations:  Phlebotomy  every 2 to 3 weeks for hematocrit>33% at [place] to remove iron which may decrease inflammation in his liver.   Avoid ASA  Good diabetes control, Ophtho exam  Vitamin  E 200 units a day as an antioxidant  RTC in November 2024  Thank you for involving us in the care of this patient.   I spent a total of 30 minutes face to face with Demetrius Burton during today's office visit. Over 50% of this time was spent counseling the patient and coordinating the care regarding iron overload and cirrhoisis and 10minutes preparing to see the patient and  care coordination   .[id number]  Kishor Andrea MD  [phone]          History of Present Illness:   History obtained from chart review and confirmed with patient.    Demetrius Burton is a 59 year old who was in good health until earlier this year when he was diagnosed with diabetes in the setting of a 30 pound unintentional weight loss.  He was started on metformin and jardiance and his hemoglobin A1c dropped from 9.5% to 5.9%.  The jardiance was discontinued. He also has noticed some neuropathy in his feet with pain to touch in his toes and top of his feet. He has had COVID-19 twice in the last couple of years.  His first diagnosis was in February 2022  which lasted  a month treated with antibiotics and steroids.  He then got COVID-19 again in September 2023.    He denies any history of jaundice, abdominal swelling, lower extremity swelling, slowness of thought or confusion.  Denies any diarrhea or constipation, melena or hematochezia.  He denies any nausea or vomiting in the setting of his unintentional weight loss.He denies any known family history of liver disease.  He has several family members with diabetes.  He denies any over-the-counter medications or supplements except for a multivitamin.  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 Throughout his life he has not had issues with alcohol overuse; he drinks about 1 alcohol-containing beverage per month.He was seen by Dr Lambert in Hepatology. CRISTINA , anti actin and hepatitis serologies were negative. He did have an [id number] heterozygosity and an alpha 1 anti  trypsin S heterozygosity. No tylenol abuse.    INTERVAL HISTORY.   Less pain in feet and toes.He is taking Vit E 200u/d and not cooking with iron skillets. He has had 5 phlebotomies at [place] . Last week had phlebotomy and ferritin was 642.No significant alcohol.Was see by Genetics and foun to have a col11a heterozyosity , an uncertain genetic variant.Not Allports  COL4A3 and/or COL4A4  despite his hearing loss     A comprehensive ROS was performed with the patient and was found to be negative with the exception of that noted in the HPI above.  CONSTITUTIONAL Fatigued lost weight 25lb over year, no night sweats, appetite ok no fevers no pruritus  EYES Wears glasses for reading small cataracts  EARS Wears hearing aids Hearing loss started at age 20  RESP Occ cough no asthma No SOB no DIETRICH  COR No chest pain on exertion no heart murmur  GI No GERD no black or blood stools Liver cirrhosis  MS Knees, hips and ankles sore  especially morning stiffness Has charley horses  ENDO Type 2 DM dx'ed In May 2023 takes metformin Was on jardiance   NEURO Toes and feet  are numb bilaterally, pain when touches feet Worked at [employer] had high lead levels  PSYCH Mood ok  SKIN He sunburns easily no skin cancer  ID Had Covid in 2022   with pneumonia with difficulty breathing on antibiotics and steroids lasted a month Had COVID in Oct 2023 lasted a few days no paxlovid  HEME No anemia, no DVT Eats meat was eating venison, not using iron pans less Vit C No blood transfusions No bleeding or petechiae He has had nose bleeding   No UTI occ nocturia         Past Medical History:     Past Medical History:   Diagnosis Date    Cirrhosis of liver (H)     H63D heterozygote    Diabetes mellitus, type 2 (H)     Hyperlipidemia             Past Surgical History:     Past Surgical History:   Procedure Laterality Date    APPENDECTOMY      COLONOSCOPY N/A 05/21/2021    Procedure: COLONOSCOPY;  Surgeon: Hipolito Alexander MD;  Location: [facility]  LIVER BIOPSY PERCUTANEOUS  1/5/2024    PERCUTANEOUS BIOPSY LIVER N/A 1/5/2024    Procedure: Percutaneous biopsy liver;  Surgeon: Alvarez Alexander MD;  Location: [facility] OR   Had small bowel resection with appendectomy 25 years ago         Social History:   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 His wife Francheska also works for [employer]  Social History     Socioeconomic History    Marital status:      Spouse name: Not on file    Number of children: Not on file    Years of education: Not on file    Highest education level: Not on file   Occupational History    Not on file   Tobacco Use    Smoking status: Never     Passive exposure: Past    Smokeless tobacco: Never   Vaping Use    Vaping status: Never Used   Substance and Sexual Activity    Alcohol use: Yes     Comment: rare    Drug use: No    Sexual activity: Not on file   Other Topics Concern    Parent/sibling w/ CABG, MI or angioplasty before 65F 55M? No   Social History Narrative    Not on file     Social Determinants of Health     Financial Resource Strain: Not on file   Food Insecurity: Not on file   Transportation Needs: Not on file   Physical Activity: Not on file   Stress: Not on file   Social Connections: Unknown (1/3/2024)    Received from [facility]    Social Connections     Frequency of Communication with Friends and Family: Not on file   Interpersonal Safety: Not on file   Housing Stability: Not on file            Family History:   [place] ancestry  Family History   Problem Relation Age of Onset    Diabetes Mother     Heart Disease Mother         stent placement.    Morbid Obesity Mother         bypass    Valvular heart disease Father     Diabetes Brother     Liver Disease No family hx of    Has 2 children 29 Fabricio and Ric 27 both healthy  Younger brother has DM and skin cancer  No hx of iron overload  Father had nerve deafness and older brother had hearing aids in his 20s. No  kidney issues       Allergies:   No Known Allergies         Medications:   (Not in a hospital admission)      PHYSICAL EXAMINATION:    By the video, he is an alert gentleman, verbal, in no acute distress.     EYES:  Grossly normal to inspection, no discharge, erythema or icterus.   SKIN:  Visible skin is clear.  No significant rash or abnormal pigmentation.   NEUROLOGIC:  Cranial nerves seem to be intact.  Mentation and speech is appropriate for age.   PSYCHIATRIC:  Mentation appears normal.  He does not seem anxious today.            Data:   I have personally reviewed the following labs/imaging:  CBC@LABRCNTIPR(wbc:4,rbc:4,hgb:4,hct:4,mcv:4,mch:4,mchc:4,rdw:4,plt:4)@  CMP@LABRCNTIPR(na:4,potassium:4,chloride:4,co2:4,aniongap:4,g,bun:4,cr:4,gfrestimated:4,gfrestblack:4,hiram:4,ma,phos:4,prottotal:4,albumin:4,bilitotal:4,alkphos:      Kishor Andrea MD

## 2024-07-12 ENCOUNTER — LAB (OUTPATIENT)
Dept: LAB | Facility: CLINIC | Age: 59
End: 2024-07-12
Payer: COMMERCIAL

## 2024-07-12 ENCOUNTER — INFUSION THERAPY VISIT (OUTPATIENT)
Dept: INFUSION THERAPY | Facility: CLINIC | Age: 59
End: 2024-07-12
Attending: INTERNAL MEDICINE
Payer: COMMERCIAL

## 2024-07-12 VITALS
OXYGEN SATURATION: 99 % | TEMPERATURE: 97.9 F | HEART RATE: 70 BPM | SYSTOLIC BLOOD PRESSURE: 127 MMHG | DIASTOLIC BLOOD PRESSURE: 74 MMHG | RESPIRATION RATE: 16 BRPM

## 2024-07-12 DIAGNOSIS — E83.110 HEREDITARY HEMOCHROMATOSIS (H): Primary | ICD-10-CM

## 2024-07-12 LAB
FERRITIN SERPL-MCNC: 481 NG/ML (ref 31–409)
HCT VFR BLD AUTO: 37.2 % (ref 40–53)
HGB BLD-MCNC: 13.5 G/DL (ref 13.3–17.7)

## 2024-07-12 PROCEDURE — 85014 HEMATOCRIT: CPT | Performed by: INTERNAL MEDICINE

## 2024-07-12 PROCEDURE — 258N000003 HC RX IP 258 OP 636: Performed by: INTERNAL MEDICINE

## 2024-07-12 PROCEDURE — 99195 PHLEBOTOMY: CPT

## 2024-07-12 PROCEDURE — 36415 COLL VENOUS BLD VENIPUNCTURE: CPT | Performed by: INTERNAL MEDICINE

## 2024-07-12 PROCEDURE — 82728 ASSAY OF FERRITIN: CPT | Performed by: INTERNAL MEDICINE

## 2024-07-12 RX ORDER — HEPARIN SODIUM (PORCINE) LOCK FLUSH IV SOLN 100 UNIT/ML 100 UNIT/ML
5 SOLUTION INTRAVENOUS
Status: CANCELLED | OUTPATIENT
Start: 2024-07-12

## 2024-07-12 RX ORDER — HEPARIN SODIUM,PORCINE 10 UNIT/ML
5-20 VIAL (ML) INTRAVENOUS DAILY PRN
Status: CANCELLED | OUTPATIENT
Start: 2024-07-12

## 2024-07-12 RX ADMIN — SODIUM CHLORIDE 250 ML: 9 INJECTION, SOLUTION INTRAVENOUS at 14:26

## 2024-07-12 NOTE — PROGRESS NOTES
Infusion Nursing Note:  Demetrius FARIHA Connellbrionna presents today for Phlebotomy and fluids.    Patient seen by provider today: No   present during visit today: Not Applicable.    Note: 20G IV was used for the Phlebotomy      Intravenous Access:  Peripheral IV placed.    Treatment Conditions:  Results reviewed, labs MET treatment parameters, ok to proceed with treatment.      Post Infusion Assessment:  Patient tolerated infusion without incident.  Blood return noted pre and post infusion.  Site patent and intact, free from redness, edema or discomfort.  No evidence of extravasations.  Access discontinued per protocol.       Discharge Plan:   Discharge instructions reviewed with: Patient.  Patient and/or family verbalized understanding of discharge instructions and all questions answered.  Patient discharged in stable condition accompanied by: self.  Departure Mode: Ambulatory.      Tammi Drake RN

## 2024-08-09 NOTE — NURSING NOTE
"Oncology Rooming Note    June 26, 2024 1:14 PM   Demetrius Burton is a 59 year old male who presents for:    Chief Complaint   Patient presents with    Oncology Clinic Visit     Iron Overload      Initial Vitals: /71 (BP Location: Right arm, Patient Position: Sitting, Cuff Size: Adult Regular)   Pulse 71   Temp 98  F (36.7  C) (Oral)   Resp 18   Wt 88.4 kg (194 lb 12.8 oz)   SpO2 98%   BMI 25.00 kg/m   Estimated body mass index is 25 kg/m  as calculated from the following:    Height as of 3/27/24: 1.88 m (6' 2.02\").    Weight as of this encounter: 88.4 kg (194 lb 12.8 oz). Body surface area is 2.15 meters squared.  No Pain (0) Comment: Data Unavailable   No LMP for male patient.  Allergies reviewed: Yes  Medications reviewed: Yes    Medications: Medication refills not needed today.  Pharmacy name entered into "[employer]: [id number] [place] [address]     Frailty Screening:   Is the patient here for a new oncology consult visit in cancer care? 2. No      Clinical concerns:       Kelli Lugo CMA              " For information on Fall & Injury Prevention, visit: [email or web address] OR  [email or web address] OR  https://www.cdc.gov/steadi/patient.html

## 2024-08-23 ENCOUNTER — APPOINTMENT (OUTPATIENT)
Dept: LAB | Facility: CLINIC | Age: 59
End: 2024-08-23
Payer: COMMERCIAL

## 2024-08-23 ENCOUNTER — INFUSION THERAPY VISIT (OUTPATIENT)
Dept: INFUSION THERAPY | Facility: CLINIC | Age: 59
End: 2024-08-23
Attending: INTERNAL MEDICINE
Payer: COMMERCIAL

## 2024-08-23 VITALS — TEMPERATURE: 97.7 F | HEART RATE: 69 BPM | DIASTOLIC BLOOD PRESSURE: 73 MMHG | SYSTOLIC BLOOD PRESSURE: 133 MMHG

## 2024-08-23 DIAGNOSIS — E83.110 HEREDITARY HEMOCHROMATOSIS (H): Primary | ICD-10-CM

## 2024-08-23 LAB
FERRITIN SERPL-MCNC: 434 NG/ML (ref 31–409)
HCT VFR BLD AUTO: 40 % (ref 40–53)
HGB BLD-MCNC: 14.6 G/DL (ref 13.3–17.7)

## 2024-08-23 PROCEDURE — 258N000003 HC RX IP 258 OP 636: Performed by: INTERNAL MEDICINE

## 2024-08-23 PROCEDURE — 36415 COLL VENOUS BLD VENIPUNCTURE: CPT | Performed by: INTERNAL MEDICINE

## 2024-08-23 PROCEDURE — 82728 ASSAY OF FERRITIN: CPT | Performed by: INTERNAL MEDICINE

## 2024-08-23 PROCEDURE — 99195 PHLEBOTOMY: CPT

## 2024-08-23 PROCEDURE — 85018 HEMOGLOBIN: CPT | Performed by: INTERNAL MEDICINE

## 2024-08-23 RX ORDER — HEPARIN SODIUM,PORCINE 10 UNIT/ML
5-20 VIAL (ML) INTRAVENOUS DAILY PRN
Status: CANCELLED | OUTPATIENT
Start: 2024-08-23

## 2024-08-23 RX ORDER — HEPARIN SODIUM,PORCINE 10 UNIT/ML
5-20 VIAL (ML) INTRAVENOUS DAILY PRN
Status: DISCONTINUED | OUTPATIENT
Start: 2024-08-23 | End: 2024-08-23 | Stop reason: HOSPADM

## 2024-08-23 RX ORDER — HEPARIN SODIUM (PORCINE) LOCK FLUSH IV SOLN 100 UNIT/ML 100 UNIT/ML
5 SOLUTION INTRAVENOUS
Status: DISCONTINUED | OUTPATIENT
Start: 2024-08-23 | End: 2024-08-23 | Stop reason: HOSPADM

## 2024-08-23 RX ORDER — HEPARIN SODIUM (PORCINE) LOCK FLUSH IV SOLN 100 UNIT/ML 100 UNIT/ML
5 SOLUTION INTRAVENOUS
Status: CANCELLED | OUTPATIENT
Start: 2024-08-23

## 2024-08-23 RX ADMIN — SODIUM CHLORIDE 500 ML: 9 INJECTION, SOLUTION INTRAVENOUS at 14:50

## 2024-08-23 NOTE — PROGRESS NOTES
Infusion Nursing Note:  Demetrius Bruton presents today for labs and phlebotomy with IV fluids.  Patient seen by provider today: No   present during visit today: Not Applicable.    Note: Total volume of 500 ml of whole blood removed from left upper arm without incident.  Pt denied feeling dizzy or lightheaded even though he's felt this in the past.  Post phlebotomy, 500 ml of normal saline infused via 18 gauge in upper left arm.  VS stable pre and post phlebotomy.      Intravenous Access:  Labs drawn without difficulty.  Peripheral IV placed.    Treatment Conditions:  Results reviewed, labs MET treatment parameters, ok to proceed with treatment.      Post Infusion Assessment:  Patient tolerated phlebotomy without incident.  Blood return noted pre and post infusion.  Site patent and intact, free from redness, edema or discomfort.  No evidence of extravasations.  Access discontinued per protocol.       Discharge Plan:   Patient discharged in stable condition accompanied by: wife.  Departure Mode: Ambulatory.  Pt to return on 9/13/24 at 9:20 am for labs followed by possible phlebotomy at 11:00 am.        Arabella Layne RN

## 2024-08-25 ENCOUNTER — HEALTH MAINTENANCE LETTER (OUTPATIENT)
Age: 59
End: 2024-08-25

## 2024-09-13 ENCOUNTER — INFUSION THERAPY VISIT (OUTPATIENT)
Dept: INFUSION THERAPY | Facility: CLINIC | Age: 59
End: 2024-09-13
Attending: INTERNAL MEDICINE
Payer: COMMERCIAL

## 2024-09-13 ENCOUNTER — LAB (OUTPATIENT)
Dept: LAB | Facility: CLINIC | Age: 59
End: 2024-09-13
Attending: INTERNAL MEDICINE
Payer: COMMERCIAL

## 2024-09-13 ENCOUNTER — HOSPITAL ENCOUNTER (OUTPATIENT)
Dept: ULTRASOUND IMAGING | Facility: CLINIC | Age: 59
Discharge: HOME | End: 2024-09-13
Attending: INTERNAL MEDICINE
Payer: COMMERCIAL

## 2024-09-13 VITALS — SYSTOLIC BLOOD PRESSURE: 128 MMHG | HEART RATE: 70 BPM | DIASTOLIC BLOOD PRESSURE: 73 MMHG

## 2024-09-13 DIAGNOSIS — E83.110 HEREDITARY HEMOCHROMATOSIS (H): Primary | ICD-10-CM

## 2024-09-13 DIAGNOSIS — E83.110 HEREDITARY HEMOCHROMATOSIS (H): ICD-10-CM

## 2024-09-13 LAB
FERRITIN SERPL-MCNC: 414 NG/ML (ref 31–409)
HCT VFR BLD AUTO: 41.5 % (ref 40–53)
HGB BLD-MCNC: 14.7 G/DL (ref 13.3–17.7)

## 2024-09-13 PROCEDURE — 85018 HEMOGLOBIN: CPT | Performed by: INTERNAL MEDICINE

## 2024-09-13 PROCEDURE — 76700 US EXAM ABDOM COMPLETE: CPT

## 2024-09-13 PROCEDURE — 99195 PHLEBOTOMY: CPT

## 2024-09-13 PROCEDURE — 258N000003 HC RX IP 258 OP 636: Performed by: INTERNAL MEDICINE

## 2024-09-13 PROCEDURE — 82728 ASSAY OF FERRITIN: CPT | Performed by: INTERNAL MEDICINE

## 2024-09-13 PROCEDURE — 36415 COLL VENOUS BLD VENIPUNCTURE: CPT | Performed by: INTERNAL MEDICINE

## 2024-09-13 RX ORDER — HEPARIN SODIUM (PORCINE) LOCK FLUSH IV SOLN 100 UNIT/ML 100 UNIT/ML
5 SOLUTION INTRAVENOUS
Status: CANCELLED | OUTPATIENT
Start: 2024-09-13

## 2024-09-13 RX ORDER — HEPARIN SODIUM,PORCINE 10 UNIT/ML
5-20 VIAL (ML) INTRAVENOUS DAILY PRN
Status: CANCELLED | OUTPATIENT
Start: 2024-09-13

## 2024-09-13 RX ADMIN — SODIUM CHLORIDE 500 ML: 9 INJECTION, SOLUTION INTRAVENOUS at 11:08

## 2024-09-13 NOTE — PROGRESS NOTES
Infusion Nursing Note:  Demetrius Burton presents today for phlebotomy.    Patient seen by provider today: No   present during visit today: Not Applicable.    Note: Labs drawn peripherally.      Intravenous Access:  Peripheral IV placed, 18 gauge L AC.    Treatment Conditions:  Results reviewed, labs MET treatment parameters, ok to proceed with treatment.      Post Infusion Assessment:  Patient tolerated phlebotomy without incident.  500mL NS bolus given after phlebotomy (see- MAR).       Discharge Plan:   Patient discharged in stable condition accompanied by: self.  Departure Mode: B/P stable pre & post phleb.      Sherman Jain RN

## 2024-09-16 ENCOUNTER — TELEPHONE (OUTPATIENT)
Dept: GASTROENTEROLOGY | Facility: CLINIC | Age: 59
End: 2024-09-16
Payer: COMMERCIAL

## 2024-09-18 ENCOUNTER — MYC MEDICAL ADVICE (OUTPATIENT)
Dept: FAMILY MEDICINE | Facility: CLINIC | Age: 59
End: 2024-09-18
Payer: COMMERCIAL

## 2024-09-18 ENCOUNTER — VIRTUAL VISIT (OUTPATIENT)
Dept: GASTROENTEROLOGY | Facility: CLINIC | Age: 59
End: 2024-09-18
Attending: PHYSICIAN ASSISTANT
Payer: COMMERCIAL

## 2024-09-18 VITALS — BODY MASS INDEX: 23.1 KG/M2 | WEIGHT: 180 LBS

## 2024-09-18 DIAGNOSIS — K74.69 OTHER CIRRHOSIS OF LIVER (H): ICD-10-CM

## 2024-09-18 DIAGNOSIS — Z14.8 CARRIER OF HEMOCHROMATOSIS HFE GENE MUTATION: Primary | Chronic | ICD-10-CM

## 2024-09-18 DIAGNOSIS — E11.9 TYPE 2 DIABETES MELLITUS WITHOUT COMPLICATION, WITHOUT LONG-TERM CURRENT USE OF INSULIN (H): Primary | ICD-10-CM

## 2024-09-18 DIAGNOSIS — E11.69 TYPE 2 DIABETES MELLITUS WITH OTHER SPECIFIED COMPLICATION, WITHOUT LONG-TERM CURRENT USE OF INSULIN (H): ICD-10-CM

## 2024-09-18 PROCEDURE — 99214 OFFICE O/P EST MOD 30 MIN: CPT | Mod: 95 | Performed by: PHYSICIAN ASSISTANT

## 2024-09-18 ASSESSMENT — PAIN SCALES - GENERAL: PAINLEVEL: NO PAIN (0)

## 2024-09-18 NOTE — PROGRESS NOTES
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 Cirrhosis complicated by large EV s/p banding, otherwise well compensated. Transaminases improving with phlebotomy, but Alk Phos remains mildly elevated. He has low platelets, otherwise minimal liver dysfunction. MELD 3.0 8 driven by INR 1.24.     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 Lambert in six months     Nestor Guido PA-C   [facility] Hepatology clinic    Total time for E/M services performed on the date of the encounter 30 minutes.  This included review of previous: clinic visits, hospital records, lab results, imaging studies, and procedural documentation. Time also includes patient visit, documentation and discussion with other providers.  The findings from this review are summarized in the above note.  The longitudinal plan of care for the diagnosis(es)/condition(s) as documented were addressed during this visit. Due to the added  complexity in care, I will continue to support Demetrius in the subsequent management and with ongoing continuity of care.  -----------------------------------------------------       HPI:   Demetrius Burton is a 59 year old male presenting for follow-up.     Cirrhosis HFE heterozygota/A1AT MS + MASLD  - dx ~ 2024  - Etiology: H63D heterozygote + iron overload + ?MASLD  - Liver biopsy: 1/2024  - no hx HE  - no hx ascites  - no hx variceal bleed  EGD: 5/22/2024: s/p banding x 3  HCC: 9/12/2024: US abdomen     Patient was last seen by Dr. Lambert on 3/27/2024.     Recent hospitalizations or ER visits:   New medications: no new medication.     Appetite is pretty good. Quit cooking on cast-iron. No venison and eating more pork, beef. Avoid foods that fortified w/ iron. Weight is down about 15 lbs in the last year. Limit process sugar.     Having regular bowel movements. No blood.   Having itching - different places. Doesn't affect sleep. Would not want any medications at this time. Has been there about six months.     Still have numbness on the end of toes that makes activity bothersome.   Does't check blood glucose at home   Was having swelling in legs, now improved    Patient denies jaundice, abdominal distension or confusion.    Patient also denies melena, hematochezia or hematemesis.  Patient denies fevers, sweats or chills.    Generally no a few wine coolers a month. Nothing for a few months.     Recent Labs   Lab Test 09/13/24  0928 08/23/24  1329 07/12/24  1328 06/21/24  1244 05/31/24  1248 05/10/24  1247 04/19/24  1249 03/04/24  0736 01/30/24  0912 12/27/23  0655   EDSON 414* 434* 481* 642* 837* 744* 811* 959* 1,222* 1,247*     Previous work-up:   Lab Results   Component Value Date    HEPBANG Nonreactive 12/04/2023    HBCAB Nonreactive 12/04/2023    AUSAB 4.97 12/04/2023    HCVAB Nonreactive 05/05/2023    EDSON 414 (H) 09/13/2024    IRONSAT 44 06/21/2024    RENATO Negative 12/27/2023    SMOOTHMUS 18 12/27/2023    C7YGPYD  Whole Blood 12/27/2023    A1A 129 12/27/2023    S4WXWYX Heterozygous (A) 12/27/2023    V8DQTVV Negative 12/27/2023    M5GCKCHA Not Applicable 12/27/2023    N4ZNDCREQ See Note 12/27/2023    TSH 2.61 08/14/2023    CHOL 131 05/05/2023    HDL 36 (L) 05/05/2023    LDL 72 05/05/2023    TRIG 117 05/05/2023    A1C 7.6 (H) 03/27/2024    POPETH <10 12/27/2023    PLPETH <10 12/27/2023      Lab Results   Component Value Date    SPECDES  01/19/2024     Blood: ACD      LDRESULTS  01/19/2024     HEMOCHROMATOSIS RESULTS  HFE Gene C282Y (G845A) RESULTS:  C282Y Mutation Interpretation: NORMAL  HFE Gene H63D (C187G) RESULTS:  H63D Mutation Interpretation: HETEROZYGOTE  HFE Gene S65C (A193T) RESULTS:  S65C Mutation Interpretation: NORMAL    [id number] c.2913 A>G p.(P971=), heterozygous, variant of uncertain significance.   hereditary hemochromatosis including analysis of the following genes:   FTH1, HAMP, HFE, HJV, DUD33Q3, TFR2.              Medications:     Current Outpatient Medications   Medication Sig Dispense Refill    gabapentin (NEURONTIN) 300 MG capsule Take 1 capsule (300 mg) by mouth nightly as needed for neuropathic pain 30 capsule 3    metFORMIN (GLUCOPHAGE XR) 500 MG 24 hr tablet Take 1 tablet (500 mg) by mouth daily (with dinner) 90 tablet 3    SHINGRIX injection       Vitamin E 90 MG (200 UNIT) capsule Take 200 Units by mouth daily       No current facility-administered medications for this visit.            Review of Systems:   10 points ROS was obtained and highlighted in the HPI, otherwise negative.     Wt 81.6 kg (180 lb)   BMI 23.10 kg/m    Gen: A&Ox3, NAD, well developed  HEENT: non-icteric   CV: RRR, no overt murmurs  Lung: CTA Bilatererally, no wheezing or crackles.   Lym- no palpable lymphadenopathy  Abd: soft, NT, ND, no organomegaly.   Ext: no edema, intact pulses.   Skin: No rash, no palmar erythema, telangiectasias or jaundice  Neuro: grossly intact, no asterixis   Psych: appropriate mood and affects          "Data:   Reviewed in person and significant for:    Lab Results   Component Value Date     06/21/2024     11/05/2009      Lab Results   Component Value Date    POTASSIUM 4.1 06/21/2024    POTASSIUM 4.3 11/05/2009     Lab Results   Component Value Date    CHLORIDE 110 06/21/2024    CHLORIDE 108 11/05/2009     Lab Results   Component Value Date    CO2 19 06/21/2024    CO2 28 11/05/2009     Lab Results   Component Value Date    BUN 14.2 06/21/2024    BUN 14 11/05/2009     Lab Results   Component Value Date    CR 0.70 06/21/2024    CR 1.03 11/05/2009       Lab Results   Component Value Date    WBC 2.9 03/27/2024    WBC 5.1 09/18/2006     Lab Results   Component Value Date    HGB 14.7 09/13/2024    HGB 15.8 09/18/2006     Lab Results   Component Value Date    HCT 41.5 09/13/2024    HCT 44.6 09/18/2006     Lab Results   Component Value Date    MCV 99 03/27/2024    MCV 96 09/18/2006     Lab Results   Component Value Date    PLT 55 03/27/2024     09/18/2006       Lab Results   Component Value Date    AST 63 06/21/2024    AST 75 09/18/2006     Lab Results   Component Value Date    ALT 63 06/21/2024     09/18/2006     No results found for: \"BILICONJ\"   Lab Results   Component Value Date    BILITOTAL 1.2 06/21/2024    BILITOTAL 0.7 09/18/2006       Lab Results   Component Value Date    ALBUMIN 3.9 06/21/2024    ALBUMIN 4.7 09/18/2006     Lab Results   Component Value Date    PROTTOTAL 7.4 06/21/2024    PROTTOTAL 8.2 09/18/2006      Lab Results   Component Value Date    ALKPHOS 204 06/21/2024    ALKPHOS 116 09/18/2006       Lab Results   Component Value Date    INR 1.24 03/27/2024     US ABDOMEN COMPLETE 9/13/2024 10:24 AM     CLINICAL HISTORY: HCC screening; Hereditary hemochromatosis (H24)     TECHNIQUE: Complete abdominal ultrasound.     COMPARISON: Abdominal ultrasounds, most recently 9/26/2023.     FINDINGS:     GALLBLADDER: Gallstones in an otherwise normal gallbladder. No wall  thickening, or " pericholecystic fluid. Negative sonographic Ugarte's  sign per the sonographer.     BILE DUCTS: No biliary dilatation. The common duct measures 7 mm.     LIVER: Coarsened echotexture, suggesting underlying fibrosis. Nodular  contour. No focal mass.     RIGHT KIDNEY: Normal size. Normal echogenicity with no hydronephrosis  or mass.      LEFT KIDNEY: Normal size. Normal echogenicity with no hydronephrosis  or mass.      SPLEEN: Normal.     PANCREAS: The visualized portions are normal.     AORTA: Normal in caliber.      IVC: Normal where visualized.     No ascites.                                                                      IMPRESSION:  1.  Cirrhotic appearance of the liver. No focal mass.     2.  Cholelithiasis without sonographic evidence for acute  cholecystitis.     NELDA SANFORD MD     Advanced cirrhosis, inactive (Laennec fibrosis stage 4C):   -With hemosiderosis, (3-4+ iron on a scale of 0-4)   -Etiology uncertain but see comment      Electronically signed by Alisha Rouse MD on 1/9/2024 at 11:18 AM   Comment  UUMAYO   The sample size is adequate and shows benign liver parenchyma with severe fibrosis seen with the trichrome and reticulin stains. There is no significant lobular inflammation, the lymphocytes seen being limited to fibrous septa, where associated reactive ductules are also seen. Fibrosis is advanced, appreciable on routine H&E stain and supported by the trichrome and reticulin stains. There are nodules surrounded by broad fibrous septae, but the extent of fibrosis is such that it diffusely disrupts the majority of the sample surface area. Iron stain shows patchy but marked increased iron deposition within hepatocytes. PAS and PAS-D stains show no abnormal cytoplasmic accumulations, and bile ducts appear generally normal with no evidence of an underlying chronic biliary disease.     The cause of cirrhosis is unclear, although given risk factors for fatty liver disease (hyperlipidemia and  type 2 diabetes mellitus), one would have to consider steatohepatitis, now burnt out with cirrhosis development as the top candidate. The degree of observed iron could be due to advanced fibrosis. However, given elevated ferritin that does not match ALT/AST levels, the possibility of an underlying primary (i.e. genetic hemochromatosis) or secondary iron metabolic disorder having contributed to cirrhosis development may need to be investigated.

## 2024-09-18 NOTE — Clinical Note
Please reach out to patient to schedule Cardiac MRI and MRI/MRCP of liver. Needs follow up with Dr. Lambert in six months.   Thanks, Nestor

## 2024-09-18 NOTE — NURSING NOTE
Current patient location: [place]    Is the patient currently in the state of [place]? YES    Visit mode:VIDEO    If the visit is dropped, the patient can be reconnected by: VIDEO VISIT: Send to e-mail at: [email or web address]    Will anyone else be joining the visit? NO  (If patient encounters technical issues they should call [phone])    How would you like to obtain your AVS? MyChart    Are changes needed to the allergy or medication list? No    Are refills needed on medications prescribed by this physician? NO    Rooming Documentation:  Questionnaire(s) completed      Reason for visit: ZAIN BRASHER

## 2024-09-18 NOTE — LETTER
9/18/2024      Demetrius Burton  [address] [facility] [address]      Dear Colleague,    Thank you for referring your patient, Demetrius Burton, to the [facility] [place]. Please see a copy of my visit note below.    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 Cirrhosis complicated by large EV s/p banding, otherwise well compensated. Transaminases improving with phlebotomy, but Alk Phos remains mildly elevated. He has low platelets, otherwise minimal liver dysfunction. MELD 3.0 8 driven by INR 1.24.     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 Lambert in six months     Nestor Guido PA-C   [facility] Hepatology clinic    Total time for E/M services performed on the date of the encounter 30 minutes.  This included review of previous: clinic visits, hospital records, lab results, imaging studies, and procedural documentation. Time also includes  patient visit, documentation and discussion with other providers.  The findings from this review are summarized in the above note.  The longitudinal plan of care for the diagnosis(es)/condition(s) as documented were addressed during this visit. Due to the added complexity in care, I will continue to support Demetrius in the subsequent management and with ongoing continuity of care.  -----------------------------------------------------       HPI:   Demetrius Burton is a 59 year old male presenting for follow-up.     Cirrhosis HFE heterozygota/A1AT MS + MASLD  - dx ~ 2024  - Etiology: H63D heterozygote + iron overload + ?MASLD  - Liver biopsy: 1/2024  - no hx HE  - no hx ascites  - no hx variceal bleed  EGD: 5/22/2024: s/p banding x 3  HCC: 9/12/2024: US abdomen     Patient was last seen by Dr. Lambert on 3/27/2024.     Recent hospitalizations or ER visits:   New medications: no new medication.     Appetite is pretty good. Quit cooking on cast-iron. No venison and eating more pork, beef. Avoid foods that fortified w/ iron. Weight is down about 15 lbs in the last year. Limit process sugar.     Having regular bowel movements. No blood.   Having itching - different places. Doesn't affect sleep. Would not want any medications at this time. Has been there about six months.     Still have numbness on the end of toes that makes activity bothersome.   Does't check blood glucose at home   Was having swelling in legs, now improved    Patient denies jaundice, abdominal distension or confusion.    Patient also denies melena, hematochezia or hematemesis.  Patient denies fevers, sweats or chills.    Generally no a few wine coolers a month. Nothing for a few months.     Recent Labs   Lab Test 09/13/24  0928 08/23/24  1329 07/12/24  1328 06/21/24  1244 05/31/24  1248 05/10/24  1247 04/19/24  1249 03/04/24  0736 01/30/24  0912 12/27/23  0655   EDSON 414* 434* 481* 642* 837* 744* 811* 959* 1,222* 1,247*     Previous work-up:   Lab Results    Component Value Date    HEPBANG Nonreactive 12/04/2023    HBCAB Nonreactive 12/04/2023    AUSAB 4.97 12/04/2023    HCVAB Nonreactive 05/05/2023    EDSON 414 (H) 09/13/2024    IRONSAT 44 06/21/2024    RENATO Negative 12/27/2023    SMOOTHMUS 18 12/27/2023    S8DDIUG Whole Blood 12/27/2023    A1A 129 12/27/2023    O0XVXZQ Heterozygous (A) 12/27/2023    A2HELGM Negative 12/27/2023    C6UEUEYE Not Applicable 12/27/2023    F0QBTJSXZ See Note 12/27/2023    TSH 2.61 08/14/2023    CHOL 131 05/05/2023    HDL 36 (L) 05/05/2023    LDL 72 05/05/2023    TRIG 117 05/05/2023    A1C 7.6 (H) 03/27/2024    POPETH <10 12/27/2023    PLPETH <10 12/27/2023      Lab Results   Component Value Date    SPECDES  01/19/2024     Blood: ACD      LDRESULTS  01/19/2024     HEMOCHROMATOSIS RESULTS  HFE Gene C282Y (G845A) RESULTS:  C282Y Mutation Interpretation: NORMAL  HFE Gene H63D (C187G) RESULTS:  H63D Mutation Interpretation: HETEROZYGOTE  HFE Gene S65C (A193T) RESULTS:  S65C Mutation Interpretation: NORMAL    PLL02E5 c.2913 A>G p.(P971=), heterozygous, variant of uncertain significance.   hereditary hemochromatosis including analysis of the following genes:   FTH1, HAMP, HFE, HJV, UBC11E7, TFR2.              Medications:     Current Outpatient Medications   Medication Sig Dispense Refill     gabapentin (NEURONTIN) 300 MG capsule Take 1 capsule (300 mg) by mouth nightly as needed for neuropathic pain 30 capsule 3     metFORMIN (GLUCOPHAGE XR) 500 MG 24 hr tablet Take 1 tablet (500 mg) by mouth daily (with dinner) 90 tablet 3     SHINGRIX injection        Vitamin E 90 MG (200 UNIT) capsule Take 200 Units by mouth daily       No current facility-administered medications for this visit.            Review of Systems:   10 points ROS was obtained and highlighted in the HPI, otherwise negative.     Wt 81.6 kg (180 lb)   BMI 23.10 kg/m    Gen: A&Ox3, NAD, well developed  HEENT: non-icteric   CV: RRR, no overt murmurs  Lung: CTA Bilatererally, no  "wheezing or crackles.   Lym- no palpable lymphadenopathy  Abd: soft, NT, ND, no organomegaly.   Ext: no edema, intact pulses.   Skin: No rash, no palmar erythema, telangiectasias or jaundice  Neuro: grossly intact, no asterixis   Psych: appropriate mood and affects         Data:   Reviewed in person and significant for:    Lab Results   Component Value Date     06/21/2024     11/05/2009      Lab Results   Component Value Date    POTASSIUM 4.1 06/21/2024    POTASSIUM 4.3 11/05/2009     Lab Results   Component Value Date    CHLORIDE 110 06/21/2024    CHLORIDE 108 11/05/2009     Lab Results   Component Value Date    CO2 19 06/21/2024    CO2 28 11/05/2009     Lab Results   Component Value Date    BUN 14.2 06/21/2024    BUN 14 11/05/2009     Lab Results   Component Value Date    CR 0.70 06/21/2024    CR 1.03 11/05/2009       Lab Results   Component Value Date    WBC 2.9 03/27/2024    WBC 5.1 09/18/2006     Lab Results   Component Value Date    HGB 14.7 09/13/2024    HGB 15.8 09/18/2006     Lab Results   Component Value Date    HCT 41.5 09/13/2024    HCT 44.6 09/18/2006     Lab Results   Component Value Date    MCV 99 03/27/2024    MCV 96 09/18/2006     Lab Results   Component Value Date    PLT 55 03/27/2024     09/18/2006       Lab Results   Component Value Date    AST 63 06/21/2024    AST 75 09/18/2006     Lab Results   Component Value Date    ALT 63 06/21/2024     09/18/2006     No results found for: \"BILICONJ\"   Lab Results   Component Value Date    BILITOTAL 1.2 06/21/2024    BILITOTAL 0.7 09/18/2006       Lab Results   Component Value Date    ALBUMIN 3.9 06/21/2024    ALBUMIN 4.7 09/18/2006     Lab Results   Component Value Date    PROTTOTAL 7.4 06/21/2024    PROTTOTAL 8.2 09/18/2006      Lab Results   Component Value Date    ALKPHOS 204 06/21/2024    ALKPHOS 116 09/18/2006       Lab Results   Component Value Date    INR 1.24 03/27/2024     US ABDOMEN COMPLETE 9/13/2024 10:24 AM   "   CLINICAL HISTORY: HCC screening; Hereditary hemochromatosis (H24)     TECHNIQUE: Complete abdominal ultrasound.     COMPARISON: Abdominal ultrasounds, most recently 9/26/2023.     FINDINGS:     GALLBLADDER: Gallstones in an otherwise normal gallbladder. No wall  thickening, or pericholecystic fluid. Negative sonographic Ugarte's  sign per the sonographer.     BILE DUCTS: No biliary dilatation. The common duct measures 7 mm.     LIVER: Coarsened echotexture, suggesting underlying fibrosis. Nodular  contour. No focal mass.     RIGHT KIDNEY: Normal size. Normal echogenicity with no hydronephrosis  or mass.      LEFT KIDNEY: Normal size. Normal echogenicity with no hydronephrosis  or mass.      SPLEEN: Normal.     PANCREAS: The visualized portions are normal.     AORTA: Normal in caliber.      IVC: Normal where visualized.     No ascites.                                                                      IMPRESSION:  1.  Cirrhotic appearance of the liver. No focal mass.     2.  Cholelithiasis without sonographic evidence for acute  cholecystitis.     NELDA SANFORD MD     Advanced cirrhosis, inactive (Laennec fibrosis stage 4C):   -With hemosiderosis, (3-4+ iron on a scale of 0-4)   -Etiology uncertain but see comment      Electronically signed by Alisha Rouse MD on 1/9/2024 at 11:18 AM   Comment  UUMAYO   The sample size is adequate and shows benign liver parenchyma with severe fibrosis seen with the trichrome and reticulin stains. There is no significant lobular inflammation, the lymphocytes seen being limited to fibrous septa, where associated reactive ductules are also seen. Fibrosis is advanced, appreciable on routine H&E stain and supported by the trichrome and reticulin stains. There are nodules surrounded by broad fibrous septae, but the extent of fibrosis is such that it diffusely disrupts the majority of the sample surface area. Iron stain shows patchy but marked increased iron deposition within  hepatocytes. PAS and PAS-D stains show no abnormal cytoplasmic accumulations, and bile ducts appear generally normal with no evidence of an underlying chronic biliary disease.     The cause of cirrhosis is unclear, although given risk factors for fatty liver disease (hyperlipidemia and type 2 diabetes mellitus), one would have to consider steatohepatitis, now burnt out with cirrhosis development as the top candidate. The degree of observed iron could be due to advanced fibrosis. However, given elevated ferritin that does not match ALT/AST levels, the possibility of an underlying primary (i.e. genetic hemochromatosis) or secondary iron metabolic disorder having contributed to cirrhosis development may need to be investigated.        Virtual Visit Details    Type of service:  Video Visit   Joined the call at 9/18/2024, 8:25:41 am.  Left the call at 9/18/2024, 9:07:19 am.  Originating Location (pt. Location): Home  Distant Location (provider location):  Off-site  Platform used for Video Visit: Mily      Again, thank you for allowing me to participate in the care of your patient.        Sincerely,        Nestor Guido PA-C

## 2024-09-18 NOTE — PROGRESS NOTES
Virtual Visit Details    Type of service:  Video Visit   Joined the call at 9/18/2024, 8:25:41 am.  Left the call at 9/18/2024, 9:07:19 am.  Originating Location (pt. Location): Home  Distant Location (provider location):  Off-site  Platform used for Video Visit: [email or web address]

## 2024-09-23 ENCOUNTER — MYC MEDICAL ADVICE (OUTPATIENT)
Dept: FAMILY MEDICINE | Facility: CLINIC | Age: 59
End: 2024-09-23
Payer: COMMERCIAL

## 2024-09-23 DIAGNOSIS — E11.9 TYPE 2 DIABETES MELLITUS WITHOUT COMPLICATION, WITHOUT LONG-TERM CURRENT USE OF INSULIN (H): Primary | ICD-10-CM

## 2024-09-24 ENCOUNTER — TELEPHONE (OUTPATIENT)
Dept: FAMILY MEDICINE | Facility: CLINIC | Age: 59
End: 2024-09-24
Payer: COMMERCIAL

## 2024-09-24 DIAGNOSIS — E11.9 NEWLY DIAGNOSED DIABETES (H): ICD-10-CM

## 2024-09-24 RX ORDER — ACYCLOVIR 400 MG/1
1 TABLET ORAL ONCE
Qty: 1 EACH | Refills: 0 | Status: SHIPPED | OUTPATIENT
Start: 2024-09-24 | End: 2024-09-24

## 2024-09-24 RX ORDER — ACYCLOVIR 400 MG/1
1 TABLET ORAL
Qty: 9 EACH | Refills: 5 | Status: SHIPPED | OUTPATIENT
Start: 2024-09-24

## 2024-09-24 RX ORDER — METFORMIN HCL 500 MG
500 TABLET, EXTENDED RELEASE 24 HR ORAL
Qty: 30 TABLET | Refills: 3 | Status: SHIPPED | OUTPATIENT
Start: 2024-09-24

## 2024-09-24 RX ORDER — METFORMIN HCL 500 MG
500 TABLET, EXTENDED RELEASE 24 HR ORAL
Qty: 90 TABLET | Refills: 3 | OUTPATIENT
Start: 2024-09-24

## 2024-09-24 NOTE — TELEPHONE ENCOUNTER
2 duoneb by ems, wears 4L NC at home     Carlos Fields, RN  11/07/21 8459 Patient was due for annual office visit. Appointment made with Dr. Salas for 10/25/2024. Patient needing derick refill of metformin to get him through until his appointment.     Routing to Dr. Salas for review.    Ramandeep SHEN RN  [facility]  [phone]

## 2024-09-24 NOTE — TELEPHONE ENCOUNTER
Routed to provider for consideration.    Please see MyChart message from pt asking for an order for Dexcom CGM?   However, pt has not been seen in a little over a year and is due for office visit and updated lab work.    Hemoglobin A1C   Date Value Ref Range Status   03/27/2024 7.6 (H) <5.7 % Final     Comment:     Normal <5.7%   Prediabetes 5.7-6.4%    Diabetes 6.5% or higher     Note: Adopted from ADA consensus guidelines.       Dary Jones RN

## 2024-09-25 NOTE — TELEPHONE ENCOUNTER
PRIOR AUTHORIZATION DENIED    Medication: DEXCOM G7 SENSOR MISC  Insurance Company: [employer] - Phone [phone] Fax [phone]  Denial Date: 9/24/2024  Denial Reason(s):     Appeal Information:     Patient Notified: No

## 2024-09-25 NOTE — TELEPHONE ENCOUNTER
PRIOR AUTHORIZATION DENIED    Medication: DEXCOM G7  SHYAM  Insurance Company: [employer] - Phone [phone] Fax [phone]  Denial Date: 9/24/2024  Denial Reason(s):     Appeal Information:     Patient Notified: No

## 2024-09-27 ENCOUNTER — TELEPHONE (OUTPATIENT)
Dept: GASTROENTEROLOGY | Facility: CLINIC | Age: 59
End: 2024-09-27
Payer: COMMERCIAL

## 2024-09-30 ENCOUNTER — TELEPHONE (OUTPATIENT)
Dept: GASTROENTEROLOGY | Facility: CLINIC | Age: 59
End: 2024-09-30
Payer: COMMERCIAL

## 2024-09-30 NOTE — TELEPHONE ENCOUNTER
Pre visit planning completed.      Procedure details:    Patient scheduled for Colonoscopy on 10/10/24.     Arrival time: 0630. Procedure time 0730    Facility location: [facility]; [address] [place] [address], [address] Floor, [place] [address]. Check in location: 5th Floor.    Sedation type: MAC    Pre op exam needed? No.    Indication for procedure:   Cirrhosis of liver without ascites, unspecified hepatic cirrhosis type            Chart review:     Electronic implanted devices? No    Recent diagnosis of diverticulitis within the last 6 weeks? No      Medication review:    Diabetic? Yes. Diabetic medication HOLDING recommendations: Oral diabetic medications: Metformin (glucophage): HOLD day of procedure.    Anticoagulants? No    Weight loss medication/injectable? No GLP-1 medication per patient's medication list.  RN will verify with pre-assessment call.    Other medication HOLDING recommendations:  N/A      Prep for procedure:     Prep instructions sent via bautista Reese RN  Endoscopy Procedure Pre Assessment RN  [phone] option 2

## 2024-10-01 NOTE — TELEPHONE ENCOUNTER
Pre assessment completed for upcoming procedure.      Procedure details:    Patient scheduled for Upper endoscopy (EGD) on 10/10/24.     Arrival time: 0630. Procedure time 0730    Facility location: [facility]; [address] [place] [address], [address] Floor, [place] [address]. Check in location: 5th Floor.    Sedation type: MAC    Pre op exam needed? No.    Indication for procedure: Cirrhosis of liver without ascites, unspecified hepatic cirrhosis type     Designated  policy reviewed. Instructed to have someone stay 24 hours post procedure.       Chart review:     Electronic implanted devices? No    Recent diagnosis of diverticulitis within the last 6 weeks?  N/A      Medication review:    Diabetic? Yes. Diabetic medication HOLDING recommendations: Oral diabetic medications: Metformin (glucophage): HOLD day of procedure.    Anticoagulants? No    Weight loss medication/injectable? No.    Other medication HOLDING recommendations:  N/A      Prep for procedure:     Prep instructions sent via [employer]     Reviewed procedure prep instructions.  - patient was at work and busy during the time RN called, did offer to patient to call back when he is not working. Patient declined and said it was fine to go through things at this time.     Patient verbalized understanding and had no questions or concerns at this time.        Alix Cortez RN  Endoscopy Procedure Pre Assessment   [phone] option 2

## 2024-10-04 ENCOUNTER — LAB (OUTPATIENT)
Dept: LAB | Facility: CLINIC | Age: 59
End: 2024-10-04
Payer: COMMERCIAL

## 2024-10-04 ENCOUNTER — INFUSION THERAPY VISIT (OUTPATIENT)
Dept: INFUSION THERAPY | Facility: CLINIC | Age: 59
End: 2024-10-04
Attending: FAMILY MEDICINE
Payer: COMMERCIAL

## 2024-10-04 VITALS
TEMPERATURE: 97.9 F | OXYGEN SATURATION: 98 % | DIASTOLIC BLOOD PRESSURE: 74 MMHG | SYSTOLIC BLOOD PRESSURE: 127 MMHG | RESPIRATION RATE: 16 BRPM | BODY MASS INDEX: 25.59 KG/M2 | WEIGHT: 199.4 LBS | HEART RATE: 78 BPM

## 2024-10-04 DIAGNOSIS — E83.110 HEREDITARY HEMOCHROMATOSIS (H): Primary | ICD-10-CM

## 2024-10-04 LAB
FERRITIN SERPL-MCNC: 329 NG/ML (ref 31–409)
HCT VFR BLD AUTO: 39.8 % (ref 40–53)
HGB BLD-MCNC: 14.3 G/DL (ref 13.3–17.7)

## 2024-10-04 PROCEDURE — 258N000003 HC RX IP 258 OP 636: Performed by: INTERNAL MEDICINE

## 2024-10-04 PROCEDURE — 36415 COLL VENOUS BLD VENIPUNCTURE: CPT | Performed by: INTERNAL MEDICINE

## 2024-10-04 PROCEDURE — 99195 PHLEBOTOMY: CPT

## 2024-10-04 PROCEDURE — 85014 HEMATOCRIT: CPT | Performed by: INTERNAL MEDICINE

## 2024-10-04 PROCEDURE — 82728 ASSAY OF FERRITIN: CPT | Performed by: INTERNAL MEDICINE

## 2024-10-04 RX ORDER — HEPARIN SODIUM,PORCINE 10 UNIT/ML
5-20 VIAL (ML) INTRAVENOUS DAILY PRN
Status: CANCELLED | OUTPATIENT
Start: 2024-10-04

## 2024-10-04 RX ORDER — HEPARIN SODIUM (PORCINE) LOCK FLUSH IV SOLN 100 UNIT/ML 100 UNIT/ML
5 SOLUTION INTRAVENOUS
Status: CANCELLED | OUTPATIENT
Start: 2024-10-04

## 2024-10-04 RX ADMIN — SODIUM CHLORIDE 500 ML: 9 INJECTION, SOLUTION INTRAVENOUS at 14:53

## 2024-10-04 ASSESSMENT — PAIN SCALES - GENERAL: PAINLEVEL: MODERATE PAIN (4)

## 2024-10-04 NOTE — PROGRESS NOTES
Infusion Nursing Note:  Demetrius Burton presents today for Phlebotomy.    Patient seen by provider today: No   present during visit today: Not Applicable.    Note: Patient reported to clinic today with no new complaints or concerns. Patient declined any interventions for pain during appt time.    Intravenous Access:  Peripheral IV placed.    Treatment Conditions:  Lab Results   Component Value Date    HGB 14.3 10/04/2024    WBC 2.9 (L) 03/27/2024    ANEUTAUTO 1.7 03/04/2024    PLT 55 (L) 03/27/2024        Results reviewed, labs MET treatment parameters, ok to proceed with treatment.    Hematocrit 39.8  Post Infusion Assessment:  Patient tolerated infusion without incident.  Blood return noted pre and post infusion.  Site patent and intact, free from redness, edema or discomfort.  No evidence of extravasations.  Access discontinued per protocol.    Discharge Plan:   Discharge instructions reviewed with: Patient.  Patient and/or family verbalized understanding of discharge instructions and all questions answered.  AVS to patient via [email or web address].  Patient will return 10/25/24 for next appointment.   Patient discharged in stable condition accompanied by: self.  Departure Mode: Ambulatory.    Tae Victor, RN, RN

## 2024-10-04 NOTE — PATIENT INSTRUCTIONS
[facility] [place] Main Line: [phone]    Call triage nurse with chills and/or temperature greater than or equal to 100.4, uncontrolled nausea/vomiting, diarrhea, constipation, dizziness, shortness of breath, chest pain, bleeding, unexplained bruising, or any new/concerning symptoms, questions/concerns.   If you are having any concerning symptoms or wish to speak to a provider before your next infusion visit, please call your care coordinator or triage to notify them so we can adequately serve you.   Nurse Triage line:  [phone]    If after hours, weekends, or holidays, call [facility]  and ask for Oncology doctor on call @ [phone]     October 2024 Sunday Monday Tuesday Wednesday Thursday Friday Saturday             1     2     3     4    LAB   1:30 PM   (10 min.)   [facility]    INFUSION 1 HR (60 MIN)   2:30 PM   (60 min.)   [place] [facility] INFUSION NURSE   [facility] 5       6     7     8     9     10    ESOPHAGOGASTRODUODENOSCOPY   7:30 AM   Piedad Smith MD   [facility]    Outpatient Visit   7:30 AM   Robbie Myrick MD   [facility] 11     12       13     14     15     16     17     18     19       20     21     22     23     24     25    PREVENTATIVE ADULT   7:40 AM   (30 min.)   Alfreda Salas MD   [facility]    LAB   1:40 PM   (10 min.)   [facility]    INFUSION 1 HR (60 MIN)   2:30 PM   (60 min.)   [place] [facility] INFUSION NURSE   [facility]    MR ABDOMEN MRCP WWO   3:45 PM   (45 min.)   [facility]   [facility] 26       27     28     29     30     31 November 2024 Sunday Monday Tuesday Wednesday Thursday Friday Saturday                            1     2       3     4     5     6     7     8     9       10     11     12    LAB PERIPHERAL    7:30 AM   (15 min.)    [facility] DRAW   [facility]    RETURN CCSL   7:45 AM   (30 min.)   Kishor Andrea MD   [facility] [place] 13     14     15     16       17     18     19     20     21     22     23       24     25     26    NEW DERMATOLOGY   2:15 PM   (15 min.)   Reji Alexandre MD   [facility] 27     28     29     30                     Lab Results:  Recent Results (from the past 12 hour(s))   Ferritin    Collection Time: 10/04/24  1:38 PM   Result Value Ref Range    Ferritin 329 31 - 409 ng/mL   Hemoglobin and hematocrit    Collection Time: 10/04/24  1:38 PM   Result Value Ref Range    Hemoglobin 14.3 13.3 - 17.7 g/dL    Hematocrit 39.8 (L) 40.0 - 53.0 %

## 2024-10-08 RX ORDER — LIDOCAINE 40 MG/G
CREAM TOPICAL
Status: CANCELLED | OUTPATIENT
Start: 2024-10-08

## 2024-10-08 RX ORDER — ONDANSETRON 2 MG/ML
4 INJECTION INTRAMUSCULAR; INTRAVENOUS
Status: CANCELLED | OUTPATIENT
Start: 2024-10-08

## 2024-10-09 ENCOUNTER — ANESTHESIA EVENT (OUTPATIENT)
Dept: SURGERY | Facility: AMBULATORY SURGERY CENTER | Age: 59
End: 2024-10-09
Payer: COMMERCIAL

## 2024-10-09 RX ORDER — LABETALOL HYDROCHLORIDE 5 MG/ML
10 INJECTION, SOLUTION INTRAVENOUS
Status: CANCELLED | OUTPATIENT
Start: 2024-10-09

## 2024-10-09 RX ORDER — OXYCODONE HYDROCHLORIDE 5 MG/1
5 TABLET ORAL
Status: CANCELLED | OUTPATIENT
Start: 2024-10-09

## 2024-10-09 RX ORDER — LIDOCAINE 40 MG/G
CREAM TOPICAL
Status: CANCELLED | OUTPATIENT
Start: 2024-10-09

## 2024-10-09 RX ORDER — DEXAMETHASONE SODIUM PHOSPHATE 10 MG/ML
4 INJECTION, SOLUTION INTRAMUSCULAR; INTRAVENOUS
Status: CANCELLED | OUTPATIENT
Start: 2024-10-09

## 2024-10-09 RX ORDER — ACETAMINOPHEN 325 MG/1
975 TABLET ORAL ONCE
Status: CANCELLED | OUTPATIENT
Start: 2024-10-09 | End: 2024-10-09

## 2024-10-09 RX ORDER — ONDANSETRON 2 MG/ML
4 INJECTION INTRAMUSCULAR; INTRAVENOUS EVERY 30 MIN PRN
Status: CANCELLED | OUTPATIENT
Start: 2024-10-09

## 2024-10-09 RX ORDER — ONDANSETRON 4 MG/1
4 TABLET, ORALLY DISINTEGRATING ORAL EVERY 30 MIN PRN
Status: CANCELLED | OUTPATIENT
Start: 2024-10-09

## 2024-10-09 RX ORDER — NALOXONE HYDROCHLORIDE 0.4 MG/ML
0.1 INJECTION, SOLUTION INTRAMUSCULAR; INTRAVENOUS; SUBCUTANEOUS
Status: CANCELLED | OUTPATIENT
Start: 2024-10-09

## 2024-10-09 RX ORDER — FENTANYL CITRATE 50 UG/ML
25 INJECTION, SOLUTION INTRAMUSCULAR; INTRAVENOUS EVERY 5 MIN PRN
Status: CANCELLED | OUTPATIENT
Start: 2024-10-09

## 2024-10-09 RX ORDER — HYDROMORPHONE HYDROCHLORIDE 1 MG/ML
0.2 INJECTION, SOLUTION INTRAMUSCULAR; INTRAVENOUS; SUBCUTANEOUS EVERY 5 MIN PRN
Status: CANCELLED | OUTPATIENT
Start: 2024-10-09

## 2024-10-09 RX ORDER — OXYCODONE HYDROCHLORIDE 5 MG/1
10 TABLET ORAL
Status: CANCELLED | OUTPATIENT
Start: 2024-10-09

## 2024-10-09 RX ORDER — HYDROMORPHONE HYDROCHLORIDE 1 MG/ML
0.4 INJECTION, SOLUTION INTRAMUSCULAR; INTRAVENOUS; SUBCUTANEOUS EVERY 5 MIN PRN
Status: CANCELLED | OUTPATIENT
Start: 2024-10-09

## 2024-10-09 RX ORDER — FENTANYL CITRATE 50 UG/ML
50 INJECTION, SOLUTION INTRAMUSCULAR; INTRAVENOUS EVERY 5 MIN PRN
Status: CANCELLED | OUTPATIENT
Start: 2024-10-09

## 2024-10-09 NOTE — ANESTHESIA PREPROCEDURE EVALUATION
Anesthesia Pre-Procedure Evaluation    Patient: Demetrius Burton   MRN: [id number] : 1965        Procedure : Procedure(s):  Esophagoscopy, gastroscopy, duodenoscopy (EGD), combined          Past Medical History:   Diagnosis Date     Cirrhosis of liver (H)     H63D heterozygote     Diabetes mellitus, type 2 (H)      Hyperlipidemia       Past Surgical History:   Procedure Laterality Date     APPENDECTOMY       COLONOSCOPY N/A 2021    Procedure: COLONOSCOPY;  Surgeon: Hipolito Alexander MD;  Location: [facility]     IR LIVER BIOPSY PERCUTANEOUS  2024     PERCUTANEOUS BIOPSY LIVER N/A 2024    Procedure: Percutaneous biopsy liver;  Surgeon: Alvarez Alexander MD;  [place] [facility]      No Known Allergies   Social History     Tobacco Use     Smoking status: Never     Passive exposure: Past     Smokeless tobacco: Never   Substance Use Topics     Alcohol use: Yes     Comment: rare      Wt Readings from Last 1 Encounters:   10/04/24 90.4 kg (199 lb 6.4 oz)        Anesthesia Evaluation            ROS/MED HX  ENT/Pulmonary:  - neg pulmonary ROS     Neurologic:  - neg neurologic ROS     Cardiovascular:     (+) Dyslipidemia - -   -  - -                                      METS/Exercise Tolerance: >4 METS    Hematologic:  - neg hematologic  ROS     Musculoskeletal:  - neg musculoskeletal ROS     GI/Hepatic:  - neg GI/hepatic ROS     Renal/Genitourinary:  - neg Renal ROS     Endo:     (+)  type II DM,                    Psychiatric/Substance Use:  - neg psychiatric ROS     Infectious Disease:  - neg infectious disease ROS     Malignancy:       Other:            Physical Exam    Airway  airway exam normal           Respiratory Devices and Support         Dental       (+) Minor Abnormalities - some fillings, tiny chips      Cardiovascular   cardiovascular exam normal          Pulmonary   pulmonary exam normal            OUTSIDE LABS:  CBC:   Lab Results   Component Value Date    WBC 2.9 (L) 2024    WBC 3.0  "(L) 03/04/2024    HGB 14.3 10/04/2024    HGB 14.7 09/13/2024    HCT 39.8 (L) 10/04/2024    HCT 41.5 09/13/2024    PLT 55 (L) 03/27/2024    PLT 61 (L) 03/04/2024     BMP:   Lab Results   Component Value Date     06/21/2024     03/27/2024    POTASSIUM 4.1 06/21/2024    POTASSIUM 4.5 03/27/2024    CHLORIDE 110 (H) 06/21/2024    CHLORIDE 109 (H) 03/27/2024    CO2 19 (L) 06/21/2024    CO2 25 03/27/2024    BUN 14.2 06/21/2024    BUN 14.0 03/27/2024    CR 0.70 06/21/2024    CR 0.78 03/27/2024     (H) 06/21/2024     (H) 05/22/2024     COAGS:   Lab Results   Component Value Date    PTT 36 08/21/2023    INR 1.24 (H) 03/27/2024     POC: No results found for: \"BGM\", \"HCG\", \"HCGS\"  HEPATIC:   Lab Results   Component Value Date    ALBUMIN 3.9 06/21/2024    PROTTOTAL 7.4 06/21/2024    ALT 63 06/21/2024    AST 63 (H) 06/21/2024    ALKPHOS 204 (H) 06/21/2024    BILITOTAL 1.2 06/21/2024     OTHER:   Lab Results   Component Value Date    A1C 7.6 (H) 03/27/2024    NAMRATA 9.2 06/21/2024    TSH 2.61 08/14/2023    SED 16 01/30/2024       Anesthesia Plan    ASA Status:  2    NPO Status:  NPO Appropriate    Anesthesia Type: MAC.     - Reason for MAC: straight local not clinically adequate   Induction: Intravenous.   Maintenance: TIVA.        Consents    Anesthesia Plan(s) and associated risks, benefits, and realistic alternatives discussed. Questions answered and patient/representative(s) expressed understanding.     - Discussed: Risks, Benefits and Alternatives for BOTH SEDATION and the PROCEDURE were discussed     - Discussed with:  Patient            Postoperative Care       PONV prophylaxis: Ondansetron (or other 5HT-3), Background Propofol Infusion     Comments:               Kishor Woodward MD    I have reviewed the pertinent notes and labs in the chart from the past 30 days and (re)examined the patient.  Any updates or changes from those notes are reflected in this note.                                 "

## 2024-10-10 ENCOUNTER — ANESTHESIA (OUTPATIENT)
Dept: SURGERY | Facility: AMBULATORY SURGERY CENTER | Age: 59
End: 2024-10-10
Payer: COMMERCIAL

## 2024-10-10 ENCOUNTER — HOSPITAL ENCOUNTER (OUTPATIENT)
Facility: AMBULATORY SURGERY CENTER | Age: 59
Discharge: HOME | End: 2024-10-10
Attending: STUDENT IN AN ORGANIZED HEALTH CARE EDUCATION/TRAINING PROGRAM | Admitting: INTERNAL MEDICINE
Payer: COMMERCIAL

## 2024-10-10 VITALS
SYSTOLIC BLOOD PRESSURE: 106 MMHG | WEIGHT: 190 LBS | OXYGEN SATURATION: 96 % | BODY MASS INDEX: 24.38 KG/M2 | RESPIRATION RATE: 12 BRPM | HEIGHT: 74 IN | DIASTOLIC BLOOD PRESSURE: 87 MMHG | HEART RATE: 92 BPM | TEMPERATURE: 97.1 F

## 2024-10-10 VITALS — HEART RATE: 86 BPM

## 2024-10-10 DIAGNOSIS — K74.69 OTHER CIRRHOSIS OF LIVER (H): Primary | ICD-10-CM

## 2024-10-10 LAB
GLUCOSE BLDC GLUCOMTR-MCNC: 217 MG/DL (ref 70–99)
UPPER GI ENDOSCOPY: NORMAL

## 2024-10-10 PROCEDURE — 82962 GLUCOSE BLOOD TEST: CPT | Performed by: PATHOLOGY

## 2024-10-10 PROCEDURE — 43244 EGD VARICES LIGATION: CPT | Performed by: STUDENT IN AN ORGANIZED HEALTH CARE EDUCATION/TRAINING PROGRAM

## 2024-10-10 PROCEDURE — 43244 EGD VARICES LIGATION: CPT | Performed by: NURSE ANESTHETIST, CERTIFIED REGISTERED

## 2024-10-10 PROCEDURE — 43244 EGD VARICES LIGATION: CPT | Performed by: ANESTHESIOLOGY

## 2024-10-10 RX ORDER — ONDANSETRON 4 MG/1
4 TABLET, ORALLY DISINTEGRATING ORAL EVERY 6 HOURS PRN
OUTPATIENT
Start: 2024-10-10

## 2024-10-10 RX ORDER — LIDOCAINE HYDROCHLORIDE 20 MG/ML
INJECTION, SOLUTION INFILTRATION; PERINEURAL PRN
Status: DISCONTINUED | OUTPATIENT
Start: 2024-10-10 | End: 2024-10-10

## 2024-10-10 RX ORDER — ONDANSETRON 2 MG/ML
4 INJECTION INTRAMUSCULAR; INTRAVENOUS EVERY 6 HOURS PRN
OUTPATIENT
Start: 2024-10-10

## 2024-10-10 RX ORDER — PROCHLORPERAZINE MALEATE 10 MG
10 TABLET ORAL EVERY 6 HOURS PRN
OUTPATIENT
Start: 2024-10-10

## 2024-10-10 RX ORDER — NALOXONE HYDROCHLORIDE 0.4 MG/ML
0.2 INJECTION, SOLUTION INTRAMUSCULAR; INTRAVENOUS; SUBCUTANEOUS
OUTPATIENT
Start: 2024-10-10

## 2024-10-10 RX ORDER — NALOXONE HYDROCHLORIDE 0.4 MG/ML
0.4 INJECTION, SOLUTION INTRAMUSCULAR; INTRAVENOUS; SUBCUTANEOUS
OUTPATIENT
Start: 2024-10-10

## 2024-10-10 RX ORDER — PROPOFOL 10 MG/ML
INJECTION, EMULSION INTRAVENOUS CONTINUOUS PRN
Status: DISCONTINUED | OUTPATIENT
Start: 2024-10-10 | End: 2024-10-10

## 2024-10-10 RX ORDER — FLUMAZENIL 0.1 MG/ML
0.2 INJECTION, SOLUTION INTRAVENOUS
OUTPATIENT
Start: 2024-10-10 | End: 2024-10-10

## 2024-10-10 RX ADMIN — LIDOCAINE HYDROCHLORIDE 100 MG: 20 INJECTION, SOLUTION INFILTRATION; PERINEURAL at 07:29

## 2024-10-10 RX ADMIN — PROPOFOL 200 MCG/KG/MIN: 10 INJECTION, EMULSION INTRAVENOUS at 07:29

## 2024-10-10 NOTE — H&P
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)       Past Surgical History:    Past Surgical History:   Procedure Laterality Date    APPENDECTOMY      COLONOSCOPY N/A 05/21/2021    Procedure: COLONOSCOPY;  Surgeon: Hipolito Alexander MD;  Location: [facility]    IR LIVER BIOPSY PERCUTANEOUS  1/5/2024    PERCUTANEOUS BIOPSY LIVER N/A 1/5/2024    Procedure: Percutaneous biopsy liver;  Surgeon: Alvarez Alexander MD;  Location: [facility] OR       Past Medical History:   Past Medical History:   Diagnosis Date    Cirrhosis of liver (H)     H63D heterozygote    Diabetes mellitus, type 2 (H)     Hyperlipidemia        Social History:   Social History     Tobacco Use    Smoking status: Never     Passive exposure: Past    Smokeless tobacco: Never   Substance Use Topics    Alcohol use: Yes     Comment: rare       Family History:   Family History   Problem Relation Age of Onset    Diabetes Mother     Heart Disease Mother         stent placement.    Morbid Obesity Mother         bypass    Valvular heart disease Father     Diabetes Brother     Liver Disease No family hx of        Allergies: No Known Allergies    Active Medications:   Current Outpatient Medications   Medication Sig Dispense Refill    metFORMIN (GLUCOPHAGE XR) 500 MG 24 hr tablet Take 1 tablet (500 mg) by mouth daily (with dinner). 30 tablet 3    Vitamin E 90 MG (200 UNIT) capsule Take 200 Units by mouth daily      blood glucose (NO BRAND SPECIFIED) lancets standard Use to test blood sugar twice daily times daily or as directed. 200 Lancet 3    blood glucose (NO BRAND SPECIFIED) test strip Use to test blood sugar twice daily times daily or as  "directed. 200 strip 3    blood glucose monitoring (NO BRAND SPECIFIED) meter device kit Use to test blood sugar twice daily times daily or as directed. 1 kit 3    Continuous Glucose Sensor (DEXCOM G7 SENSOR) MISC 1 each every 10 days. Change sensor every 10 days 9 each 5    gabapentin (NEURONTIN) 300 MG capsule Take 1 capsule (300 mg) by mouth nightly as needed for neuropathic pain (Patient not taking: Reported on 9/18/2024) 30 capsule 3    SHINGRIX injection  (Patient not taking: Reported on 9/18/2024)         Systemic Review:   CONSTITUTIONAL: NEGATIVE for fever, chills, change in weight  ENT/MOUTH: NEGATIVE for ear, mouth and throat problems  RESP: NEGATIVE for significant cough or SOB  CV: NEGATIVE for chest pain, palpitations or peripheral edema    Physical Examination:   Vital Signs: /89 (BP Location: Right arm)   Pulse 68   Temp 97.4  F (36.3  C) (Temporal)   Resp 16   Ht 1.88 m (6' 2\")   Wt 86.2 kg (190 lb)   SpO2 97%   BMI 24.39 kg/m    GENERAL: healthy, alert and no distress  NECK: no adenopathy, no asymmetry, masses, or scars  RESP: lungs clear to auscultation - no rales, rhonchi or wheezes  CV: regular rate and rhythm, normal S1 S2, no S3 or S4, no murmur, click or rub, no peripheral edema and peripheral pulses strong  ABDOMEN: soft, nontender, no hepatosplenomegaly, no masses and bowel sounds normal  MS: no gross musculoskeletal defects noted, no edema      Plan: Appropriate to proceed as scheduled.      Piedad Smith MD  10/10/2024    PCP:  Raj Almazan    "

## 2024-10-10 NOTE — ANESTHESIA POSTPROCEDURE EVALUATION
Patient: Demetrius Burton    Procedure: Procedure(s):  ESOPHAGOGASTRODUODENOSCOPY, WITH BANDING OF VARICES       Anesthesia Type:  MAC    Note:  Disposition: Outpatient   Postop Pain Control: Uneventful            Sign Out: Well controlled pain   PONV: No   Neuro/Psych: Uneventful            Sign Out: Acceptable/Baseline neuro status   Airway/Respiratory: Uneventful            Sign Out: Acceptable/Baseline resp. status   CV/Hemodynamics: Uneventful            Sign Out: Acceptable CV status; No obvious hypovolemia; No obvious fluid overload   Other NRE: NONE   DID A NON-ROUTINE EVENT OCCUR?        Last vitals:  Vitals Value Taken Time   /87 10/10/24 0800   Temp 36.2  C (97.1  F) 10/10/24 0744   Pulse 92 10/10/24 0800   Resp 12 10/10/24 0800   SpO2 96 % 10/10/24 0800       Electronically Signed By: Kishor Woodward MD  October 10, 2024  8:27 AM

## 2024-10-10 NOTE — ANESTHESIA CARE TRANSFER NOTE
Patient: Demetrius Burton    Procedure: Procedure(s):  ESOPHAGOGASTRODUODENOSCOPY, WITH BANDING OF VARICES       Diagnosis: Cirrhosis of liver without ascites, unspecified hepatic cirrhosis type (H) [K74.60]  Diagnosis Additional Information: No value filed.    Anesthesia Type:   MAC     Note:      Level of Consciousness: awake  Oxygen Supplementation: room air    Independent Airway: airway patency satisfactory and stable        Patient transferred to: Phase II    Handoff Report: Identifed the Patient, Identified the Reponsible Provider, Reviewed the pertinent medical history, Discussed the surgical course, Reviewed Intra-OP anesthesia mangement and issues during anesthesia, Set expectations for post-procedure period and Allowed opportunity for questions and acknowledgement of understanding  Vitals:  Vitals Value Taken Time   BP     Temp 36.2  C (97.1  F) 10/10/24 0744   Pulse 84 10/10/24 0744   Resp 12 10/10/24 0744   SpO2 96 % 10/10/24 0744       Electronically Signed By: EDUARDO Bello CRNA  October 10, 2024  7:47 AM

## 2024-10-25 ENCOUNTER — HOSPITAL ENCOUNTER (OUTPATIENT)
Facility: CLINIC | Age: 59
Discharge: HOME | End: 2024-10-25
Attending: FAMILY MEDICINE
Payer: COMMERCIAL

## 2024-10-25 ENCOUNTER — OFFICE VISIT (OUTPATIENT)
Dept: FAMILY MEDICINE | Facility: CLINIC | Age: 59
End: 2024-10-25
Payer: COMMERCIAL

## 2024-10-25 ENCOUNTER — HOSPITAL ENCOUNTER (OUTPATIENT)
Facility: CLINIC | Age: 59
End: 2024-10-25
Attending: FAMILY MEDICINE
Payer: COMMERCIAL

## 2024-10-25 ENCOUNTER — HOSPITAL ENCOUNTER (OUTPATIENT)
Dept: MRI IMAGING | Facility: CLINIC | Age: 59
Discharge: HOME | End: 2024-10-25
Attending: PHYSICIAN ASSISTANT
Payer: COMMERCIAL

## 2024-10-25 ENCOUNTER — INFUSION THERAPY VISIT (OUTPATIENT)
Dept: INFUSION THERAPY | Facility: CLINIC | Age: 59
End: 2024-10-25
Attending: FAMILY MEDICINE
Payer: COMMERCIAL

## 2024-10-25 VITALS
TEMPERATURE: 98.3 F | BODY MASS INDEX: 25.05 KG/M2 | OXYGEN SATURATION: 97 % | HEIGHT: 74 IN | HEART RATE: 68 BPM | SYSTOLIC BLOOD PRESSURE: 122 MMHG | DIASTOLIC BLOOD PRESSURE: 74 MMHG | RESPIRATION RATE: 20 BRPM | WEIGHT: 195.2 LBS

## 2024-10-25 DIAGNOSIS — E83.110 HEREDITARY HEMOCHROMATOSIS (H): Primary | ICD-10-CM

## 2024-10-25 DIAGNOSIS — Z12.5 SCREENING FOR PROSTATE CANCER: ICD-10-CM

## 2024-10-25 DIAGNOSIS — E11.9 TYPE 2 DIABETES MELLITUS WITHOUT COMPLICATION, WITHOUT LONG-TERM CURRENT USE OF INSULIN (H): Primary | ICD-10-CM

## 2024-10-25 DIAGNOSIS — Z00.00 ROUTINE GENERAL MEDICAL EXAMINATION AT A HEALTH CARE FACILITY: ICD-10-CM

## 2024-10-25 DIAGNOSIS — E11.69 TYPE 2 DIABETES MELLITUS WITH OTHER SPECIFIED COMPLICATION, WITHOUT LONG-TERM CURRENT USE OF INSULIN (H): ICD-10-CM

## 2024-10-25 DIAGNOSIS — Z14.8 CARRIER OF HEMOCHROMATOSIS HFE GENE MUTATION: Chronic | ICD-10-CM

## 2024-10-25 DIAGNOSIS — Z13.220 LIPID SCREENING: ICD-10-CM

## 2024-10-25 DIAGNOSIS — K74.69 OTHER CIRRHOSIS OF LIVER (H): ICD-10-CM

## 2024-10-25 LAB
ANION GAP SERPL CALCULATED.3IONS-SCNC: 7 MMOL/L (ref 7–15)
BUN SERPL-MCNC: 14.6 MG/DL (ref 6–20)
CALCIUM SERPL-MCNC: 8.7 MG/DL (ref 8.8–10.4)
CHLORIDE SERPL-SCNC: 107 MMOL/L (ref 98–107)
CHOLEST SERPL-MCNC: 151 MG/DL
CREAT SERPL-MCNC: 0.7 MG/DL (ref 0.67–1.17)
CREAT UR-MCNC: 99.9 MG/DL
EGFRCR SERPLBLD CKD-EPI 2021: >90 ML/MIN/1.73M2
EST. AVERAGE GLUCOSE BLD GHB EST-MCNC: 200 MG/DL
FASTING STATUS PATIENT QL REPORTED: YES
FASTING STATUS PATIENT QL REPORTED: YES
GLUCOSE SERPL-MCNC: 194 MG/DL (ref 70–99)
HBA1C MFR BLD: 8.6 % (ref 0–5.6)
HCO3 SERPL-SCNC: 24 MMOL/L (ref 22–29)
HCT VFR BLD AUTO: 39.5 % (ref 40–53)
HDLC SERPL-MCNC: 41 MG/DL
HGB BLD-MCNC: 14.1 G/DL (ref 13.3–17.7)
LDLC SERPL CALC-MCNC: 88 MG/DL
MICROALBUMIN UR-MCNC: <12 MG/L
MICROALBUMIN/CREAT UR: NORMAL MG/G{CREAT}
NONHDLC SERPL-MCNC: 110 MG/DL
POTASSIUM SERPL-SCNC: 4.1 MMOL/L (ref 3.4–5.3)
PSA SERPL DL<=0.01 NG/ML-MCNC: 0.52 NG/ML (ref 0–3.5)
SODIUM SERPL-SCNC: 138 MMOL/L (ref 135–145)
TRIGL SERPL-MCNC: 108 MG/DL

## 2024-10-25 PROCEDURE — 99396 PREV VISIT EST AGE 40-64: CPT | Performed by: STUDENT IN AN ORGANIZED HEALTH CARE EDUCATION/TRAINING PROGRAM

## 2024-10-25 PROCEDURE — 99195 PHLEBOTOMY: CPT

## 2024-10-25 PROCEDURE — 80061 LIPID PANEL: CPT | Performed by: STUDENT IN AN ORGANIZED HEALTH CARE EDUCATION/TRAINING PROGRAM

## 2024-10-25 PROCEDURE — 36415 COLL VENOUS BLD VENIPUNCTURE: CPT | Performed by: STUDENT IN AN ORGANIZED HEALTH CARE EDUCATION/TRAINING PROGRAM

## 2024-10-25 PROCEDURE — 258N000003 HC RX IP 258 OP 636: Performed by: INTERNAL MEDICINE

## 2024-10-25 PROCEDURE — 82570 ASSAY OF URINE CREATININE: CPT | Performed by: STUDENT IN AN ORGANIZED HEALTH CARE EDUCATION/TRAINING PROGRAM

## 2024-10-25 PROCEDURE — 99213 OFFICE O/P EST LOW 20 MIN: CPT | Mod: 25 | Performed by: STUDENT IN AN ORGANIZED HEALTH CARE EDUCATION/TRAINING PROGRAM

## 2024-10-25 PROCEDURE — 82043 UR ALBUMIN QUANTITATIVE: CPT | Performed by: STUDENT IN AN ORGANIZED HEALTH CARE EDUCATION/TRAINING PROGRAM

## 2024-10-25 PROCEDURE — 83036 HEMOGLOBIN GLYCOSYLATED A1C: CPT | Performed by: STUDENT IN AN ORGANIZED HEALTH CARE EDUCATION/TRAINING PROGRAM

## 2024-10-25 PROCEDURE — G0103 PSA SCREENING: HCPCS | Performed by: STUDENT IN AN ORGANIZED HEALTH CARE EDUCATION/TRAINING PROGRAM

## 2024-10-25 PROCEDURE — 85018 HEMOGLOBIN: CPT | Performed by: STUDENT IN AN ORGANIZED HEALTH CARE EDUCATION/TRAINING PROGRAM

## 2024-10-25 PROCEDURE — 85014 HEMATOCRIT: CPT | Performed by: STUDENT IN AN ORGANIZED HEALTH CARE EDUCATION/TRAINING PROGRAM

## 2024-10-25 PROCEDURE — 80048 BASIC METABOLIC PNL TOTAL CA: CPT | Performed by: STUDENT IN AN ORGANIZED HEALTH CARE EDUCATION/TRAINING PROGRAM

## 2024-10-25 RX ORDER — HEPARIN SODIUM,PORCINE 10 UNIT/ML
5-20 VIAL (ML) INTRAVENOUS DAILY PRN
OUTPATIENT
Start: 2024-10-25

## 2024-10-25 RX ORDER — METFORMIN HYDROCHLORIDE 500 MG/1
500 TABLET, EXTENDED RELEASE ORAL
Qty: 90 TABLET | Refills: 3 | Status: SHIPPED | OUTPATIENT
Start: 2024-10-25

## 2024-10-25 RX ORDER — HEPARIN SODIUM (PORCINE) LOCK FLUSH IV SOLN 100 UNIT/ML 100 UNIT/ML
5 SOLUTION INTRAVENOUS
OUTPATIENT
Start: 2024-10-25

## 2024-10-25 RX ADMIN — SODIUM CHLORIDE 500 ML: 9 INJECTION, SOLUTION INTRAVENOUS at 15:10

## 2024-10-25 SDOH — HEALTH STABILITY: PHYSICAL HEALTH: ON AVERAGE, HOW MANY DAYS PER WEEK DO YOU ENGAGE IN MODERATE TO STRENUOUS EXERCISE (LIKE A BRISK WALK)?: 3 DAYS

## 2024-10-25 ASSESSMENT — SOCIAL DETERMINANTS OF HEALTH (SDOH): HOW OFTEN DO YOU GET TOGETHER WITH FRIENDS OR RELATIVES?: ONCE A WEEK

## 2024-10-25 ASSESSMENT — PAIN SCALES - GENERAL: PAINLEVEL_OUTOF10: NO PAIN (0)

## 2024-10-25 NOTE — PROGRESS NOTES
"Preventive Care Visit  [facility]  CK POWELL MD, Family Medicine  Oct 25, 2024      Assessment & Plan     Routine general medical examination at a health care facility  Lipid screening  - Lipid panel reflex to direct LDL Fasting; Future  - Lipid panel reflex to direct LDL Fasting    Screening for prostate cancer  - PSA, screen; Future  - PSA, screen    Type 2 diabetes mellitus without complication, without long-term current use of insulin (H)  - OPTOMETRY REFERRAL; Future  - metFORMIN (GLUCOPHAGE XR) 500 MG 24 hr tablet; Take 1 tablet (500 mg) by mouth daily (with dinner).  - HEMOGLOBIN A1C  - Albumin Random Urine Quantitative with Creat Ratio  - Basic metabolic panel  (Ca, Cl, CO2, Creat, Gluc, K, Na, BUN)    Patient has been advised of split billing requirements and indicates understanding: Yes        Yuval Romo is a 59 year old, presenting for the following:  Diabetes, Physical, and Health Maintenance (Declines vaccinations)        10/25/2024     7:45 AM   Additional Questions   Roomed by Evonne SHEN LPN   Accompanied by self         10/25/2024     7:45 AM   Patient Reported Additional Medications   Patient reports taking the following new medications no new meds          HPI      Diabetes Follow-up    How often are you checking your blood sugar? \"Only when I'm feeling weird\"  What concerns do you have today about your diabetes? None   Do you have any of these symptoms? (Select all that apply)  Numbness in feet  Have you had a diabetic eye exam in the last 12 months? No    States his highest BG was 256 and lowest BG was in the \"high 80s\" in the past month   Denies any symptoms associated with hypoglycemia   Patient's insurance denied coverage for a CGM       BP Readings from Last 2 Encounters:   10/25/24 122/74   10/10/24 106/87     Hemoglobin A1C (%)   Date Value   03/27/2024 7.6 (H)   12/04/2023 5.9 (H)     LDL Cholesterol Calculated (mg/dL)   Date Value   05/05/2023 72 "   10/27/2017 78   11/04/2016 82       Health Care Directive  Patient does not have a Health Care Directive: Patient states has Advance Directive and will bring in a copy to clinic.      10/25/2024   General Health   How would you rate your overall physical health? Good   Feel stress (tense, anxious, or unable to sleep) Not at all            10/25/2024   Nutrition   Three or more servings of calcium each day? (!) I DON'T KNOW   Diet: Diabetic   How many servings of fruit and vegetables per day? (!) 2-3   How many sweetened beverages each day? 0-1            10/25/2024   Exercise   Days per week of moderate/strenous exercise 3 days            10/25/2024   Social Factors   Frequency of gathering with friends or relatives Once a week   Worry food won't last until get money to buy more No   Food not last or not have enough money for food? No   Do you have housing? (Housing is defined as stable permanent housing and does not include staying ouside in a car, in a tent, in an abandoned building, in an overnight shelter, or couch-surfing.) Yes   Are you worried about losing your housing? No   Lack of transportation? No   Unable to get utilities (heat,electricity)? No            10/25/2024   Fall Risk   Fallen 2 or more times in the past year? No    Trouble with walking or balance? No        Patient-reported          10/25/2024   Dental   Dentist two times every year? Yes            10/25/2024   TB Screening   Were you born outside of the [place]? No        Today's PHQ-2 Score:       10/25/2024     7:54 AM   PHQ-2 ( 1999 [employer])   Q1: Little interest or pleasure in doing things 0   Q2: Feeling down, depressed or hopeless 0   PHQ-2 Score 0         10/25/2024   Substance Use   Alcohol more than 3/day or more than 7/wk Not Applicable   Do you use any other substances recreationally? No        Social History     Tobacco Use    Smoking status: Never     Passive exposure: Past    Smokeless tobacco: Never   Vaping Use    Vaping status:  "Never Used   Substance Use Topics    Alcohol use: Yes     Comment: rare    Drug use: No           10/25/2024   STI Screening   New sexual partner(s) since last STI/HIV test? No      Last PSA:   Prostate Specific Antigen Screen   Date Value Ref Range Status   05/05/2023 0.37 0.00 - 3.50 ng/mL Final     ASCVD Risk   The 10-year ASCVD risk score (Paulie ARMANDO, et al., 2019) is: 10.7%    Values used to calculate the score:      Age: 59 years      Sex: Male      Is Non- : No      Diabetic: Yes      Tobacco smoker: No      Systolic Blood Pressure: 122 mmHg      Is BP treated: No      HDL Cholesterol: 36 mg/dL      Total Cholesterol: 131 mg/dL         Reviewed and updated as needed this visit by Provider                    Review of Systems   Constitutional:  Negative for chills and fever.   HENT:  Negative for ear pain.    Eyes:  Negative for pain.   Respiratory:  Negative for cough.    Cardiovascular:  Negative for chest pain.   Gastrointestinal:  Negative for abdominal pain.   Genitourinary:  Negative for dysuria.   Musculoskeletal:  Negative for neck pain.   Skin:  Negative for rash.   Neurological:  Negative for headaches.          Objective    Exam  /74 (BP Location: Right arm, Patient Position: Sitting, Cuff Size: Adult Regular)   Pulse 68   Temp 98.3  F (36.8  C) (Tympanic)   Resp 20   Ht 1.88 m (6' 2\")   Wt 88.5 kg (195 lb 3.2 oz)   SpO2 97%   BMI 25.06 kg/m     Estimated body mass index is 25.06 kg/m  as calculated from the following:    Height as of this encounter: 1.88 m (6' 2\").    Weight as of this encounter: 88.5 kg (195 lb 3.2 oz).    Physical Exam  Constitutional:       General: He is not in acute distress.  HENT:      Head: Normocephalic and atraumatic.      Right Ear: External ear normal.      Left Ear: External ear normal.      Mouth/Throat:      Mouth: Mucous membranes are moist.      Pharynx: Oropharynx is clear. No oropharyngeal exudate or posterior " oropharyngeal erythema.   Eyes:      Extraocular Movements: Extraocular movements intact.   Cardiovascular:      Rate and Rhythm: Normal rate and regular rhythm.      Heart sounds: Normal heart sounds.   Pulmonary:      Effort: Pulmonary effort is normal. No respiratory distress.      Breath sounds: Normal breath sounds. No wheezing or rhonchi.   Abdominal:      Palpations: Abdomen is soft. There is no mass.      Tenderness: There is no abdominal tenderness.   Musculoskeletal:         General: No deformity. Normal range of motion.      Cervical back: Normal range of motion and neck supple.   Skin:     General: Skin is warm.      Findings: No rash.   Neurological:      General: No focal deficit present.      Mental Status: He is alert and oriented to person, place, and time.   Psychiatric:         Mood and Affect: Mood normal.                Signed Electronically by: CK POWELL MD

## 2024-10-25 NOTE — PROGRESS NOTES
Infusion Nursing Note:  Demetrius Burton presents today for phlebotomy.    Patient seen by provider today: No   present during visit today: Not Applicable.    Note: 500mL whole blood removed from left upper arm using 18 gauge IV. 500mL fluid given post phlebotomy.     Pt did feel slightly lighteaded prison through phlebotomy. Stopped flow of blood using clamp on extension tubing off IV, laid pt back in recliner and allowed to rest for a few minutes. Pt felt better after resting, unclamped and resumed phlebotomy. Pt tolerated well.       Intravenous Access:  Peripheral IV placed.    Treatment Conditions:  Lab Results   Component Value Date    HGB 14.1 10/25/2024    WBC 2.9 (L) 03/27/2024    ANEUTAUTO 1.7 03/04/2024    PLT 55 (L) 03/27/2024        Results reviewed, labs MET treatment parameters, ok to proceed with treatment.      Post Infusion Assessment:  Patient tolerated infusion without incident.  Blood return noted pre and post infusion.  Site patent and intact, free from redness, edema or discomfort.  Access discontinued per protocol.       Discharge Plan:   Copy of AVS reviewed with patient and/or family.  Patient will return November 2024 for next appointment.  Patient discharged in stable condition accompanied by: self.  Departure Mode: Ambulatory.      SAY BRADEN RN

## 2024-10-25 NOTE — PATIENT INSTRUCTIONS
Patient Education   Preventive Care Advice   This is general advice given by our system to help you stay healthy. However, your care team may have specific advice just for you. Please talk to your care team about your preventive care needs.  Nutrition  Eat 5 or more servings of fruits and vegetables each day.  Try wheat bread, brown rice and whole grain pasta (instead of white bread, rice, and pasta).  Get enough calcium and vitamin D. Check the label on foods and aim for 100% of the RDA (recommended daily allowance).  Lifestyle  Exercise at least 150 minutes each week  (30 minutes a day, 5 days a week).  Do muscle strengthening activities 2 days a week. These help control your weight and prevent disease.  No smoking.  Wear sunscreen to prevent skin cancer.  Have a dental exam and cleaning every 6 months.  Yearly exams  See your health care team every year to talk about:  Any changes in your health.  Any medicines your care team has prescribed.  Preventive care, family planning, and ways to prevent chronic diseases.  Shots (vaccines)   HPV shots (up to age 26), if you've never had them before.  Hepatitis B shots (up to age 59), if you've never had them before.  COVID-19 shot: Get this shot when it's due.  Flu shot: Get a flu shot every year.  Tetanus shot: Get a tetanus shot every 10 years.  Pneumococcal, hepatitis A, and RSV shots: Ask your care team if you need these based on your risk.  Shingles shot (for age 50 and up)  General health tests  Diabetes screening:  Starting at age 35, Get screened for diabetes at least every 3 years.  If you are younger than age 35, ask your care team if you should be screened for diabetes.  Cholesterol test: At age 39, start having a cholesterol test every 5 years, or more often if advised.  Bone density scan (DEXA): At age 50, ask your care team if you should have this scan for osteoporosis (brittle bones).  Hepatitis C: Get tested at least once in your life.  STIs (sexually  transmitted infections)  Before age 24: Ask your care team if you should be screened for STIs.  After age 24: Get screened for STIs if you're at risk. You are at risk for STIs (including HIV) if:  You are sexually active with more than one person.  You don't use condoms every time.  You or a partner was diagnosed with a sexually transmitted infection.  If you are at risk for HIV, ask about PrEP medicine to prevent HIV.  Get tested for HIV at least once in your life, whether you are at risk for HIV or not.  Cancer screening tests  Cervical cancer screening: If you have a cervix, begin getting regular cervical cancer screening tests starting at age 21.  Breast cancer scan (mammogram): If you've ever had breasts, begin having regular mammograms starting at age 40. This is a scan to check for breast cancer.  Colon cancer screening: It is important to start screening for colon cancer at age 45.  Have a colonoscopy test every 10 years (or more often if you're at risk) Or, ask your provider about stool tests like a FIT test every year or Cologuard test every 3 years.  To learn more about your testing options, visit:   .  For help making a decision, visit:   https://bit.ly/fj82429.  Prostate cancer screening test: If you have a prostate, ask your care team if a prostate cancer screening test (PSA) at age 55 is right for you.  Lung cancer screening: If you are a current or former smoker ages 50 to 80, ask your care team if ongoing lung cancer screenings are right for you.  For informational purposes only. Not to replace the advice of your health care provider. Copyright   2023 [employer]. All rights reserved. Clinically reviewed by the [facility] Transitions Program. [employer] [id number] - REV 01/24.

## 2024-10-28 DIAGNOSIS — E11.9 TYPE 2 DIABETES MELLITUS WITHOUT COMPLICATION, WITHOUT LONG-TERM CURRENT USE OF INSULIN (H): Primary | ICD-10-CM

## 2024-10-28 RX ORDER — ATORVASTATIN CALCIUM 40 MG/1
40 TABLET, FILM COATED ORAL DAILY
Qty: 90 TABLET | Refills: 3 | Status: SHIPPED | OUTPATIENT
Start: 2024-10-28

## 2024-10-28 NOTE — RESULT ENCOUNTER NOTE
Isidro Burton,     Based on your lab work, you are not suspected to have prostate cancer. Your urine albumin creatinine labs are within normal limits.     With your elevated A1c and lipid panel results you really should be on a statin/cholesterol medication. I will send a prescription over for a medication called lipitor/atorvastatin 40mg at bedtime to your pharmacy. If you take this medication and notice side effects like muscle aches please let me know as medication changes will be needed.     Finally, based on your A1c levels, please make an appointment with me as we will have to discuss changes we can make in your diabetes management given that your A1c is likely higher than what is reported in the setting of your infusion therapy.       Sincerely,     Alfreda Salas MD

## 2024-10-30 ENCOUNTER — TELEPHONE (OUTPATIENT)
Dept: GASTROENTEROLOGY | Facility: CLINIC | Age: 59
End: 2024-10-30
Payer: COMMERCIAL

## 2024-10-30 NOTE — TELEPHONE ENCOUNTER
"Endoscopy Scheduling Screen    Have you had any respiratory illness or flu-like symptoms in the last 10 days?  No    What is your communication preference for Instructions and/or Bowel Prep?   MyChart    What insurance is in the chart?  Other:  [employer]    Ordering/Referring Provider: Piedad Smith MD   (If ordering provider performs procedure, schedule with ordering provider unless otherwise instructed. )    BMI: Estimated body mass index is 25.06 kg/m  as calculated from the following:    Height as of 10/25/24: 1.88 m (6' 2\").    Weight as of 10/25/24: 88.5 kg (195 lb 3.2 oz).     Sedation Ordered  MAC/deep sedation.   BMI<= 45 45 < BMI <= 48 48 < BMI < = 50  BMI > 50   No Restrictions No [facility]  No [facility]  [facility] with exceptions Hospital Only OR Only       Do you have a history of malignant hyperthermia?  No    (Females) Are you currently pregnant?   No     Have you been diagnosed or told you have pulmonary hypertension?   No    Do you have an LVAD?  No    Have you been told you have moderate to severe sleep apnea?  No.    Have you been told you have COPD, asthma, or any other lung disease?  No    Do you have any heart conditions?  No     Have you ever had or are you waiting for an organ transplant?  No. Continue scheduling, no site restrictions.    Have you had a stroke or transient ischemic attack (TIA aka \"mini stroke\" in the last 6 months?   No    Have you been diagnosed with or been told you have cirrhosis of the liver?   Yes. (RN Review required for scheduling unless scheduling in Hospital.)    Are you currently on dialysis?   No    Do you need assistance transferring?   No    BMI: Estimated body mass index is 25.06 kg/m  as calculated from the following:    Height as of 10/25/24: 1.88 m (6' 2\").    Weight as of 10/25/24: 88.5 kg (195 lb 3.2 oz).     Is patients BMI > 40 and scheduling location [facility]?  No    Do you take an injectable or oral medication for weight loss or diabetes (excluding insulin)?  Yes, " hold time can be up to 7 days. Please consult with you prescribing provider to discuss endoscopy recommendations. (Please schedule at least 7 days out.)    Do you take the medication Naltrexone?  No    Do you take blood thinners?  No       Prep   Are you currently on dialysis or do you have chronic kidney disease?  No    Do you have a diagnosis of diabetes?  Yes (Golytely Prep)    Do you have a diagnosis of cystic fibrosis (CF)?  No    On a regular basis do you go 3 -5 days between bowel movements?  No    BMI > 40?  No    Preferred Pharmacy:    [facility] [id number] [address] [address] [place] [address] [address] [facility] [address]  Phone: [phone] Fax: [phone]      Final Scheduling Details     Procedure scheduled  Upper endoscopy (EGD)    Surgeon:  Sarah     Date of procedure:  02/20/2025     Pre-OP / PAC:   No - Not required for this site.    Location  [facility] - Per order.- RN triage cleared for all locations    Sedation   MAC/Deep Sedation - Per order.      Patient Reminders:   You will receive a call from a Nurse to review instructions and health history.  This assessment must be completed prior to your procedure.  Failure to complete the Nurse assessment may result in the procedure being cancelled.      On the day of your procedure, please designate an adult(s) who can drive you home stay with you for the next 24 hours. The medicines used in the exam will make you sleepy. You will not be able to drive.      You cannot take public transportation, ride share services, or non-medical taxi service without a responsible caregiver.  Medical transport services are allowed with the requirement that a responsible caregiver will receive you at your destination.  We require that drivers and caregivers are confirmed prior to your procedure.

## 2024-10-31 NOTE — RESULT ENCOUNTER NOTE
Looks like the patient has not seen their results. Could someone please notify the patient of their results and recommendations?     Thank you so much!     Sincerely,     Alfreda Salas MD

## 2024-11-07 DIAGNOSIS — K74.60 HEPATIC CIRRHOSIS, UNSPECIFIED HEPATIC CIRRHOSIS TYPE, UNSPECIFIED WHETHER ASCITES PRESENT (H): ICD-10-CM

## 2024-11-07 DIAGNOSIS — E83.110 HEREDITARY HEMOCHROMATOSIS (H): Primary | ICD-10-CM

## 2024-11-07 DIAGNOSIS — E11.40 TYPE 2 DIABETES MELLITUS WITH DIABETIC NEUROPATHY, WITHOUT LONG-TERM CURRENT USE OF INSULIN (H): ICD-10-CM

## 2024-11-07 DIAGNOSIS — E83.19 IRON OVERLOAD: ICD-10-CM

## 2024-11-07 NOTE — PROGRESS NOTES
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   Demetrius has tolerated phlebotomies well except with the IV saline shortage he did get lightheaded after a phlebotomy.  I really want him to receive IV saline after each phlebotomy and the orders will reflect that and the chart.  I would like to monitor with standing orders CBC CMP ferritin iron and TIBC as his ferritin has come down nicely and I like to target his ferritin 2 oh around 100.  He asked me if he could eat venison and I said you as long as it is not 3 times a day 7 days a week.  He continues to follow for his diabetes as well as his lipids.  He is going to see dermatology for the skin lesions on his shin which I think are related to stasis dermatitis.  I will monitor his ferritin with his phlebotomies every 3 weeks at [place] and advised him when he could stop.  I suggested that we put in orders for [place] every 3 weeks through 1 February and then make adjustments thereafter.  Summary of Recommendations:  Phlebotomy  every 2 to 3 weeks for hematocrit>33% at [place] to remove iron which may decrease inflammation in his liver.   Avoid ASA  Good diabetes control, Ophtho exam  Vitamin E 200 units a day as an antioxidant  RTC in 6months  Thank you for involving us in the care of this patient.   I spent a total of 30 minutes face to face with Demetrius Burton during today's office visit. Over 50% of  this time was spent counseling the patient and coordinating the care regarding iron overload and cirrhoisis and 10minutes preparing to see the patient and  care coordination     Kishor Andrea MD  [phone]          History of Present Illness:   History obtained from chart review and confirmed with patient.    Demetrius Burton is a 59 year old who was in good health until earlier this year when he was diagnosed with diabetes in the setting of a 30 pound unintentional weight loss.  He was started on metformin and jardiance and his hemoglobin A1c dropped from 9.5% to 5.9%.  The jardiance was discontinued. He also has noticed some neuropathy in his feet with pain to touch in his toes and top of his feet. He has had COVID-19 twice in the last couple of years.  His first diagnosis was in February 2022  which lasted  a month treated with antibiotics and steroids.  He then got COVID-19 again in September 2023.    He denies any history of jaundice, abdominal swelling, lower extremity swelling, slowness of thought or confusion.  Denies any diarrhea or constipation, melena or hematochezia.  He denies any nausea or vomiting in the setting of his unintentional weight loss.He denies any known family history of liver disease.  He has several family members with diabetes.  He denies any over-the-counter medications or supplements except for a multivitamin.  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 Throughout his life he has not had issues with alcohol overuse; he drinks about 1 alcohol-containing beverage per month.He was seen by Dr Lambert in Hepatology. CRISTINA , anti actin and hepatitis serologies were negative. He did have an [id number] heterozygosity and an alpha 1 anti trypsin S heterozygosity. No tylenol abuse.    INTERVAL HISTORY.   Less pain in feet and toes.He is taking Vit E 200u/d  He has had phlebotomies every 3 weeks at [place] .He does get light headed after the phleb and saline infusion  helps. Last phlebotomy Oct 25  and ferritin was 247 No significant alcohol.Continues  on metformiin Recently considered to restart atorvastatin.No black or blood stools.    Had EGD Oct 10:  Grade II esophageal varices. Incompletely                          eradicated. Banded.                          - Portal hypertensive gastropathy.                          - Hematin (altered blood/coffee-ground-like material)                          in the gastric antrum and in the gastric fundus.                          - Normal examined duodenum.                          - No specimens collected.     A comprehensive ROS was performed with the patient and was found to be negative with the exception of that noted in the HPI above.  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  PSYCH Mood ok  SKIN He sunburns easily no skin cancer  ID Had Covid in 2022   with pneumonia with difficulty breathing on antibiotics and steroids lasted a month Had COVID in Oct 2023 lasted a few days no paxlovid  HEME No anemia, no DVT Eats meat was eating venison, not using iron pans less Vit C No blood transfusions No bleeding or petechiae He has had nose bleeding   No UTI occ nocturia         Past Medical History:     Past Medical History:   Diagnosis Date    Cirrhosis of liver (H)     H63D heterozygote    Diabetes mellitus, type 2 (H)     Hyperlipidemia             Past Surgical History:      Past Surgical History:   Procedure Laterality Date    APPENDECTOMY      COLONOSCOPY N/A 05/21/2021    Procedure: COLONOSCOPY;  Surgeon: Hipolito Alexander MD;  Location: [facility]    IR LIVER BIOPSY PERCUTANEOUS  1/5/2024    PERCUTANEOUS BIOPSY LIVER N/A 1/5/2024    Procedure: Percutaneous biopsy liver;  Surgeon: Alvarez Alexander MD;  [place] [facility] [place]   Had small bowel resection with appendectomy 25 years ago         Social History:   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 His wife Francheska also works for [employer]  Social History     Socioeconomic History    Marital status:      Spouse name: Not on file    Number of children: Not on file    Years of education: Not on file    Highest education level: Not on file   Occupational History    Not on file   Tobacco Use    Smoking status: Never     Passive exposure: Past    Smokeless tobacco: Never   Vaping Use    Vaping status: Never Used   Substance and Sexual Activity    Alcohol use: Yes     Comment: rare    Drug use: No    Sexual activity: Not on file   Other Topics Concern    Parent/sibling w/ CABG, MI or angioplasty before 65F 55M? No   Social History Narrative    Not on file     Social Drivers of Health     Financial Resource Strain: Low Risk  (10/25/2024)    Financial Resource Strain     Within the past 12 months, have you or your family members you live with been unable to get utilities (heat, electricity) when it was really needed?: No   Food Insecurity: Low Risk  (10/25/2024)    Food Insecurity     Within the past 12 months, did you worry that your food would run out before you got money to buy more?: No     Within the past 12 months, did the food you bought just not last and you didn t have money to get more?: No   Transportation Needs: Low Risk  (10/25/2024)    Transportation Needs     Within the past 12 months, has lack of transportation kept you from medical appointments, getting your medicines, non-medical meetings or appointments, work, or from getting things  that you need?: No   Physical Activity: Unknown (10/25/2024)    Exercise Vital Sign     Days of Exercise per Week: 3 days     Minutes of Exercise per Session: Not on file   Stress: No Stress Concern Present (10/25/2024)    [employer] - Occupational Stress Questionnaire     Feeling of Stress : Not at all   Social Connections: Unknown (10/25/2024)    Social Connection and Isolation Panel [NHANES]     Frequency of Communication with Friends and Family: Not on file     Frequency of Social Gatherings with Friends and Family: Once a week     Attends Pentec[employer]ism Services: Not on file     Active Member of Clubs or Organizations: Not on file     Attends Club or Organization Meetings: Not on file     Marital Status: Not on file   Interpersonal Safety: Low Risk  (10/25/2024)    Interpersonal Safety     Do you feel physically and emotionally safe where you currently live?: Yes     Within the past 12 months, have you been hit, slapped, kicked or otherwise physically hurt by someone?: No     Within the past 12 months, have you been humiliated or emotionally abused in other ways by your partner or ex-partner?: No   Housing Stability: Low Risk  (10/25/2024)    Housing Stability     Do you have housing? : Yes     Are you worried about losing your housing?: No            Family History:   [place] ancestry  Family History   Problem Relation Age of Onset    Diabetes Mother     Heart Disease Mother         stent placement.    Morbid Obesity Mother         bypass    Valvular heart disease Father     Diabetes Brother     Liver Disease No family hx of    Has 2 children 29 Fabricio and Ric 27 both healthy  Younger brother has DM and skin cancer  No hx of iron overload  Father had nerve deafness and older brother had hearing aids in his 20s. No kidney issues       Allergies:   No Known Allergies         Medications:       PHYSICAL EXAMINATION:   GENERAL:  Patient is alert in no acute distress.  VITAL SIGNS: BP  124/72 (BP Location: Right arm, Patient Position: Sitting, Cuff Size: Adult Regular)   Pulse 71   Temp 97.7  F (36.5  C) (Oral)   Resp 16   Wt 89.9 kg (198 lb 4.8 oz)   SpO2 98%   BMI 25.46 kg/m     HEENT:  Normocephalic.  EOM okay, no scleral icterus.  Mouth without lesion.   RESPIRATORY:  Clear to percussion and auscultation.   CARDIAC:  Normal sinus rhythm.  No S3, S4 or murmur.   ABDOMEN:  Soft spleen tip palpable BCM     EXTREMITIES: no edema.   NEUROLOGIC:  Grossly normal.   MS No arthritis  SKIN Stasis dermatitis right lower leg. Abrasion left shin  PSYCH Mood appropriate             Data:   I have personally reviewed the following labs/imaging:  CBC@LABRCNTIPR(wbc:4,rbc:4,hgb:4,hct:4,mcv:4,mch:4,mchc:4,rdw:4,plt:4)@  CMP@LABRCNTIPR(na:4,potassium:4,chloride:4,co2:4,aniongap:4,g,bun:4,cr:4,gfrestimated:4,gfrestblack:4,hiram:4,ma,phos:4,prottotal:4,albumin:4,bilitotal:4,alkphos:   Latest Reference Range & Units 24 07:56   WBC 4.0 - 11.0 10e3/uL 2.3 (L)   Hemoglobin 13.3 - 17.7 g/dL 13.2 (L)   Hematocrit 40.0 - 53.0 % 37.5 (L)   Platelet Count 150 - 450 10e3/uL 50 (L)   RBC Count 4.40 - 5.90 10e6/uL 3.83 (L)   MCV 78 - 100 fL 98   MCH 26.5 - 33.0 pg 34.5 (H)   MCHC 31.5 - 36.5 g/dL 35.2   RDW 10.0 - 15.0 % 13.0   % Neutrophils % 53   % Lymphocytes % 32   % Monocytes % 10   % Eosinophils % 4   % Basophils % 1   Absolute Basophils 0.0 - 0.2 10e3/uL 0.0   Absolute Eosinophils 0.0 - 0.7 10e3/uL 0.1   Absolute Immature Granulocytes <=0.4 10e3/uL 0.0   Absolute Lymphocytes 0.8 - 5.3 10e3/uL 0.7 (L)   Absolute Monocytes 0.0 - 1.3 10e3/uL 0.2   % Immature Granulocytes % 0   Absolute Neutrophils 1.6 - 8.3 10e3/uL 1.2 (L)   Absolute NRBCs 10e3/uL 0.0   NRBCs per 100 WBC <1 /100 0   INR 0.85 - 1.15  1.28 (H)   (L): Data is abnormally low  (H): Data is abnormally high   Latest Reference Range & Units 24 07:56   Sodium 135 - 145 mmol/L 141   Potassium 3.4 - 5.3 mmol/L 4.2   Chloride 98 - 107 mmol/L  110 (H)   Carbon Dioxide (CO2) 22 - 29 mmol/L 22   Urea Nitrogen 6.0 - 20.0 mg/dL 11.8   Creatinine 0.67 - 1.17 mg/dL 0.70   GFR Estimate >60 mL/min/1.73m2 >90   Calcium 8.8 - 10.4 mg/dL 8.6 (L)   Anion Gap 7 - 15 mmol/L 9   Albumin 3.5 - 5.2 g/dL 3.5   Protein Total 6.4 - 8.3 g/dL 6.6   Alkaline Phosphatase 40 - 150 U/L 179 (H)   ALT 0 - 70 U/L 50   AST 0 - 45 U/L 59 (H)   Bilirubin Direct 0.00 - 0.30 mg/dL 0.37 (H)   Bilirubin Total <=1.2 mg/dL 1.0   CRP Inflammation <5.00 mg/L <3.00   Ferritin 31 - 409 ng/mL 207   Glucose 70 - 99 mg/dL 186 (H)   Iron 61 - 157 ug/dL 62   Iron Binding Capacity 240 - 430 ug/dL 282   Iron Sat Index 15 - 46 % 22   (H): Data is abnormally high  (L): Data is abnormally low

## 2024-11-12 ENCOUNTER — APPOINTMENT (OUTPATIENT)
Dept: LAB | Facility: CLINIC | Age: 59
End: 2024-11-12
Attending: INTERNAL MEDICINE
Payer: COMMERCIAL

## 2024-11-12 ENCOUNTER — ONCOLOGY VISIT (OUTPATIENT)
Dept: TRANSPLANT | Facility: CLINIC | Age: 59
End: 2024-11-12
Attending: INTERNAL MEDICINE
Payer: COMMERCIAL

## 2024-11-12 VITALS
TEMPERATURE: 97.7 F | OXYGEN SATURATION: 98 % | WEIGHT: 198.3 LBS | HEART RATE: 71 BPM | BODY MASS INDEX: 25.46 KG/M2 | DIASTOLIC BLOOD PRESSURE: 72 MMHG | SYSTOLIC BLOOD PRESSURE: 124 MMHG | RESPIRATION RATE: 16 BRPM

## 2024-11-12 DIAGNOSIS — E83.19 IRON OVERLOAD: ICD-10-CM

## 2024-11-12 DIAGNOSIS — K74.60 HEPATIC CIRRHOSIS, UNSPECIFIED HEPATIC CIRRHOSIS TYPE, UNSPECIFIED WHETHER ASCITES PRESENT (H): ICD-10-CM

## 2024-11-12 DIAGNOSIS — E11.40 TYPE 2 DIABETES MELLITUS WITH DIABETIC NEUROPATHY, WITHOUT LONG-TERM CURRENT USE OF INSULIN (H): ICD-10-CM

## 2024-11-12 DIAGNOSIS — E83.110 HEREDITARY HEMOCHROMATOSIS (H): ICD-10-CM

## 2024-11-12 LAB
AFP SERPL-MCNC: 10.9 NG/ML
ALBUMIN SERPL BCG-MCNC: 3.5 G/DL (ref 3.5–5.2)
ALP SERPL-CCNC: 179 U/L (ref 40–150)
ALT SERPL W P-5'-P-CCNC: 50 U/L (ref 0–70)
ANION GAP SERPL CALCULATED.3IONS-SCNC: 9 MMOL/L (ref 7–15)
AST SERPL W P-5'-P-CCNC: 59 U/L (ref 0–45)
BASOPHILS # BLD AUTO: 0 10E3/UL (ref 0–0.2)
BASOPHILS NFR BLD AUTO: 1 %
BILIRUB DIRECT SERPL-MCNC: 0.37 MG/DL (ref 0–0.3)
BILIRUB SERPL-MCNC: 1 MG/DL
BUN SERPL-MCNC: 11.8 MG/DL (ref 6–20)
CALCIUM SERPL-MCNC: 8.6 MG/DL (ref 8.8–10.4)
CHLORIDE SERPL-SCNC: 110 MMOL/L (ref 98–107)
CREAT SERPL-MCNC: 0.7 MG/DL (ref 0.67–1.17)
CRP SERPL-MCNC: <3 MG/L
EGFRCR SERPLBLD CKD-EPI 2021: >90 ML/MIN/1.73M2
EOSINOPHIL # BLD AUTO: 0.1 10E3/UL (ref 0–0.7)
EOSINOPHIL NFR BLD AUTO: 4 %
ERYTHROCYTE [DISTWIDTH] IN BLOOD BY AUTOMATED COUNT: 13 % (ref 10–15)
FERRITIN SERPL-MCNC: 207 NG/ML (ref 31–409)
GLUCOSE SERPL-MCNC: 186 MG/DL (ref 70–99)
HCO3 SERPL-SCNC: 22 MMOL/L (ref 22–29)
HCT VFR BLD AUTO: 37.5 % (ref 40–53)
HGB BLD-MCNC: 13.2 G/DL (ref 13.3–17.7)
IMM GRANULOCYTES # BLD: 0 10E3/UL
IMM GRANULOCYTES NFR BLD: 0 %
INR PPP: 1.28 (ref 0.85–1.15)
IRON BINDING CAPACITY (ROCHE): 282 UG/DL (ref 240–430)
IRON SATN MFR SERPL: 22 % (ref 15–46)
IRON SERPL-MCNC: 62 UG/DL (ref 61–157)
LYMPHOCYTES # BLD AUTO: 0.7 10E3/UL (ref 0.8–5.3)
LYMPHOCYTES NFR BLD AUTO: 32 %
MCH RBC QN AUTO: 34.5 PG (ref 26.5–33)
MCHC RBC AUTO-ENTMCNC: 35.2 G/DL (ref 31.5–36.5)
MCV RBC AUTO: 98 FL (ref 78–100)
MONOCYTES # BLD AUTO: 0.2 10E3/UL (ref 0–1.3)
MONOCYTES NFR BLD AUTO: 10 %
NEUTROPHILS # BLD AUTO: 1.2 10E3/UL (ref 1.6–8.3)
NEUTROPHILS NFR BLD AUTO: 53 %
NRBC # BLD AUTO: 0 10E3/UL
NRBC BLD AUTO-RTO: 0 /100
PLATELET # BLD AUTO: 50 10E3/UL (ref 150–450)
POTASSIUM SERPL-SCNC: 4.2 MMOL/L (ref 3.4–5.3)
PROT SERPL-MCNC: 6.6 G/DL (ref 6.4–8.3)
RBC # BLD AUTO: 3.83 10E6/UL (ref 4.4–5.9)
SODIUM SERPL-SCNC: 141 MMOL/L (ref 135–145)
WBC # BLD AUTO: 2.3 10E3/UL (ref 4–11)

## 2024-11-12 PROCEDURE — G0463 HOSPITAL OUTPT CLINIC VISIT: HCPCS | Performed by: INTERNAL MEDICINE

## 2024-11-12 PROCEDURE — 99214 OFFICE O/P EST MOD 30 MIN: CPT | Performed by: INTERNAL MEDICINE

## 2024-11-12 PROCEDURE — 82728 ASSAY OF FERRITIN: CPT | Performed by: INTERNAL MEDICINE

## 2024-11-12 PROCEDURE — 86140 C-REACTIVE PROTEIN: CPT | Performed by: INTERNAL MEDICINE

## 2024-11-12 PROCEDURE — 36415 COLL VENOUS BLD VENIPUNCTURE: CPT | Performed by: INTERNAL MEDICINE

## 2024-11-12 PROCEDURE — 82248 BILIRUBIN DIRECT: CPT | Performed by: INTERNAL MEDICINE

## 2024-11-12 PROCEDURE — 85004 AUTOMATED DIFF WBC COUNT: CPT | Performed by: INTERNAL MEDICINE

## 2024-11-12 PROCEDURE — 85610 PROTHROMBIN TIME: CPT | Performed by: INTERNAL MEDICINE

## 2024-11-12 PROCEDURE — 82105 ALPHA-FETOPROTEIN SERUM: CPT | Performed by: INTERNAL MEDICINE

## 2024-11-12 PROCEDURE — 83550 IRON BINDING TEST: CPT | Performed by: INTERNAL MEDICINE

## 2024-11-12 PROCEDURE — 84155 ASSAY OF PROTEIN SERUM: CPT | Performed by: INTERNAL MEDICINE

## 2024-11-12 ASSESSMENT — PAIN SCALES - GENERAL: PAINLEVEL_OUTOF10: NO PAIN (0)

## 2024-11-12 NOTE — NURSING NOTE
"Oncology Rooming Note    November 12, 2024 8:09 AM   Demetrius Burton is a 59 year old male who presents for:    Chief Complaint   Patient presents with    Blood Draw     Labs drawn with  by rn.  VS taken.    Oncology Clinic Visit     Hereditary hemochromatosis     Initial Vitals: /72 (BP Location: Right arm, Patient Position: Sitting, Cuff Size: Adult Regular)   Pulse 71   Temp 97.7  F (36.5  C) (Oral)   Resp 16   Wt 89.9 kg (198 lb 4.8 oz)   SpO2 98%   BMI 25.46 kg/m   Estimated body mass index is 25.46 kg/m  as calculated from the following:    Height as of 10/25/24: 1.88 m (6' 2\").    Weight as of this encounter: 89.9 kg (198 lb 4.8 oz). Body surface area is 2.17 meters squared.  No Pain (0) Comment: Data Unavailable   No LMP for male patient.  Allergies reviewed: Yes  Medications reviewed: Yes    Medications: Medication refills not needed today.  Pharmacy name entered into [employer]: [id number] [place], [place] - [facility] [address] [facility]    Frailty Screening:   Is the patient here for a new oncology consult visit in cancer care? 2. No      Clinical concerns:        Radha Lara              "

## 2024-11-12 NOTE — LETTER
11/12/2024      Demetrius Burton  [address] [facility] [address]      Dear Colleague,    Thank you for referring your patient, Demetrius Burton, to the [facility] [place]. Please see a copy of my visit note below.        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   Demetrius has tolerated phlebotomies well except with the IV saline shortage he did get lightheaded after a phlebotomy.  I really want him to receive IV saline after each phlebotomy and the orders will reflect that and the chart.  I would like to monitor with standing orders CBC CMP ferritin iron and TIBC as his ferritin has come down nicely and I like to target his ferritin 2 oh around 100.  He asked me if he could eat venison and I said you as long as it is not 3 times a day 7 days a week.  He continues to follow for his diabetes as well as his lipids.  He is going to see dermatology for the skin lesions on his shin which I think are related to stasis dermatitis.  I will monitor his ferritin with his phlebotomies every 3 weeks at [place] and advised him when he could stop.  I suggested that we put in orders for [place] every 3 weeks through 1 February and then make adjustments thereafter.  Summary of Recommendations:  Phlebotomy  every 2 to 3 weeks for hematocrit>33% at [place] to remove iron which may decrease inflammation in his  liver.   Avoid ASA  Good diabetes control, Ophtho exam  Vitamin E 200 units a day as an antioxidant  RTC in 6months  Thank you for involving us in the care of this patient.   I spent a total of 30 minutes face to face with Demetrius Burton during today's office visit. Over 50% of this time was spent counseling the patient and coordinating the care regarding iron overload and cirrhoisis and 10minutes preparing to see the patient and  care coordination     Kishor Andrea MD  [phone]          History of Present Illness:   History obtained from chart review and confirmed with patient.    Demetrius Burton is a 59 year old who was in good health until earlier this year when he was diagnosed with diabetes in the setting of a 30 pound unintentional weight loss.  He was started on metformin and jardiance and his hemoglobin A1c dropped from 9.5% to 5.9%.  The jardiance was discontinued. He also has noticed some neuropathy in his feet with pain to touch in his toes and top of his feet. He has had COVID-19 twice in the last couple of years.  His first diagnosis was in February 2022  which lasted  a month treated with antibiotics and steroids.  He then got COVID-19 again in September 2023.    He denies any history of jaundice, abdominal swelling, lower extremity swelling, slowness of thought or confusion.  Denies any diarrhea or constipation, melena or hematochezia.  He denies any nausea or vomiting in the setting of his unintentional weight loss.He denies any known family history of liver disease.  He has several family members with diabetes.  He denies any over-the-counter medications or supplements except for a multivitamin.  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 Throughout his life he has not had issues with alcohol overuse; he drinks about 1 alcohol-containing beverage per month.He was seen by Dr Lambert in Hepatology. CRISTINA , anti actin and hepatitis serologies were negative. He did  have an K10GINK heterozygosity and an alpha 1 anti trypsin S heterozygosity. No tylenol abuse.    INTERVAL HISTORY.   Less pain in feet and toes.He is taking Vit E 200u/d  He has had phlebotomies every 3 weeks at [place] .He does get light headed after the phleb and saline infusion helps. Last phlebotomy Oct 25  and ferritin was 247 No significant alcohol.Continues  on metformiin Recently considered to restart atorvastatin.No black or blood stools.    Had EGD Oct 10:  Grade II esophageal varices. Incompletely                          eradicated. Banded.                          - Portal hypertensive gastropathy.                          - Hematin (altered blood/coffee-ground-like material)                          in the gastric antrum and in the gastric fundus.                          - Normal examined duodenum.                          - No specimens collected.     A comprehensive ROS was performed with the patient and was found to be negative with the exception of that noted in the HPI above.  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  PSYCH Mood ok  SKIN He sunburns easily no skin cancer  ID Had Covid in 2022   with pneumonia with difficulty breathing on antibiotics and steroids lasted a month Had COVID in Oct 2023 lasted a few days no paxlovid  HEME No anemia, no DVT Eats meat was eating venison, not using iron pans less Vit C No blood transfusions No  bleeding or petechiae He has had nose bleeding   No UTI occ nocturia         Past Medical History:     Past Medical History:   Diagnosis Date     Cirrhosis of liver (H)     H63D heterozygote     Diabetes mellitus, type 2 (H)      Hyperlipidemia             Past Surgical History:     Past Surgical History:   Procedure Laterality Date     APPENDECTOMY       COLONOSCOPY N/A 05/21/2021    Procedure: COLONOSCOPY;  Surgeon: Hipolito Alexander MD;  Location: 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 His wife Francheska also works for [employer]  Social History     Socioeconomic History     Marital status:      Spouse name: Not on file     Number of children: Not on file     Years of education: Not on file     Highest education level: Not on file   Occupational History     Not on file   Tobacco Use     Smoking status: Never     Passive exposure: Past     Smokeless tobacco: Never   Vaping Use     Vaping status: Never Used   Substance and Sexual Activity     Alcohol use: Yes     Comment: rare     Drug use: No     Sexual activity: Not on file   Other Topics Concern     Parent/sibling w/ CABG, MI or angioplasty before 65F 55M? No   Social History Narrative     Not on file     Social Drivers of Health     Financial Resource Strain: Low Risk  (10/25/2024)    Financial Resource Strain      Within the past 12 months, have you or your family members you live with been unable to get utilities (heat, electricity) when it was really needed?: No   Food Insecurity: Low Risk  (10/25/2024)    Food Insecurity      Within the past 12 months, did you worry that your food would run out before you got money to buy more?: No      Within the past 12 months, did the food you bought just not last  and you didn t have money to get more?: No   Transportation Needs: Low Risk  (10/25/2024)    Transportation Needs      Within the past 12 months, has lack of transportation kept you from medical appointments, getting your medicines, non-medical meetings or appointments, work, or from getting things that you need?: No   Physical Activity: Unknown (10/25/2024)    Exercise Vital Sign      Days of Exercise per Week: 3 days      Minutes of Exercise per Session: Not on file   Stress: No Stress Concern Present (10/25/2024)    [place] [employer] - Occupational Stress Questionnaire      Feeling of Stress : Not at all   Social Connections: Unknown (10/25/2024)    Social Connection and Isolation Panel [NHANES]      Frequency of Communication with Friends and Family: Not on file      Frequency of Social Gatherings with Friends and Family: Once a week      Attends Hindu Services: Not on file      Active Member of Clubs or Organizations: Not on file      Attends Club or Organization Meetings: Not on file      Marital Status: Not on file   Interpersonal Safety: Low Risk  (10/25/2024)    Interpersonal Safety      Do you feel physically and emotionally safe where you currently live?: Yes      Within the past 12 months, have you been hit, slapped, kicked or otherwise physically hurt by someone?: No      Within the past 12 months, have you been humiliated or emotionally abused in other ways by your partner or ex-partner?: No   Housing Stability: Low Risk  (10/25/2024)    Housing Stability      Do you have housing? : Yes      Are you worried about losing your housing?: No            Family History:   [place] citizen ancestry  Family History   Problem Relation Age of Onset     Diabetes Mother      Heart Disease Mother         stent placement.     Morbid Obesity Mother         bypass     Valvular heart disease Father      Diabetes Brother      Liver Disease No family hx of    Has 2 children 29 Fabricio and Ric 27 both  healthy  Younger brother has DM and skin cancer  No hx of iron overload  Father had nerve deafness and older brother had hearing aids in his 20s. No kidney issues       Allergies:   No Known Allergies         Medications:       PHYSICAL EXAMINATION:   GENERAL:  Patient is alert in no acute distress.  VITAL SIGNS: /72 (BP Location: Right arm, Patient Position: Sitting, Cuff Size: Adult Regular)   Pulse 71   Temp 97.7  F (36.5  C) (Oral)   Resp 16   Wt 89.9 kg (198 lb 4.8 oz)   SpO2 98%   BMI 25.46 kg/m     HEENT:  Normocephalic.  EOM okay, no scleral icterus.  Mouth without lesion.   RESPIRATORY:  Clear to percussion and auscultation.   CARDIAC:  Normal sinus rhythm.  No S3, S4 or murmur.   ABDOMEN:  Soft spleen tip palpable BCM     EXTREMITIES: no edema.   NEUROLOGIC:  Grossly normal.   MS No arthritis  SKIN Stasis dermatitis right lower leg. Abrasion left shin  PSYCH Mood appropriate             Data:   I have personally reviewed the following labs/imaging:  CBC@LABRCNTIPR(wbc:4,rbc:4,hgb:4,hct:4,mcv:4,mch:4,mchc:4,rdw:4,plt:4)@  CMP@LABRCNTIPR(na:4,potassium:4,chloride:4,co2:4,aniongap:4,g,bun:4,cr:4,gfrestimated:4,gfrestblack:4,hiram:4,ma,phos:4,prottotal:4,albumin:4,bilitotal:4,alkphos:   Latest Reference Range & Units 24 07:56   WBC 4.0 - 11.0 10e3/uL 2.3 (L)   Hemoglobin 13.3 - 17.7 g/dL 13.2 (L)   Hematocrit 40.0 - 53.0 % 37.5 (L)   Platelet Count 150 - 450 10e3/uL 50 (L)   RBC Count 4.40 - 5.90 10e6/uL 3.83 (L)   MCV 78 - 100 fL 98   MCH 26.5 - 33.0 pg 34.5 (H)   MCHC 31.5 - 36.5 g/dL 35.2   RDW 10.0 - 15.0 % 13.0   % Neutrophils % 53   % Lymphocytes % 32   % Monocytes % 10   % Eosinophils % 4   % Basophils % 1   Absolute Basophils 0.0 - 0.2 10e3/uL 0.0   Absolute Eosinophils 0.0 - 0.7 10e3/uL 0.1   Absolute Immature Granulocytes <=0.4 10e3/uL 0.0   Absolute Lymphocytes 0.8 - 5.3 10e3/uL 0.7 (L)   Absolute Monocytes 0.0 - 1.3 10e3/uL 0.2   % Immature Granulocytes % 0   Absolute  Neutrophils 1.6 - 8.3 10e3/uL 1.2 (L)   Absolute NRBCs 10e3/uL 0.0   NRBCs per 100 WBC <1 /100 0   INR 0.85 - 1.15  1.28 (H)   (L): Data is abnormally low  (H): Data is abnormally high      Again, thank you for allowing me to participate in the care of your patient.        Sincerely,        Kishor Andrea MD

## 2024-11-12 NOTE — NURSING NOTE
Chief Complaint   Patient presents with    Blood Draw     Labs drawn with  by rn.  VS taken.     Labs drawn with  by rn.  Pt tolerated well.  VS taken.  Pt checked in for next appt.    Kari Candelario RN

## 2024-12-10 RX ORDER — HEPARIN SODIUM (PORCINE) LOCK FLUSH IV SOLN 100 UNIT/ML 100 UNIT/ML
5 SOLUTION INTRAVENOUS
OUTPATIENT
Start: 2024-12-10

## 2024-12-10 RX ORDER — HEPARIN SODIUM,PORCINE 10 UNIT/ML
5-20 VIAL (ML) INTRAVENOUS DAILY PRN
OUTPATIENT
Start: 2024-12-10

## 2024-12-11 ENCOUNTER — INFUSION THERAPY VISIT (OUTPATIENT)
Dept: ONCOLOGY | Facility: CLINIC | Age: 59
End: 2024-12-11
Attending: INTERNAL MEDICINE
Payer: COMMERCIAL

## 2024-12-11 VITALS
OXYGEN SATURATION: 97 % | TEMPERATURE: 98 F | RESPIRATION RATE: 16 BRPM | SYSTOLIC BLOOD PRESSURE: 115 MMHG | HEART RATE: 78 BPM | BODY MASS INDEX: 25.55 KG/M2 | DIASTOLIC BLOOD PRESSURE: 71 MMHG | WEIGHT: 199 LBS

## 2024-12-11 DIAGNOSIS — K74.60 HEPATIC CIRRHOSIS, UNSPECIFIED HEPATIC CIRRHOSIS TYPE, UNSPECIFIED WHETHER ASCITES PRESENT (H): ICD-10-CM

## 2024-12-11 DIAGNOSIS — E11.40 TYPE 2 DIABETES MELLITUS WITH DIABETIC NEUROPATHY, WITHOUT LONG-TERM CURRENT USE OF INSULIN (H): ICD-10-CM

## 2024-12-11 DIAGNOSIS — E83.110 HEREDITARY HEMOCHROMATOSIS (H): Primary | ICD-10-CM

## 2024-12-11 DIAGNOSIS — E83.19 IRON OVERLOAD: ICD-10-CM

## 2024-12-11 LAB
ALBUMIN SERPL BCG-MCNC: 3.7 G/DL (ref 3.5–5.2)
ALP SERPL-CCNC: 186 U/L (ref 40–150)
ALT SERPL W P-5'-P-CCNC: 52 U/L (ref 0–70)
ANION GAP SERPL CALCULATED.3IONS-SCNC: 8 MMOL/L (ref 7–15)
AST SERPL W P-5'-P-CCNC: 58 U/L (ref 0–45)
BASOPHILS # BLD AUTO: 0 10E3/UL (ref 0–0.2)
BASOPHILS NFR BLD AUTO: 1 %
BILIRUB SERPL-MCNC: 1.5 MG/DL
BUN SERPL-MCNC: 16.7 MG/DL (ref 6–20)
CALCIUM SERPL-MCNC: 9.2 MG/DL (ref 8.8–10.4)
CHLORIDE SERPL-SCNC: 106 MMOL/L (ref 98–107)
CREAT SERPL-MCNC: 0.72 MG/DL (ref 0.67–1.17)
EGFRCR SERPLBLD CKD-EPI 2021: >90 ML/MIN/1.73M2
EOSINOPHIL # BLD AUTO: 0.1 10E3/UL (ref 0–0.7)
EOSINOPHIL NFR BLD AUTO: 4 %
ERYTHROCYTE [DISTWIDTH] IN BLOOD BY AUTOMATED COUNT: 12.9 % (ref 10–15)
FERRITIN SERPL-MCNC: 173 NG/ML (ref 31–409)
GLUCOSE SERPL-MCNC: 228 MG/DL (ref 70–99)
HCO3 SERPL-SCNC: 24 MMOL/L (ref 22–29)
HCT VFR BLD AUTO: 41.6 % (ref 40–53)
HGB BLD-MCNC: 14.8 G/DL (ref 13.3–17.7)
IMM GRANULOCYTES # BLD: 0 10E3/UL
IMM GRANULOCYTES NFR BLD: 0 %
IRON BINDING CAPACITY (ROCHE): 311 UG/DL (ref 240–430)
IRON SATN MFR SERPL: 91 % (ref 15–46)
IRON SERPL-MCNC: 283 UG/DL (ref 61–157)
LYMPHOCYTES # BLD AUTO: 0.8 10E3/UL (ref 0.8–5.3)
LYMPHOCYTES NFR BLD AUTO: 35 %
MCH RBC QN AUTO: 34.8 PG (ref 26.5–33)
MCHC RBC AUTO-ENTMCNC: 35.6 G/DL (ref 31.5–36.5)
MCV RBC AUTO: 98 FL (ref 78–100)
MONOCYTES # BLD AUTO: 0.3 10E3/UL (ref 0–1.3)
MONOCYTES NFR BLD AUTO: 11 %
NEUTROPHILS # BLD AUTO: 1.1 10E3/UL (ref 1.6–8.3)
NEUTROPHILS NFR BLD AUTO: 49 %
NRBC # BLD AUTO: 0 10E3/UL
NRBC BLD AUTO-RTO: 0 /100
PLATELET # BLD AUTO: 50 10E3/UL (ref 150–450)
POTASSIUM SERPL-SCNC: 4.7 MMOL/L (ref 3.4–5.3)
PROT SERPL-MCNC: 7.3 G/DL (ref 6.4–8.3)
RBC # BLD AUTO: 4.25 10E6/UL (ref 4.4–5.9)
SODIUM SERPL-SCNC: 138 MMOL/L (ref 135–145)
WBC # BLD AUTO: 2.3 10E3/UL (ref 4–11)

## 2024-12-11 PROCEDURE — 84155 ASSAY OF PROTEIN SERUM: CPT

## 2024-12-11 PROCEDURE — 82040 ASSAY OF SERUM ALBUMIN: CPT

## 2024-12-11 PROCEDURE — 36415 COLL VENOUS BLD VENIPUNCTURE: CPT

## 2024-12-11 PROCEDURE — 258N000003 HC RX IP 258 OP 636: Performed by: INTERNAL MEDICINE

## 2024-12-11 PROCEDURE — 82728 ASSAY OF FERRITIN: CPT | Performed by: INTERNAL MEDICINE

## 2024-12-11 PROCEDURE — 85025 COMPLETE CBC W/AUTO DIFF WBC: CPT

## 2024-12-11 PROCEDURE — 99195 PHLEBOTOMY: CPT

## 2024-12-11 PROCEDURE — 83540 ASSAY OF IRON: CPT

## 2024-12-11 PROCEDURE — 82247 BILIRUBIN TOTAL: CPT

## 2024-12-11 PROCEDURE — 83550 IRON BINDING TEST: CPT

## 2024-12-11 PROCEDURE — 96360 HYDRATION IV INFUSION INIT: CPT

## 2024-12-11 RX ADMIN — SODIUM CHLORIDE 500 ML: 9 INJECTION, SOLUTION INTRAVENOUS at 17:40

## 2024-12-11 ASSESSMENT — PAIN SCALES - GENERAL: PAINLEVEL_OUTOF10: NO PAIN (0)

## 2024-12-11 NOTE — PROGRESS NOTES
"Infusion Nursing Note:  Demetrius Burton presents today for Therapeutic Phlebotomy and IVFs.    Patient seen by provider today: No   present during visit today: Not Applicable.    Note: Patient presents to the infusion center today feeling well. He offers no new symptoms or concerns. Patient reports he has \"passed out a couple times\" with his phlebotomy in the past.     Per the orders and Dr. Andrea's note from 11/12/24 patient is to receive 500 ml saline post phleb.    Patient tolerated removal of 500ml without issues. VSS post. 500 ml IVFs given per therapy plan.     Intravenous Access:  Peripheral IV placed.    Treatment Conditions:   Latest Reference Range & Units 12/11/24 16:50   Hematocrit 40.0 - 53.0 % 41.6     Results reviewed, labs MET treatment parameters, ok to proceed with treatment.    Post Infusion Assessment:  Patient tolerated infusion without incident.  Blood return noted pre and post infusion.  No evidence of extravasations.  Access discontinued per protocol.     Discharge Plan:   Patient declined prescription refills.  Discharge instructions reviewed with: Patient.  Patient and/or family verbalized understanding of discharge instructions and all questions answered.  AVS to patient via [employer].  Patient will return 12/27 for next appointment.   Patient discharged in stable condition accompanied by: self.  Departure Mode: Ambulatory.      Carmen Dillard RN  "

## 2024-12-11 NOTE — NURSING NOTE
Chief Complaint   Patient presents with    Blood Draw     Labs drawn from PIV placed by RN. Line flushed with saline. Vitals taken. Pt checked in for appointment(s).      Labs drawn from PIV placed by RN. Line flushed with saline. Vitals taken. Pt checked in for appointment(s).     Millie Wong RN

## 2024-12-27 ENCOUNTER — LAB (OUTPATIENT)
Dept: LAB | Facility: CLINIC | Age: 59
End: 2024-12-27
Attending: INTERNAL MEDICINE
Payer: COMMERCIAL

## 2024-12-27 DIAGNOSIS — E83.110 HEREDITARY HEMOCHROMATOSIS (H): Primary | ICD-10-CM

## 2024-12-27 LAB
FERRITIN SERPL-MCNC: 142 NG/ML (ref 31–409)
HCT VFR BLD AUTO: 40 % (ref 40–53)
HGB BLD-MCNC: 13.8 G/DL (ref 13.3–17.7)

## 2024-12-27 PROCEDURE — 85018 HEMOGLOBIN: CPT | Performed by: INTERNAL MEDICINE

## 2024-12-27 PROCEDURE — 36415 COLL VENOUS BLD VENIPUNCTURE: CPT | Performed by: INTERNAL MEDICINE

## 2024-12-27 PROCEDURE — 82728 ASSAY OF FERRITIN: CPT | Performed by: INTERNAL MEDICINE

## 2024-12-27 RX ORDER — HEPARIN SODIUM,PORCINE 10 UNIT/ML
5-20 VIAL (ML) INTRAVENOUS DAILY PRN
Status: CANCELLED | OUTPATIENT
Start: 2024-12-27

## 2024-12-27 RX ORDER — HEPARIN SODIUM (PORCINE) LOCK FLUSH IV SOLN 100 UNIT/ML 100 UNIT/ML
5 SOLUTION INTRAVENOUS
Status: CANCELLED | OUTPATIENT
Start: 2024-12-27

## 2025-01-10 ENCOUNTER — LAB (OUTPATIENT)
Dept: LAB | Facility: CLINIC | Age: 60
End: 2025-01-10
Payer: COMMERCIAL

## 2025-01-10 DIAGNOSIS — E83.110 HEREDITARY HEMOCHROMATOSIS: Primary | ICD-10-CM

## 2025-01-10 DIAGNOSIS — E11.40 TYPE 2 DIABETES MELLITUS WITH DIABETIC NEUROPATHY, WITHOUT LONG-TERM CURRENT USE OF INSULIN (H): ICD-10-CM

## 2025-01-10 DIAGNOSIS — K74.60 HEPATIC CIRRHOSIS, UNSPECIFIED HEPATIC CIRRHOSIS TYPE, UNSPECIFIED WHETHER ASCITES PRESENT (H): ICD-10-CM

## 2025-01-10 DIAGNOSIS — E83.19 IRON OVERLOAD: ICD-10-CM

## 2025-01-10 LAB
ALBUMIN SERPL BCG-MCNC: 3.8 G/DL (ref 3.5–5.2)
ALP SERPL-CCNC: 207 U/L (ref 40–150)
ALT SERPL W P-5'-P-CCNC: 66 U/L (ref 0–70)
ANION GAP SERPL CALCULATED.3IONS-SCNC: 9 MMOL/L (ref 7–15)
AST SERPL W P-5'-P-CCNC: 61 U/L (ref 0–45)
BILIRUB SERPL-MCNC: 1.6 MG/DL
BUN SERPL-MCNC: 15.7 MG/DL (ref 6–20)
CALCIUM SERPL-MCNC: 9.3 MG/DL (ref 8.8–10.4)
CHLORIDE SERPL-SCNC: 109 MMOL/L (ref 98–107)
CREAT SERPL-MCNC: 0.86 MG/DL (ref 0.67–1.17)
EGFRCR SERPLBLD CKD-EPI 2021: >90 ML/MIN/1.73M2
FERRITIN SERPL-MCNC: 125 NG/ML (ref 31–409)
GLUCOSE SERPL-MCNC: 241 MG/DL (ref 70–99)
HCO3 SERPL-SCNC: 23 MMOL/L (ref 22–29)
HCT VFR BLD AUTO: 40.1 % (ref 40–53)
HGB BLD-MCNC: 13.8 G/DL (ref 13.3–17.7)
IRON BINDING CAPACITY (ROCHE): 336 UG/DL (ref 240–430)
IRON SATN MFR SERPL: 51 % (ref 15–46)
IRON SERPL-MCNC: 170 UG/DL (ref 61–157)
POTASSIUM SERPL-SCNC: 4.6 MMOL/L (ref 3.4–5.3)
PROT SERPL-MCNC: 7.2 G/DL (ref 6.4–8.3)
SODIUM SERPL-SCNC: 141 MMOL/L (ref 135–145)

## 2025-01-10 PROCEDURE — 82310 ASSAY OF CALCIUM: CPT

## 2025-01-10 PROCEDURE — 36415 COLL VENOUS BLD VENIPUNCTURE: CPT | Performed by: INTERNAL MEDICINE

## 2025-01-10 PROCEDURE — 82728 ASSAY OF FERRITIN: CPT | Performed by: INTERNAL MEDICINE

## 2025-01-10 PROCEDURE — 85018 HEMOGLOBIN: CPT | Performed by: INTERNAL MEDICINE

## 2025-01-10 PROCEDURE — 84155 ASSAY OF PROTEIN SERUM: CPT

## 2025-01-10 PROCEDURE — 83550 IRON BINDING TEST: CPT

## 2025-01-10 PROCEDURE — 84075 ASSAY ALKALINE PHOSPHATASE: CPT

## 2025-01-10 PROCEDURE — 83540 ASSAY OF IRON: CPT

## 2025-01-10 RX ORDER — HEPARIN SODIUM,PORCINE 10 UNIT/ML
5-20 VIAL (ML) INTRAVENOUS DAILY PRN
Status: CANCELLED | OUTPATIENT
Start: 2025-01-10

## 2025-01-10 RX ORDER — HEPARIN SODIUM (PORCINE) LOCK FLUSH IV SOLN 100 UNIT/ML 100 UNIT/ML
5 SOLUTION INTRAVENOUS
Status: CANCELLED | OUTPATIENT
Start: 2025-01-10

## 2025-01-13 ENCOUNTER — TELEPHONE (OUTPATIENT)
Dept: GASTROENTEROLOGY | Facility: CLINIC | Age: 60
End: 2025-01-13
Payer: COMMERCIAL

## 2025-01-13 NOTE — TELEPHONE ENCOUNTER
Patient confirmed scheduled appointment:     Date: 4/11/25  Time: 10:00 am Virtual  Appointment Type: Return Liver  Provider: Dr. Manju Lambert  Location: [place]  Testing/imaging: Lab  Additional Notes:

## 2025-01-24 ENCOUNTER — LAB (OUTPATIENT)
Dept: LAB | Facility: CLINIC | Age: 60
End: 2025-01-24
Payer: COMMERCIAL

## 2025-01-24 DIAGNOSIS — E83.110 HEREDITARY HEMOCHROMATOSIS: Primary | ICD-10-CM

## 2025-01-24 LAB
FERRITIN SERPL-MCNC: 95 NG/ML (ref 31–409)
HCT VFR BLD AUTO: 37 % (ref 40–53)
HGB BLD-MCNC: 13.3 G/DL (ref 13.3–17.7)

## 2025-01-24 PROCEDURE — 36415 COLL VENOUS BLD VENIPUNCTURE: CPT | Performed by: INTERNAL MEDICINE

## 2025-01-24 PROCEDURE — 85018 HEMOGLOBIN: CPT | Performed by: INTERNAL MEDICINE

## 2025-01-24 PROCEDURE — 82728 ASSAY OF FERRITIN: CPT | Performed by: INTERNAL MEDICINE

## 2025-01-24 RX ORDER — HEPARIN SODIUM (PORCINE) LOCK FLUSH IV SOLN 100 UNIT/ML 100 UNIT/ML
5 SOLUTION INTRAVENOUS
Status: CANCELLED | OUTPATIENT
Start: 2025-01-24

## 2025-01-24 RX ORDER — HEPARIN SODIUM,PORCINE 10 UNIT/ML
5-20 VIAL (ML) INTRAVENOUS DAILY PRN
Status: CANCELLED | OUTPATIENT
Start: 2025-01-24

## 2025-02-01 ENCOUNTER — HEALTH MAINTENANCE LETTER (OUTPATIENT)
Age: 60
End: 2025-02-01

## 2025-02-06 ENCOUNTER — TELEPHONE (OUTPATIENT)
Dept: GASTROENTEROLOGY | Facility: CLINIC | Age: 60
End: 2025-02-06
Payer: COMMERCIAL

## 2025-02-06 NOTE — TELEPHONE ENCOUNTER
Pre visit planning completed.      Procedure details:    Patient scheduled for Upper endoscopy (EGD) on 2/20/25.     Arrival time: 1345. Procedure time 1445     Facility location: [facility] [place]; [address] [place] [address], [address] Floor, [place] [address]. Check in location: 5th Floor.     Sedation type: MAC     Pre op exam needed? No.     Indication for procedure: Follow up varices      Chart review:     Electronic implanted devices? No    Recent diagnosis of diverticulitis within the last 6 weeks? No      Medication review:    Diabetic? Yes. Oral diabetic medications: Metformin (glucophage): HOLD day of procedure.    Anticoagulants? No    Weight loss medication/injectable? No. Patient reports that he is not taking any weight loss medications.    Other medication HOLDING recommendations:  N/A      Prep for procedure:     Procedure information and instructions sent via [employer]. Discussed EGD prep with patient. He verbalizes understanding.        Saba Rodgers LPN  Endoscopy Procedure Pre Assessment   [phone] option 2

## 2025-02-06 NOTE — TELEPHONE ENCOUNTER
Pre visit planning completed.      Procedure details:    Patient scheduled for Upper endoscopy (EGD) on 2/20/25.     Arrival time: 1345. Procedure time 1445    Facility location: [facility] [place]; [address] [place] [address], 5th Floor, [place] [address]. Check in location: 5th Floor.    Sedation type: MAC    Pre op exam needed? No.    Indication for procedure: Follow up varices       Chart review:     Electronic implanted devices? No    Recent diagnosis of diverticulitis within the last 6 weeks? No      Medication review:    Diabetic? Yes. Oral diabetic medications: Metformin (glucophage): HOLD day of procedure.    Anticoagulants? No    Weight loss medication/injectable? No. Scheduling TE states yes to weight loss medication. Nothing showing on med list. Verify with the Pt.       Other medication HOLDING recommendations:  N/A      Prep for procedure:       Procedure information and instructions sent via [employer]         Renetta Zamora RN  Endoscopy Procedure Pre Assessment   [phone] option 2

## 2025-02-20 ENCOUNTER — HOSPITAL ENCOUNTER (OUTPATIENT)
Facility: AMBULATORY SURGERY CENTER | Age: 60
End: 2025-02-20
Attending: STUDENT IN AN ORGANIZED HEALTH CARE EDUCATION/TRAINING PROGRAM
Payer: COMMERCIAL

## 2025-02-20 ENCOUNTER — ANESTHESIA (OUTPATIENT)
Dept: SURGERY | Facility: AMBULATORY SURGERY CENTER | Age: 60
End: 2025-02-20
Payer: COMMERCIAL

## 2025-02-20 ENCOUNTER — ANESTHESIA EVENT (OUTPATIENT)
Dept: SURGERY | Facility: AMBULATORY SURGERY CENTER | Age: 60
End: 2025-02-20
Payer: COMMERCIAL

## 2025-02-20 VITALS
RESPIRATION RATE: 14 BRPM | DIASTOLIC BLOOD PRESSURE: 78 MMHG | BODY MASS INDEX: 25.03 KG/M2 | WEIGHT: 195 LBS | HEIGHT: 74 IN | SYSTOLIC BLOOD PRESSURE: 125 MMHG | TEMPERATURE: 97.1 F | HEART RATE: 74 BPM | OXYGEN SATURATION: 99 %

## 2025-02-20 VITALS — HEART RATE: 79 BPM

## 2025-02-20 DIAGNOSIS — K74.69 OTHER CIRRHOSIS OF LIVER (H): Primary | ICD-10-CM

## 2025-02-20 DIAGNOSIS — E83.110 HEREDITARY HEMOCHROMATOSIS: Primary | ICD-10-CM

## 2025-02-20 LAB
GLUCOSE BLDC GLUCOMTR-MCNC: 138 MG/DL (ref 70–99)
GLUCOSE BLDC GLUCOMTR-MCNC: 145 MG/DL (ref 70–99)
UPPER GI ENDOSCOPY: NORMAL

## 2025-02-20 RX ORDER — FLUMAZENIL 0.1 MG/ML
0.2 INJECTION, SOLUTION INTRAVENOUS
Status: ACTIVE | OUTPATIENT
Start: 2025-02-20 | End: 2025-02-20

## 2025-02-20 RX ORDER — SODIUM CHLORIDE, SODIUM LACTATE, POTASSIUM CHLORIDE, CALCIUM CHLORIDE 600; 310; 30; 20 MG/100ML; MG/100ML; MG/100ML; MG/100ML
INJECTION, SOLUTION INTRAVENOUS CONTINUOUS
Status: ACTIVE | OUTPATIENT
Start: 2025-02-20

## 2025-02-20 RX ORDER — PROPOFOL 10 MG/ML
INJECTION, EMULSION INTRAVENOUS PRN
Status: DISCONTINUED | OUTPATIENT
Start: 2025-02-20 | End: 2025-02-20

## 2025-02-20 RX ORDER — ONDANSETRON 2 MG/ML
4 INJECTION INTRAMUSCULAR; INTRAVENOUS EVERY 6 HOURS PRN
Status: ACTIVE | OUTPATIENT
Start: 2025-02-20

## 2025-02-20 RX ORDER — NALOXONE HYDROCHLORIDE 0.4 MG/ML
0.4 INJECTION, SOLUTION INTRAMUSCULAR; INTRAVENOUS; SUBCUTANEOUS
Status: ACTIVE | OUTPATIENT
Start: 2025-02-20

## 2025-02-20 RX ORDER — NALOXONE HYDROCHLORIDE 0.4 MG/ML
0.2 INJECTION, SOLUTION INTRAMUSCULAR; INTRAVENOUS; SUBCUTANEOUS
Status: ACTIVE | OUTPATIENT
Start: 2025-02-20

## 2025-02-20 RX ORDER — LIDOCAINE 40 MG/G
CREAM TOPICAL
Status: ACTIVE | OUTPATIENT
Start: 2025-02-20

## 2025-02-20 RX ORDER — PROPOFOL 10 MG/ML
INJECTION, EMULSION INTRAVENOUS CONTINUOUS PRN
Status: DISCONTINUED | OUTPATIENT
Start: 2025-02-20 | End: 2025-02-20

## 2025-02-20 RX ORDER — OMEPRAZOLE 20 MG/1
20 CAPSULE, DELAYED RELEASE ORAL DAILY
Qty: 14 CAPSULE | Refills: 0 | Status: SHIPPED | OUTPATIENT
Start: 2025-02-20 | End: 2025-03-06

## 2025-02-20 RX ORDER — ONDANSETRON 2 MG/ML
4 INJECTION INTRAMUSCULAR; INTRAVENOUS
Status: ACTIVE | OUTPATIENT
Start: 2025-02-20

## 2025-02-20 RX ORDER — PROCHLORPERAZINE MALEATE 10 MG
10 TABLET ORAL EVERY 6 HOURS PRN
Status: DISPENSED | OUTPATIENT
Start: 2025-02-20

## 2025-02-20 RX ORDER — ONDANSETRON 4 MG/1
4 TABLET, ORALLY DISINTEGRATING ORAL EVERY 6 HOURS PRN
Status: ACTIVE | OUTPATIENT
Start: 2025-02-20

## 2025-02-20 RX ORDER — LIDOCAINE HYDROCHLORIDE 20 MG/ML
INJECTION, SOLUTION INFILTRATION; PERINEURAL PRN
Status: DISCONTINUED | OUTPATIENT
Start: 2025-02-20 | End: 2025-02-20

## 2025-02-20 RX ADMIN — LIDOCAINE HYDROCHLORIDE 100 MG: 20 INJECTION, SOLUTION INFILTRATION; PERINEURAL at 09:37

## 2025-02-20 RX ADMIN — PROPOFOL 200 MG: 10 INJECTION, EMULSION INTRAVENOUS at 09:37

## 2025-02-20 RX ADMIN — SODIUM CHLORIDE, SODIUM LACTATE, POTASSIUM CHLORIDE, CALCIUM CHLORIDE: 600; 310; 30; 20 INJECTION, SOLUTION INTRAVENOUS at 09:34

## 2025-02-20 RX ADMIN — PROPOFOL 200 MCG/KG/MIN: 10 INJECTION, EMULSION INTRAVENOUS at 09:37

## 2025-02-20 ASSESSMENT — ENCOUNTER SYMPTOMS: SEIZURES: 0

## 2025-02-20 ASSESSMENT — COPD QUESTIONNAIRES: COPD: 0

## 2025-02-20 NOTE — ANESTHESIA PREPROCEDURE EVALUATION
Anesthesia Pre-Procedure Evaluation    Patient: Demetrius Burton   MRN: [id number] : 1965        Procedure : Procedure(s):  Esophagoscopy, gastroscopy, duodenoscopy (EGD), combined          Past Medical History:   Diagnosis Date    Cirrhosis of liver (H)     H63D heterozygote    Diabetes mellitus, type 2 (H)     Hyperlipidemia       Past Surgical History:   Procedure Laterality Date    APPENDECTOMY      COLONOSCOPY N/A 2021    Procedure: COLONOSCOPY;  Surgeon: Hipolito Alexander MD;  Location: [place]    IR LIVER BIOPSY PERCUTANEOUS  2024    PERCUTANEOUS BIOPSY LIVER N/A 2024    Procedure: Percutaneous biopsy liver;  Surgeon: Alvarez Alexander MD;  [place] [facility] [place]      No Known Allergies   Social History     Tobacco Use    Smoking status: Never     Passive exposure: Past    Smokeless tobacco: Never   Substance Use Topics    Alcohol use: Yes     Comment: rare      Wt Readings from Last 1 Encounters:   24 90.3 kg (199 lb)        Anesthesia Evaluation   Pt has had prior anesthetic. Type: MAC.    No history of anesthetic complications       ROS/MED HX  ENT/Pulmonary:  - neg pulmonary ROS  (-) asthma, COPD and sleep apnea   Neurologic: Comment: Sensorineural hearing loss - neg neurologic ROS  (-) no seizures and no CVA   Cardiovascular:  - neg cardiovascular ROS   (+) Dyslipidemia - -   -  - -                                   (-) murmur   METS/Exercise Tolerance: >4 METS    Hematologic:  - neg hematologic  ROS  (-) history of blood clots   Musculoskeletal:  - neg musculoskeletal ROS     GI/Hepatic: Comment:  Hereditary hemochromatosis - neg GI/hepatic ROS   (+)             liver disease,    (-) GERD   Renal/Genitourinary:  - neg Renal ROS     Endo:  - neg endo ROS   (+)  type II DM,                    Psychiatric/Substance Use:  - neg psychiatric ROS     Infectious Disease:  - neg infectious disease ROS     Malignancy:       Other:            Physical Exam    Airway        Mallampati: III  "  TM distance: > 3 FB   Neck ROM: full     Respiratory Devices and Support         Dental       (+) Completely normal teeth      Cardiovascular          Rhythm and rate: regular and normal (-) no murmur    Pulmonary           breath sounds clear to auscultation           OUTSIDE LABS:  CBC:   Lab Results   Component Value Date    WBC 2.3 (L) 12/11/2024    WBC 2.3 (L) 11/12/2024    HGB 13.3 01/24/2025    HGB 13.8 01/10/2025    HCT 37.0 (L) 01/24/2025    HCT 40.1 01/10/2025    PLT 50 (L) 12/11/2024    PLT 50 (L) 11/12/2024     BMP:   Lab Results   Component Value Date     01/10/2025     12/11/2024    POTASSIUM 4.6 01/10/2025    POTASSIUM 4.7 12/11/2024    CHLORIDE 109 (H) 01/10/2025    CHLORIDE 106 12/11/2024    CO2 23 01/10/2025    CO2 24 12/11/2024    BUN 15.7 01/10/2025    BUN 16.7 12/11/2024    CR 0.86 01/10/2025    CR 0.72 12/11/2024     (H) 01/10/2025     (H) 12/11/2024     COAGS:   Lab Results   Component Value Date    PTT 36 08/21/2023    INR 1.28 (H) 11/12/2024     POC: No results found for: \"BGM\", \"HCG\", \"HCGS\"  HEPATIC:   Lab Results   Component Value Date    ALBUMIN 3.8 01/10/2025    PROTTOTAL 7.2 01/10/2025    ALT 66 01/10/2025    AST 61 (H) 01/10/2025    ALKPHOS 207 (H) 01/10/2025    BILITOTAL 1.6 (H) 01/10/2025     OTHER:   Lab Results   Component Value Date    A1C 8.6 (H) 10/25/2024    NAMRATA 9.3 01/10/2025    TSH 2.61 08/14/2023    SED 16 01/30/2024       Anesthesia Plan    ASA Status:  2    NPO Status:  NPO Appropriate    Anesthesia Type: MAC.   Induction: Propofol.   Maintenance: TIVA.        Consents    Anesthesia Plan(s) and associated risks, benefits, and realistic alternatives discussed. Questions answered and patient/representative(s) expressed understanding.     - Discussed: Risks, Benefits and Alternatives for BOTH SEDATION and the PROCEDURE were discussed     - Discussed with:  Patient      - Extended Intubation/Ventilatory Support Discussed: No.      - Patient is " DNR/DNI Status: No     Use of blood products discussed: No .     Postoperative Care       PONV prophylaxis: Ondansetron (or other 5HT-3)     Comments:               Marco Rosales MD    I have reviewed the pertinent notes and labs in the chart from the past 30 days and (re)examined the patient.  Any updates or changes from those notes are reflected in this note.    Clinically Significant Risk Factors Present on Admission                            # DMII: A1C = N/A within past 6 months

## 2025-02-20 NOTE — H&P
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)       Past Surgical History:    Past Surgical History:   Procedure Laterality Date    APPENDECTOMY      COLONOSCOPY N/A 05/21/2021    Procedure: COLONOSCOPY;  Surgeon: Hipolito Alexander MD;  Location: [facility]    IR LIVER BIOPSY PERCUTANEOUS  1/5/2024    PERCUTANEOUS BIOPSY LIVER N/A 1/5/2024    Procedure: Percutaneous biopsy liver;  Surgeon: Alvarez Alexander MD;  Location: [facility] [place] [facility]       Past Medical History:   Past Medical History:   Diagnosis Date    Cirrhosis of liver (H)     H63D heterozygote    Diabetes mellitus, type 2 (H)     Hyperlipidemia        Social History:   Social History     Tobacco Use    Smoking status: Never     Passive exposure: Past    Smokeless tobacco: Never   Substance Use Topics    Alcohol use: Yes     Comment: rare       Family History:   Family History   Problem Relation Age of Onset    Diabetes Mother     Heart Disease Mother         stent placement.    Morbid Obesity Mother         bypass    Valvular heart disease Father     Diabetes Brother     Liver Disease No family hx of        Allergies: No Known Allergies    Active Medications:   Current Outpatient Medications   Medication Sig Dispense Refill    metFORMIN (GLUCOPHAGE XR) 500 MG 24 hr tablet Take 1 tablet (500 mg) by mouth daily (with dinner). 90 tablet 3    Vitamin E 90 MG (200 UNIT) capsule Take 200 Units by mouth daily      atorvastatin (LIPITOR) 40 MG tablet Take 1 tablet (40 mg) by mouth daily. (Patient not taking: Reported on 12/11/2024) 90 tablet 3    blood glucose (NO BRAND SPECIFIED) lancets standard Use to test blood sugar twice daily times daily or as directed.  "200 Lancet 3    blood glucose (NO BRAND SPECIFIED) test strip Use to test blood sugar twice daily times daily or as directed. 200 strip 3    blood glucose monitoring (NO BRAND SPECIFIED) meter device kit Use to test blood sugar twice daily times daily or as directed. 1 kit 3    Continuous Glucose Sensor (DEXCOM G7 SENSOR) MISC 1 each every 10 days. Change sensor every 10 days (Patient not taking: Reported on 11/12/2024) 9 each 5    gabapentin (NEURONTIN) 300 MG capsule Take 1 capsule (300 mg) by mouth nightly as needed for neuropathic pain (Patient not taking: Reported on 9/18/2024) 30 capsule 3    SHINGRIX injection          Systemic Review:   ENT/MOUTH: NEGATIVE for ear, mouth and throat problems  RESP: NEGATIVE for significant cough or SOB  CV: NEGATIVE for chest pain, palpitations or peripheral edema    Physical Examination:   Vital Signs: /81 (BP Location: Right arm)   Pulse 75   Temp 97  F (36.1  C) (Temporal)   Resp 18   Ht 1.88 m (6' 2\")   Wt 88.5 kg (195 lb)   SpO2 99%   BMI 25.04 kg/m    GENERAL: healthy, alert and no distress  NECK: no adenopathy, no asymmetry, masses, or scars  RESP: lungs clear to auscultation - no rales, rhonchi or wheezes  CV: regular rate and rhythm, normal S1 S2, no S3 or S4, no murmur, click or rub, no peripheral edema and peripheral pulses strong  ABDOMEN: soft, nontender, no hepatosplenomegaly, no masses and bowel sounds normal  MS: no gross musculoskeletal defects noted, no edema    Plan: Appropriate to proceed as scheduled.      Piedad Smith MD  2/20/2025    PCP:  Raj Almazan    "

## 2025-02-20 NOTE — ANESTHESIA CARE TRANSFER NOTE
Patient: Demetrius Burton    Procedure: Procedure(s):  ESOPHAGOGASTRODUODENOSCOPY, WITH BANDING OF VARICES       Diagnosis: Other cirrhosis of liver (H) [K74.69]  Diagnosis Additional Information: No value filed.    Anesthesia Type:   MAC     Note:    Oropharynx: oropharynx clear of all foreign objects and spontaneously breathing  Level of Consciousness: awake  Oxygen Supplementation: room air    Independent Airway: airway patency satisfactory and stable  Dentition: dentition unchanged  Vital Signs Stable: post-procedure vital signs reviewed and stable  Report to RN Given: handoff report given  Patient transferred to: Phase II    Handoff Report: Identifed the Patient, Identified the Reponsible Provider, Reviewed the pertinent medical history, Discussed the surgical course, Reviewed Intra-OP anesthesia mangement and issues during anesthesia, Set expectations for post-procedure period and Allowed opportunity for questions and acknowledgement of understanding      Vitals:  Vitals Value Taken Time   BP 90/62    Temp 36    Pulse 84    Resp 16    SpO2 98 % 02/20/25 0959   Vitals shown include unfiled device data.    Electronically Signed By: EDUARDO Bahena CRNA  February 20, 2025  9:59 AM

## 2025-02-20 NOTE — ANESTHESIA POSTPROCEDURE EVALUATION
Patient: Demetrius Burton    Procedure: Procedure(s):  ESOPHAGOGASTRODUODENOSCOPY, WITH BANDING OF VARICES       Anesthesia Type:  MAC    Note:  Disposition: Outpatient   Postop Pain Control: Uneventful            Sign Out: Well controlled pain   PONV: No   Neuro/Psych: Uneventful            Sign Out: Acceptable/Baseline neuro status   Airway/Respiratory: Uneventful            Sign Out: Acceptable/Baseline resp. status   CV/Hemodynamics: Uneventful            Sign Out: Acceptable CV status; No obvious hypovolemia; No obvious fluid overload   Other NRE: NONE   DID A NON-ROUTINE EVENT OCCUR? No           Last vitals:  Vitals Value Taken Time   /78 02/20/25 1020   Temp 36.2  C (97.1  F) 02/20/25 1020   Pulse 73 02/20/25 1020   Resp 14 02/20/25 1020   SpO2 99 % 02/20/25 1025   Vitals shown include unfiled device data.    Electronically Signed By: Marco Rosales MD  February 20, 2025  1:12 PM

## 2025-03-17 ENCOUNTER — TELEPHONE (OUTPATIENT)
Dept: GASTROENTEROLOGY | Facility: CLINIC | Age: 60
End: 2025-03-17
Payer: COMMERCIAL

## 2025-03-17 ENCOUNTER — HOSPITAL ENCOUNTER (OUTPATIENT)
Facility: AMBULATORY SURGERY CENTER | Age: 60
End: 2025-03-17
Attending: STUDENT IN AN ORGANIZED HEALTH CARE EDUCATION/TRAINING PROGRAM
Payer: COMMERCIAL

## 2025-03-17 NOTE — TELEPHONE ENCOUNTER
"Endoscopy Scheduling Screen    Have you had any respiratory illness or flu-like symptoms in the last 10 days?  No    What is your communication preference for Instructions and/or Bowel Prep?   MyChart    What insurance is in the chart?  Other:  [facility]    Ordering/Referring Provider: Piedad Smith   (If ordering provider performs procedure, schedule with ordering provider unless otherwise instructed. )    BMI: Estimated body mass index is 25.04 kg/m  as calculated from the following:    Height as of 2/20/25: 1.88 m (6' 2\").    Weight as of 2/20/25: 88.5 kg (195 lb).     Sedation Ordered  MAC/deep sedation.   BMI<= 45 45 < BMI <= 48 48 < BMI < = 50  BMI > 50   No Restrictions No [facility]  No [facility]  [facility] with exceptions Hospital Only OR Only       Do you have a history of malignant hyperthermia?  No    (Females) Are you currently pregnant?        Have you been diagnosed or told you have pulmonary hypertension?   No    Do you have an LVAD?  No    Have you been told you have moderate to severe sleep apnea?  No.    Have you been told you have COPD, asthma, or any other lung disease?  No    Has your doctor ordered any cardiac tests like echo, angiogram, stress test, ablation, or EKG, that you have not completed yet?  No    Do you  have a history of any heart conditions?  No     Have you ever had or are you waiting for an organ transplant?  No. Continue scheduling, no site restrictions.    Have you had a stroke or transient ischemic attack (TIA aka \"mini stroke\") in the last 2 years?   No.    Have you been diagnosed with or been told you have cirrhosis of the liver?   Yes. (RN Review required for scheduling unless scheduling in Hospital.)  Per nurse triage notes okay for [facility] as he had his last one at [facility] on 2/20/25  Are you currently on dialysis?   No    Do you need assistance transferring?   No    BMI: Estimated body mass index is 25.04 kg/m  as calculated from the following:    Height as of 2/20/25: 1.88 m (6' 2\").    " Weight as of 2/20/25: 88.5 kg (195 lb).     Is patients BMI > 40 and scheduling location [facility]?  No    Do you take an injectable or oral medication for weight loss or diabetes (excluding insulin)?  Yes, hold time can be up to 7 days. Please consult with you prescribing provider to discuss endoscopy recommendations. (Please schedule at least 7 days out.)    Do you take the medication Naltrexone?  No    Do you take blood thinners?  No       Prep   Are you currently on dialysis or do you have chronic kidney disease?  No    Do you have a diagnosis of diabetes?  Yes ([facility] Prep)    Do you have a diagnosis of cystic fibrosis (CF)?  No    On a regular basis do you go 3 -5 days between bowel movements?  No    BMI > 40?  No    Preferred Pharmacy:    [facility] [id number] [place] [address] [place] [address] [facility] [address]  Phone: [phone] Fax: [phone]      Final Scheduling Details     Procedure scheduled  Upper endoscopy (EGD)    Surgeon:  Sarah     Date of procedure:  6/12/25      Pre-OP / PAC:   No - Not required for this site.    Location  [facility] - Per order.    Sedation   MAC/Deep Sedation - Per order.      Patient Reminders:   You will receive a call from a Nurse to review instructions and health history.  This assessment must be completed prior to your procedure.  Failure to complete the Nurse assessment may result in the procedure being cancelled.      On the day of your procedure, please designate an adult(s) who can drive you home stay with you for the next 24 hours. The medicines used in the exam will make you sleepy. You will not be able to drive.      You cannot take public transportation, ride share services, or non-medical taxi service without a responsible caregiver.  Medical transport services are allowed with the requirement that a responsible caregiver will receive you at your destination.  We require that drivers and caregivers are confirmed prior to your procedure.

## 2025-04-07 ENCOUNTER — LAB (OUTPATIENT)
Dept: LAB | Facility: CLINIC | Age: 60
End: 2025-04-07
Payer: COMMERCIAL

## 2025-04-07 DIAGNOSIS — R77.2 ELEVATED AFP: ICD-10-CM

## 2025-04-07 DIAGNOSIS — E11.40 TYPE 2 DIABETES MELLITUS WITH DIABETIC NEUROPATHY, WITHOUT LONG-TERM CURRENT USE OF INSULIN (H): ICD-10-CM

## 2025-04-07 DIAGNOSIS — E83.110 HEREDITARY HEMOCHROMATOSIS: ICD-10-CM

## 2025-04-07 DIAGNOSIS — K74.60 HEPATIC CIRRHOSIS, UNSPECIFIED HEPATIC CIRRHOSIS TYPE, UNSPECIFIED WHETHER ASCITES PRESENT (H): ICD-10-CM

## 2025-04-07 DIAGNOSIS — E83.19 IRON OVERLOAD: ICD-10-CM

## 2025-04-07 DIAGNOSIS — E11.9 DIABETES MELLITUS, TYPE 2 (H): Primary | ICD-10-CM

## 2025-04-07 LAB
ALBUMIN SERPL BCG-MCNC: 3.5 G/DL (ref 3.5–5.2)
ALP SERPL-CCNC: 222 U/L (ref 40–150)
ALT SERPL W P-5'-P-CCNC: 67 U/L (ref 0–70)
ANION GAP SERPL CALCULATED.3IONS-SCNC: 11 MMOL/L (ref 7–15)
AST SERPL W P-5'-P-CCNC: 71 U/L (ref 0–45)
BILIRUB DIRECT SERPL-MCNC: 0.55 MG/DL (ref 0–0.3)
BILIRUB SERPL-MCNC: 1.4 MG/DL
BILIRUB SERPL-MCNC: 1.4 MG/DL
BUN SERPL-MCNC: 12.8 MG/DL (ref 8–23)
CALCIUM SERPL-MCNC: 9.2 MG/DL (ref 8.8–10.4)
CHLORIDE SERPL-SCNC: 106 MMOL/L (ref 98–107)
CREAT SERPL-MCNC: 0.72 MG/DL (ref 0.67–1.17)
EGFRCR SERPLBLD CKD-EPI 2021: >90 ML/MIN/1.73M2
ERYTHROCYTE [DISTWIDTH] IN BLOOD BY AUTOMATED COUNT: 13.5 % (ref 10–15)
EST. AVERAGE GLUCOSE BLD GHB EST-MCNC: 180 MG/DL
FERRITIN SERPL-MCNC: 59 NG/ML (ref 31–409)
GLUCOSE SERPL-MCNC: 238 MG/DL (ref 70–99)
HBA1C MFR BLD: 7.9 % (ref 0–5.6)
HCO3 SERPL-SCNC: 20 MMOL/L (ref 22–29)
HCT VFR BLD AUTO: 37.6 % (ref 40–53)
HGB BLD-MCNC: 13.3 G/DL (ref 13.3–17.7)
INR PPP: 1.2 (ref 0.85–1.15)
IRON BINDING CAPACITY (ROCHE): 329 UG/DL (ref 240–430)
IRON SATN MFR SERPL: 47 % (ref 15–46)
IRON SERPL-MCNC: 156 UG/DL (ref 61–157)
MCH RBC QN AUTO: 33.8 PG (ref 26.5–33)
MCHC RBC AUTO-ENTMCNC: 35.4 G/DL (ref 31.5–36.5)
MCV RBC AUTO: 96 FL (ref 78–100)
PLATELET # BLD AUTO: 47 10E3/UL (ref 150–450)
POTASSIUM SERPL-SCNC: 3.9 MMOL/L (ref 3.4–5.3)
PROT SERPL-MCNC: 7.1 G/DL (ref 6.4–8.3)
RBC # BLD AUTO: 3.93 10E6/UL (ref 4.4–5.9)
SODIUM SERPL-SCNC: 137 MMOL/L (ref 135–145)
WBC # BLD AUTO: 2.2 10E3/UL (ref 4–11)

## 2025-04-07 PROCEDURE — 82247 BILIRUBIN TOTAL: CPT | Mod: 59

## 2025-04-07 PROCEDURE — 83540 ASSAY OF IRON: CPT

## 2025-04-07 PROCEDURE — 85610 PROTHROMBIN TIME: CPT

## 2025-04-07 PROCEDURE — 36415 COLL VENOUS BLD VENIPUNCTURE: CPT

## 2025-04-07 PROCEDURE — 82105 ALPHA-FETOPROTEIN SERUM: CPT

## 2025-04-07 PROCEDURE — 83550 IRON BINDING TEST: CPT

## 2025-04-07 PROCEDURE — 83036 HEMOGLOBIN GLYCOSYLATED A1C: CPT

## 2025-04-07 PROCEDURE — 82248 BILIRUBIN DIRECT: CPT

## 2025-04-07 PROCEDURE — 80053 COMPREHEN METABOLIC PANEL: CPT

## 2025-04-07 PROCEDURE — 85027 COMPLETE CBC AUTOMATED: CPT

## 2025-04-07 PROCEDURE — 82728 ASSAY OF FERRITIN: CPT

## 2025-04-08 LAB — AFP SERPL-MCNC: 9.8 NG/ML

## 2025-04-08 NOTE — RESULT ENCOUNTER NOTE
Isidro Burton,     It is a pleasure providing you with medical care. I have received and reviewed your results, and have the following recommendations:     Your A1c results are improved compared to 5 months ago.  You should be really proud of the hard work you put into bring these values down! Please continue to keep up with the good work!       Sincerely,     Alfreda Salas MD

## 2025-04-09 ENCOUNTER — MYC MEDICAL ADVICE (OUTPATIENT)
Dept: FAMILY MEDICINE | Facility: CLINIC | Age: 60
End: 2025-04-09
Payer: COMMERCIAL

## 2025-04-24 ENCOUNTER — OFFICE VISIT (OUTPATIENT)
Dept: FAMILY MEDICINE | Facility: CLINIC | Age: 60
End: 2025-04-24
Payer: COMMERCIAL

## 2025-04-24 VITALS
HEART RATE: 66 BPM | WEIGHT: 205 LBS | TEMPERATURE: 97.7 F | DIASTOLIC BLOOD PRESSURE: 74 MMHG | BODY MASS INDEX: 26.31 KG/M2 | HEIGHT: 74 IN | SYSTOLIC BLOOD PRESSURE: 118 MMHG | OXYGEN SATURATION: 97 % | RESPIRATION RATE: 18 BRPM

## 2025-04-24 DIAGNOSIS — E11.69 TYPE 2 DIABETES MELLITUS WITH OTHER SPECIFIED COMPLICATION, WITHOUT LONG-TERM CURRENT USE OF INSULIN (H): ICD-10-CM

## 2025-04-24 RX ORDER — METFORMIN HYDROCHLORIDE 500 MG/1
1000 TABLET, EXTENDED RELEASE ORAL
Qty: 90 TABLET | Refills: 3 | Status: SHIPPED | OUTPATIENT
Start: 2025-04-24

## 2025-04-24 RX ORDER — GLIPIZIDE 5 MG/1
5 TABLET, FILM COATED, EXTENDED RELEASE ORAL DAILY
Qty: 90 TABLET | Refills: 3 | Status: SHIPPED | OUTPATIENT
Start: 2025-04-24

## 2025-04-24 ASSESSMENT — PAIN SCALES - GENERAL: PAINLEVEL_OUTOF10: NO PAIN (0)

## 2025-04-24 NOTE — PROGRESS NOTES
"  Assessment & Plan     Type 2 diabetes mellitus with other specified complication, without long-term current use of insulin (H)  > patient's A1c from 2 weeks ago was 7.9%, He does have a hard time with exercising due to left-sided hip pain, his diet does not appear to be inappropriate for diabetes   - Plan to increase dose of metformin from 500mg daily to metFORMIN (GLUCOPHAGE XR) 500 MG 24 hr tablet; Take 2 tablets (1,000 mg) by mouth daily (with dinner).  - Plan to start the patient on glipiZIDE (GLUCOTROL XL) 5 MG 24 hr tablet; Take 1 tablet (5 mg) by mouth daily.  - patient was nervous about side effects from GLP1s, will hold off for now   - Patient made aware to check his fasting blood sugars daily for the next 2 weeks, if his fasting glucose levels are less than 110-120 he can stop the glipizide and we can decrease the dose to 2.5mg     Follow-up plan:   - Patient aware to send me a [email or web address] message in 2 weeks with his daily fasting blood glucose levels  - Based on the results of the blood glucose levels, can consider increasing dose of glipizide further versus potentially sending him back to diabetic education       BMI  Estimated body mass index is 26.32 kg/m  as calculated from the following:    Height as of this encounter: 1.88 m (6' 2\").    Weight as of this encounter: 93 kg (205 lb).       Yuval Romo is a 60 year old, presenting for the following health issues:  Diabetes        4/24/2025     8:07 AM   Additional Questions   Roomed by Kaylee Zapata CMA     History of Present Illness       Diabetes:   He presents for follow up of diabetes.  He is checking home blood glucose a few times a month.   He checks blood glucose before meals.  Blood glucose is sometimes over 200 and never under 70.  When his blood glucose is low, the patient is asymptomatic for confusion, blurred vision, lethargy and reports not feeling dizzy, shaky, or weak.  He is concerned about blood sugar frequently over 200.   He " "is having numbness in feet, blurry vision and weight loss.  The patient has not had a diabetic eye exam in the last 12 months.          He eats 2-3 servings of fruits and vegetables daily.He consumes 1 sweetened beverage(s) daily.He exercises with enough effort to increase his heart rate 10 to 19 minutes per day.  He exercises with enough effort to increase his heart rate 3 or less days per week. He is missing 1 dose(s) of medications per week.  He is not taking prescribed medications regularly due to remembering to take.        Diabetes is managed with metformin   Insurance wasn't covering his CGM   Admits that he doesn't check his BG daily   Admits he has a hard time getting his   Lowest BG he had fasting on a manual was 120 but patient admits that he hasn't checked in a while   Regarding diet his wife is vegan and with his diagnosis of hemochromatosis he's been limiting meat intake   Has oats and yogurt with fruits, during lunch he'll have salad, two nights per week he'll have chicken or fish, no sweetened drinks or alcohol   He was on the treadmill for a while, but his left hip is uncomfortable and he was told by his chiropractor to limit exercising   He and his wife usually take a walk after dinner, he is active at work       Review of Systems   Constitutional:  Negative for chills and fever.   HENT:  Negative for ear pain.    Eyes:  Negative for pain.   Respiratory:  Negative for cough.    Cardiovascular:  Negative for chest pain.   Gastrointestinal:  Negative for abdominal pain.   Genitourinary:  Negative for dysuria.   Musculoskeletal:  Negative for neck pain.   Skin:  Negative for rash.   Neurological:  Negative for headaches.           Objective    /74   Pulse 66   Temp 97.7  F (36.5  C) (Tympanic)   Resp 18   Ht 1.88 m (6' 2\")   Wt 93 kg (205 lb)   SpO2 97%   BMI 26.32 kg/m    Body mass index is 26.32 kg/m .  Physical Exam  Constitutional:       General: He is not in acute distress.  HENT:    "   Head: Normocephalic and atraumatic.      Right Ear: External ear normal.      Left Ear: External ear normal.      Mouth/Throat:      Mouth: Mucous membranes are moist.      Pharynx: Oropharynx is clear. No oropharyngeal exudate or posterior oropharyngeal erythema.   Eyes:      Extraocular Movements: Extraocular movements intact.   Cardiovascular:      Rate and Rhythm: Normal rate and regular rhythm.      Heart sounds: Normal heart sounds.   Pulmonary:      Effort: Pulmonary effort is normal. No respiratory distress.      Breath sounds: Normal breath sounds. No wheezing or rhonchi.   Abdominal:      Palpations: Abdomen is soft. There is no mass.      Tenderness: There is no abdominal tenderness.   Musculoskeletal:         General: No deformity. Normal range of motion.      Cervical back: Normal range of motion and neck supple.   Skin:     General: Skin is warm.      Findings: No rash.   Neurological:      General: No focal deficit present.      Mental Status: He is alert and oriented to person, place, and time.   Psychiatric:         Mood and Affect: Mood normal.              Signed Electronically by: CK POWELL MD

## 2025-04-24 NOTE — PATIENT INSTRUCTIONS
Hello! It was a pleasure seeing you today. Just some things we discussed at today's visit:     Diabetes    Will increase metformin from 500mg daily to 1000mg daily   Plan to start you on glipizide 5mg daily   Check your glucose/blood sugars daily when you wake up and write down the values on a log   After 2 weeks send me a [employer] message of your home glucose/sugar readings    If you are experiencing symptoms associated with low blood sugars, and/or if your fasting glucose is consistently less than 120 then stop the glipizide and we can consider decreasing that dose to 2.5mg     Sincerely,     Alfreda Salas MD

## 2025-05-09 NOTE — PROGRESS NOTES
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     Demetrius has done very well with phlebotomies and iron levels have come down nicely with the H63D.  Is interesting his wife tells me that his son Fabricio had an elevated ferritin of around 500 and gene testing This signifies that his wife Francheska has the C282Y HFE mutation and that she should be tested.  His other son had a ferritin more in the range of 250-300.  I mentioned that it is likely also a HFE mutation carrier. For Demetrius I would like to hold off on phlebotomies for a while since he is an H63D and only do phlebotomies if his ferritin is 100 or greater.  Based on today's ferritin level we will decide how often he should get blood testing at [facility] and phlebotomies.  I told him that if he gets the lab test I can then MyChart him and let him know if he needs a phlebotomy or not.  He should stay on the vitamin EE and I will see him again in 6 months time.  I am glad that he is getting aggressive treatment for his diabetes with metformin and glipizide He remains off alcohol   His platelet count is stable at [address] low due to portal hypertension    Summary of Recommendations:  Phlebotomy  if ferrtiin >100 for hematocrit>33% at [facility] to remove iron Avoid ASA  Good diabetes control, Ophtho exam  Vitamin E 200 units a day as an antioxidant  RTC in 6months  Thank you for involving us in the care of this  patient.     I spent   30 minutes on the date of the encounter doing chart review, history  and exam, documentation and further coordination as noted above The longitudinal plan of care for the diagnosis(es)/condition(s) as documented were addressed during this visit. Due to the added complexity in care, I will continue to support Demetrius in the subsequent management and with ongoing continuity of care.      Kishor Andrea MD            History of Present Illness:   History obtained from chart review and confirmed with patient.    Demetrius Burton is a 60 year old who was in good health until earlier this year when he was diagnosed with diabetes in the setting of a 30 pound unintentional weight loss.  He was started on metformin and jardiance and his hemoglobin A1c dropped from 9.5% to 5.9%.  The jardiance was discontinued. He also has noticed some neuropathy in his feet with pain to touch in his toes and top of his feet. He has had COVID-19 twice in the last couple of years.  His first diagnosis was in February 2022  which lasted  a month treated with antibiotics and steroids.  He then got COVID-19 again in September 2023.    He denies any history of jaundice, abdominal swelling, lower extremity swelling, slowness of thought or confusion.  Denies any diarrhea or constipation, melena or hematochezia.  He denies any nausea or vomiting in the setting of his unintentional weight loss.He denies any known family history of liver disease.  He has several family members with diabetes.  He denies any over-the-counter medications or supplements except for a multivitamin.  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 Throughout his life he has not had issues with alcohol overuse; he drinks about 1 alcohol-containing beverage per month.He was seen by Dr Lambert in Hepatology. CRISTINA , anti actin and hepatitis serologies were negative. He did have an [id number] heterozygosity and an alpha 1 anti trypsin S  heterozygosity. No tylenol abuse.    INTERVAL HISTORY.   He had a cold over the winter for about a week.No antibiotic given He is taking Vit E 200u/d  He has had phlebotomies every 3 weeks at [facility] last done in February. April ferritin was 59.He does get light headed after the phleb and saline infusion helps. No significant alcohol.Continues  on metformin and now doubled the dose and on glipizide.Blood glucose in am 100-120.  No black or blood stools.Carvedilol added for portal hypertension in past month Less pain in feet and toes.Of ntoe his sone Fabricio had ferritin ~ 500 and HFE of H63D and C282Y    A comprehensive ROS was performed with the patient and was found to be negative with the exception of that noted in the HPI above.  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  PSYCH Mood ok  SKIN He sunburns easily no skin cancer  ID Had Covid in 2022   with pneumonia with difficulty breathing on antibiotics and steroids lasted a month Had COVID in Oct 2023 lasted a few days no paxlovid  HEME No anemia, no DVT Eats meat was eating venison, not using iron pans less Vit C No blood transfusions No bleeding or petechiae He has had nose bleeding   No UTI occ nocturia         Past Medical History:     Past Medical History:   Diagnosis Date    Cirrhosis of liver (H)     H63D heterozygote    Diabetes mellitus, type 2 (H)     Hyperlipidemia             Past Surgical  History:     Past Surgical History:   Procedure Laterality Date    APPENDECTOMY      COLONOSCOPY N/A 05/21/2021    Procedure: COLONOSCOPY;  Surgeon: Hipolito Alexander MD;  Location: [facility]    IR LIVER BIOPSY PERCUTANEOUS  1/5/2024    PERCUTANEOUS BIOPSY LIVER N/A 1/5/2024    Procedure: Percutaneous biopsy liver;  Surgeon: Alvarez Alexander MD;  Location: 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 His wife Francheska also works for [employer]  Social History     Socioeconomic History    Marital status:      Spouse name: Not on file    Number of children: Not on file    Years of education: Not on file    Highest education level: Not on file   Occupational History    Not on file   Tobacco Use    Smoking status: Never     Passive exposure: Past    Smokeless tobacco: Never   Vaping Use    Vaping status: Never Used   Substance and Sexual Activity    Alcohol use: Yes     Comment: rare    Drug use: No    Sexual activity: Not on file   Other Topics Concern    Parent/sibling w/ CABG, MI or angioplasty before 65F 55M? No   Social History Narrative    Not on file     Social Drivers of Health     Financial Resource Strain: Low Risk  (10/25/2024)    Financial Resource Strain     Within the past 12 months, have you or your family members you live with been unable to get utilities (heat, electricity) when it was really needed?: No   Food Insecurity: Low Risk  (10/25/2024)    Food Insecurity     Within the past 12 months, did you worry that your food would run out before you got money to buy more?: No     Within the past 12 months, did the food you bought just not last and you didn t have money to get more?: No   Transportation Needs: Low Risk  (10/25/2024)    Transportation Needs     Within the past 12 months, has lack of transportation kept you from medical appointments, getting your medicines, non-medical meetings or appointments, work, or from getting  things that you need?: No   Physical Activity: Unknown (10/25/2024)    Exercise Vital Sign     Days of Exercise per Week: 3 days     Minutes of Exercise per Session: Not on file   Stress: No Stress Concern Present (10/25/2024)    [employer] [place] [employer] - Occupational Stress Questionnaire     Feeling of Stress : Not at all   Social Connections: Unknown (10/25/2024)    Social Connection and Isolation Panel [NHANES]     Frequency of Communication with Friends and Family: Not on file     Frequency of Social Gatherings with Friends and Family: Once a week     Attends [employer] Services: Not on file     Active Member of Clubs or Organizations: Not on file     Attends Club or Organization Meetings: Not on file     Marital Status: Not on file   Interpersonal Safety: Low Risk  (2/20/2025)    Interpersonal Safety     Do you feel physically and emotionally safe where you currently live?: Yes     Within the past 12 months, have you been hit, slapped, kicked or otherwise physically hurt by someone?: No     Within the past 12 months, have you been humiliated or emotionally abused in other ways by your partner or ex-partner?: No   Housing Stability: Low Risk  (10/25/2024)    Housing Stability     Do you have housing? : Yes     Are you worried about losing your housing?: No            Family History:   [place] ancestry  Family History   Problem Relation Age of Onset    Diabetes Mother     Heart Disease Mother         stent placement.    Morbid Obesity Mother         bypass    Valvular heart disease Father     Diabetes Brother     Liver Disease No family hx of    Has 2 children 29 Fabricio and Ric 27 both healthy  Younger brother has DM and skin cancer  No hx of iron overload  Father had nerve deafness and older brother had hearing aids in his 20s. No kidney issues       Allergies:   No Known Allergies         Medications:       PHYSICAL EXAMINATION:   GENERAL:  Patient is alert in no acute distress.  VITAL SIGNS:  There were no vitals taken for this visit.   HEENT:  Normocephalic.  EOM okay, no scleral icterus.  Mouth without lesion.   RESPIRATORY:  Clear to percussion and auscultation.   CARDIAC:  Normal sinus rhythm.  No S3, S4 or murmur.   ABDOMEN:  Soft spleen tip palpable BCM     EXTREMITIES: no edema.   NEUROLOGIC:  Grossly normal.   MS No arthritis  SKIN Stasis dermatitis right lower leg. Abrasion left shin  PSYCH Mood appropriate             Data:   I have personally reviewed the following labs/imaging:  CBC@LABRCNTIPR(wbc:4,rbc:4,hgb:4,hct:4,mcv:4,mch:4,mchc:4,rdw:4,plt:4)@  CMP@LABRCNTIPR(na:4,potassium:4,chloride:4,co2:4,aniongap:4,g,bun:4,cr:4,gfrestimated:4,gfrestblack:4,hiram:4,ma,phos:4,prottotal:4,albumin:4,bilitotal:4,alkphos:   Latest Reference Range & Units 25 11:37   Sodium 135 - 145 mmol/L 139   Potassium 3.4 - 5.3 mmol/L 4.0   Chloride 98 - 107 mmol/L 112 (H)   Carbon Dioxide (CO2) 22 - 29 mmol/L 19 (L)   Urea Nitrogen 8.0 - 23.0 mg/dL 12.9   Creatinine 0.67 - 1.17 mg/dL 0.71   GFR Estimate >60 mL/min/1.73m2 >90   Calcium 8.8 - 10.4 mg/dL 8.6 (L)   Anion Gap 7 - 15 mmol/L 8   Albumin 3.5 - 5.2 g/dL 3.3 (L)   Protein Total 6.4 - 8.3 g/dL 6.7   Alkaline Phosphatase 40 - 150 U/L 190 (H)   ALT 0 - 70 U/L 65   AST 0 - 45 U/L 82 (H)   Bilirubin Total <=1.2 mg/dL 1.5 (H)   CRP Inflammation <5.00 mg/L <3.00   Glucose 70 - 99 mg/dL 163 (H)   Iron 61 - 157 ug/dL 169 (H)   Iron Binding Capacity 240 - 430 ug/dL 306   Iron Sat Index 15 - 46 % 55 (H)   WBC 4.0 - 11.0 10e3/uL 2.2 (L)   Hemoglobin 13.3 - 17.7 g/dL 12.9 (L)   Hematocrit 40.0 - 53.0 % 37.5 (L)   Platelet Count 150 - 450 10e3/uL 46 (LL)   RBC Count 4.40 - 5.90 10e6/uL 3.83 (L)   MCV 78 - 100 fL 98   MCH 26.5 - 33.0 pg 33.7 (H)   MCHC 31.5 - 36.5 g/dL 34.4   RDW 10.0 - 15.0 % 14.9   % Neutrophils % 51   % Lymphocytes % 30   % Monocytes % 14   % Eosinophils % 4   % Basophils % 1   % Immature Granulocytes % 0   NRBC/W <1 /100 0   Absolute  Neutrophil 1.6 - 8.3 10e3/uL 1.1 (L)   Absolute Lymphocytes 0.8 - 5.3 10e3/uL 0.7 (L)   Absolute Monocytes 0.0 - 1.3 10e3/uL 0.3   Absolute Eosinophils 0.0 - 0.7 10e3/uL 0.1   Absolute Basophils 0.0 - 0.2 10e3/uL 0.0   Absolute Immature Granulocytes <=0.4 10e3/uL 0.0   Absolute NRBCs 10e3/uL 0.0   RBC Morphology  Confirmed RBC Indices   Platelet Morphology Automated Count Confirmed. Platelet morphology is normal.  Automated Count Confirmed. Platelet morphology is normal.   (LL): Data is critically low  (H): Data is abnormally high  (L): Data is abnormally low

## 2025-05-13 ENCOUNTER — ONCOLOGY VISIT (OUTPATIENT)
Dept: TRANSPLANT | Facility: CLINIC | Age: 60
End: 2025-05-13
Attending: INTERNAL MEDICINE
Payer: COMMERCIAL

## 2025-05-13 ENCOUNTER — PATIENT OUTREACH (OUTPATIENT)
Dept: ONCOLOGY | Facility: CLINIC | Age: 60
End: 2025-05-13

## 2025-05-13 VITALS
DIASTOLIC BLOOD PRESSURE: 71 MMHG | SYSTOLIC BLOOD PRESSURE: 125 MMHG | OXYGEN SATURATION: 98 % | HEART RATE: 66 BPM | RESPIRATION RATE: 16 BRPM | BODY MASS INDEX: 25.74 KG/M2 | TEMPERATURE: 97.5 F | WEIGHT: 200.5 LBS

## 2025-05-13 DIAGNOSIS — E83.110 HEREDITARY HEMOCHROMATOSIS: ICD-10-CM

## 2025-05-13 DIAGNOSIS — E83.19 IRON OVERLOAD: ICD-10-CM

## 2025-05-13 DIAGNOSIS — E11.40 TYPE 2 DIABETES MELLITUS WITH DIABETIC NEUROPATHY, WITHOUT LONG-TERM CURRENT USE OF INSULIN (H): ICD-10-CM

## 2025-05-13 DIAGNOSIS — K74.60 HEPATIC CIRRHOSIS, UNSPECIFIED HEPATIC CIRRHOSIS TYPE, UNSPECIFIED WHETHER ASCITES PRESENT (H): ICD-10-CM

## 2025-05-13 LAB — CRP SERPL-MCNC: <3 MG/L

## 2025-05-13 PROCEDURE — 99214 OFFICE O/P EST MOD 30 MIN: CPT | Performed by: INTERNAL MEDICINE

## 2025-05-13 PROCEDURE — 36415 COLL VENOUS BLD VENIPUNCTURE: CPT | Performed by: INTERNAL MEDICINE

## 2025-05-13 PROCEDURE — 86140 C-REACTIVE PROTEIN: CPT | Performed by: INTERNAL MEDICINE

## 2025-05-13 PROCEDURE — G2211 COMPLEX E/M VISIT ADD ON: HCPCS | Performed by: INTERNAL MEDICINE

## 2025-05-13 PROCEDURE — G0463 HOSPITAL OUTPT CLINIC VISIT: HCPCS | Performed by: INTERNAL MEDICINE

## 2025-05-13 ASSESSMENT — PAIN SCALES - GENERAL: PAINLEVEL_OUTOF10: NO PAIN (0)

## 2025-05-13 NOTE — NURSING NOTE
"Oncology Rooming Note    May 13, 2025 11:42 AM   Demetrius Burton is a 60 year old male who presents for:    Chief Complaint   Patient presents with    Oncology Clinic Visit     Hereditary hemochromatosis; Iron overload    Blood Draw     Labs drawn via  by RN in lab.  VS taken     Initial Vitals: /71   Pulse 66   Temp 97.5  F (36.4  C) (Oral)   Resp 16   Wt 90.9 kg (200 lb 8 oz)   SpO2 98%   BMI 25.74 kg/m   Estimated body mass index is 25.74 kg/m  as calculated from the following:    Height as of 4/24/25: 1.88 m (6' 2\").    Weight as of this encounter: 90.9 kg (200 lb 8 oz). Body surface area is 2.18 meters squared.  No Pain (0) Comment: Data Unavailable   No LMP for male patient.  Allergies reviewed: Yes  Medications reviewed: Yes    Medications: Medication refills not needed today.  Pharmacy name entered into [facility]: [id number] [place] [id number]     Frailty Screening:   Is the patient here for a new oncology consult visit in cancer care? 2. No      Clinical concerns: Pt reports no new concerns today.       Carri Veliz, EMT     "

## 2025-05-13 NOTE — LETTER
5/13/2025      Demetrius Burton  [address] [facility] [address]      Dear Colleague,    Thank you for referring your patient, Demetrius Burton, to the [facility]ANSPL[facility] [place]. Please see a copy of my visit note below.        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     Demetrius has done very well with phlebotomies and iron levels have come down nicely with the H63D.  Is interesting his wife tells me that his son Fabricio had an elevated ferritin of around 500 and gene testing This signifies that his wife Francheska has the C282Y HFE mutation and that she should be tested.  His other son had a ferritin more in the range of 250-300.  I mentioned that it is likely also a HFE mutation carrier. For Demetrius I would like to hold off on phlebotomies for a while since he is an H63D and only do phlebotomies if his ferritin is 100 or greater.  Based on today's ferritin level we will decide how often he should get blood testing at [place] and phlebotomies.  I told him that if he gets the lab test I can then MyChart him and let him know if he needs a phlebotomy or not.  He should stay on the vitamin EE and I will see him again in 6 months time.  I am glad that he is getting aggressive treatment for his diabetes with metformin and glipizide He remains off alcohol   His platelet count is stable at [address]80 low due to portal  hypertension    Summary of Recommendations:  Phlebotomy  if ferrtiin >100 for hematocrit>33% at [place] to remove iron Avoid ASA  Good diabetes control, Ophtho exam  Vitamin E 200 units a day as an antioxidant  RTC in 6months  Thank you for involving us in the care of this patient.     I spent   30 minutes on the date of the encounter doing chart review, history  and exam, documentation and further coordination as noted above The longitudinal plan of care for the diagnosis(es)/condition(s) as documented were addressed during this visit. Due to the added complexity in care, I will continue to support Demetrius in the subsequent management and with ongoing continuity of care.      Kishor Andrea MD            History of Present Illness:   History obtained from chart review and confirmed with patient.    Demetrius Burton is a 60 year old who was in good health until earlier this year when he was diagnosed with diabetes in the setting of a 30 pound unintentional weight loss.  He was started on metformin and jardiance and his hemoglobin A1c dropped from 9.5% to 5.9%.  The jardiance was discontinued. He also has noticed some neuropathy in his feet with pain to touch in his toes and top of his feet. He has had COVID-19 twice in the last couple of years.  His first diagnosis was in February 2022  which lasted  a month treated with antibiotics and steroids.  He then got COVID-19 again in September 2023.    He denies any history of jaundice, abdominal swelling, lower extremity swelling, slowness of thought or confusion.  Denies any diarrhea or constipation, melena or hematochezia.  He denies any nausea or vomiting in the setting of his unintentional weight loss.He denies any known family history of liver disease.  He has several family members with diabetes.  He denies any over-the-counter medications or supplements except for a multivitamin.  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  Throughout his life he has not had issues with alcohol overuse; he drinks about 1 alcohol-containing beverage per month.He was seen by Dr Lambert in Hepatology. CRISTINA , anti actin and hepatitis serologies were negative. He did have an [id number] heterozygosity and an alpha 1 anti trypsin S heterozygosity. No tylenol abuse.    INTERVAL HISTORY.   He had a cold over the winter for about a week.No antibiotic given He is taking Vit E 200u/d  He has had phlebotomies every 3 weeks at [place] last done in February. April ferritin was 59.He does get light headed after the phleb and saline infusion helps. No significant alcohol.Continues  on metformin and now doubled the dose and on glipizide.Blood glucose in am 100-120.  No black or blood stools.Carvedilol added for portal hypertension in past month Less pain in feet and toes.Of ntoe his sone Fabricio had ferritin ~ 500 and HFE of H63D and C282Y    A comprehensive ROS was performed with the patient and was found to be negative with the exception of that noted in the HPI above.  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  PSYCH Mood ok  SKIN He sunburns easily no skin cancer  ID Had Covid in 2022   with pneumonia with difficulty breathing on antibiotics and steroids lasted a month Had COVID in Oct 2023 lasted a few days no paxlovid  HEME No anemia, no DVT Eats meat was eating venison, not using iron pans less Vit C No blood  transfusions No bleeding or petechiae He has had nose bleeding   No UTI occ nocturia         Past Medical History:     Past Medical History:   Diagnosis Date     Cirrhosis of liver (H)     H63D heterozygote     Diabetes mellitus, type 2 (H)      Hyperlipidemia             Past Surgical History:     Past Surgical History:   Procedure Laterality Date     APPENDECTOMY       COLONOSCOPY N/A 05/21/2021    Procedure: COLONOSCOPY;  Surgeon: Hipolito Alexander MD;  Location: [facility]     IR LIVER BIOPSY PERCUTANEOUS  1/5/2024     PERCUTANEOUS BIOPSY LIVER N/A 1/5/2024    Procedure: Percutaneous biopsy liver;  Surgeon: Alvarez Alexander MD;  Location: [facility] OR   Had small bowel resection with appendectomy 25 years ago         Social History:   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 His wife Francheska also works for [employer]  Social History     Socioeconomic History     Marital status:      Spouse name: Not on file     Number of children: Not on file     Years of education: Not on file     Highest education level: Not on file   Occupational History     Not on file   Tobacco Use     Smoking status: Never     Passive exposure: Past     Smokeless tobacco: Never   Vaping Use     Vaping status: Never Used   Substance and Sexual Activity     Alcohol use: Yes     Comment: rare     Drug use: No     Sexual activity: Not on file   Other Topics Concern     Parent/sibling w/ CABG, MI or angioplasty before 65F 55M? No   Social History Narrative     Not on file     Social Drivers of Health     Financial Resource Strain: Low Risk  (10/25/2024)    Financial Resource Strain      Within the past 12 months, have you or your family members you live with been unable to get utilities (heat, electricity) when it was really needed?: No   Food Insecurity: Low Risk  (10/25/2024)    Food Insecurity      Within the past 12 months, did you worry that your food would run out before you got money to buy more?: No      Within the past 12 months, did the food you  bought just not last and you didn t have money to get more?: No   Transportation Needs: Low Risk  (10/25/2024)    Transportation Needs      Within the past 12 months, has lack of transportation kept you from medical appointments, getting your medicines, non-medical meetings or appointments, work, or from getting things that you need?: No   Physical Activity: Unknown (10/25/2024)    Exercise Vital Sign      Days of Exercise per Week: 3 days      Minutes of Exercise per Session: Not on file   Stress: No Stress Concern Present (10/25/2024)    [employer] - Occupational Stress Questionnaire      Feeling of Stress : Not at all   Social Connections: Unknown (10/25/2024)    Social Connection and Isolation Panel [NHANES]      Frequency of Communication with Friends and Family: Not on file      Frequency of Social Gatherings with Friends and Family: Once a week      Attends Gnosticism Services: Not on file      Active Member of Clubs or Organizations: Not on file      Attends Club or Organization Meetings: Not on file      Marital Status: Not on file   Interpersonal Safety: Low Risk  (2/20/2025)    Interpersonal Safety      Do you feel physically and emotionally safe where you currently live?: Yes      Within the past 12 months, have you been hit, slapped, kicked or otherwise physically hurt by someone?: No      Within the past 12 months, have you been humiliated or emotionally abused in other ways by your partner or ex-partner?: No   Housing Stability: Low Risk  (10/25/2024)    Housing Stability      Do you have housing? : Yes      Are you worried about losing your housing?: No            Family History:   [place]ong[place]ian ancestry  Family History   Problem Relation Age of Onset     Diabetes Mother      Heart Disease Mother         stent placement.     Morbid Obesity Mother         bypass     Valvular heart disease Father      Diabetes Brother      Liver Disease No family hx of    Has 2 children 29 Fabricio  and Ric 27 both healthy  Younger brother has DM and skin cancer  No hx of iron overload  Father had nerve deafness and older brother had hearing aids in his 20s. No kidney issues       Allergies:   No Known Allergies         Medications:       PHYSICAL EXAMINATION:   GENERAL:  Patient is alert in no acute distress.  VITAL SIGNS: There were no vitals taken for this visit.   HEENT:  Normocephalic.  EOM okay, no scleral icterus.  Mouth without lesion.   RESPIRATORY:  Clear to percussion and auscultation.   CARDIAC:  Normal sinus rhythm.  No S3, S4 or murmur.   ABDOMEN:  Soft spleen tip palpable BCM     EXTREMITIES: no edema.   NEUROLOGIC:  Grossly normal.   MS No arthritis  SKIN Stasis dermatitis right lower leg. Abrasion left shin  PSYCH Mood appropriate             Data:   I have personally reviewed the following labs/imaging:  CBC@LABRCNTIPR(wbc:4,rbc:4,hgb:4,hct:4,mcv:4,mch:4,mchc:4,rdw:4,plt:4)@  CMP@LABRCNTIPR(na:4,potassium:4,chloride:4,co2:4,aniongap:4,g,bun:4,cr:4,gfrestimated:4,gfrestblack:4,hiram:4,ma,phos:4,prottotal:4,albumin:4,bilitotal:4,alkphos:   Latest Reference Range & Units 25 11:37   Sodium 135 - 145 mmol/L 139   Potassium 3.4 - 5.3 mmol/L 4.0   Chloride 98 - 107 mmol/L 112 (H)   Carbon Dioxide (CO2) 22 - 29 mmol/L 19 (L)   Urea Nitrogen 8.0 - 23.0 mg/dL 12.9   Creatinine 0.67 - 1.17 mg/dL 0.71   GFR Estimate >60 mL/min/1.73m2 >90   Calcium 8.8 - 10.4 mg/dL 8.6 (L)   Anion Gap 7 - 15 mmol/L 8   Albumin 3.5 - 5.2 g/dL 3.3 (L)   Protein Total 6.4 - 8.3 g/dL 6.7   Alkaline Phosphatase 40 - 150 U/L 190 (H)   ALT 0 - 70 U/L 65   AST 0 - 45 U/L 82 (H)   Bilirubin Total <=1.2 mg/dL 1.5 (H)   CRP Inflammation <5.00 mg/L <3.00   Glucose 70 - 99 mg/dL 163 (H)   Iron 61 - 157 ug/dL 169 (H)   Iron Binding Capacity 240 - 430 ug/dL 306   Iron Sat Index 15 - 46 % 55 (H)   WBC 4.0 - 11.0 10e3/uL 2.2 (L)   Hemoglobin 13.3 - 17.7 g/dL 12.9 (L)   Hematocrit 40.0 - 53.0 % 37.5 (L)   Platelet Count 150 -  450 10e3/uL 46 (LL)   RBC Count 4.40 - 5.90 10e6/uL 3.83 (L)   MCV 78 - 100 fL 98   MCH 26.5 - 33.0 pg 33.7 (H)   MCHC 31.5 - 36.5 g/dL 34.4   RDW 10.0 - 15.0 % 14.9   % Neutrophils % 51   % Lymphocytes % 30   % Monocytes % 14   % Eosinophils % 4   % Basophils % 1   % Immature Granulocytes % 0   NRBC/W <1 /100 0   Absolute Neutrophil 1.6 - 8.3 10e3/uL 1.1 (L)   Absolute Lymphocytes 0.8 - 5.3 10e3/uL 0.7 (L)   Absolute Monocytes 0.0 - 1.3 10e3/uL 0.3   Absolute Eosinophils 0.0 - 0.7 10e3/uL 0.1   Absolute Basophils 0.0 - 0.2 10e3/uL 0.0   Absolute Immature Granulocytes <=0.4 10e3/uL 0.0   Absolute NRBCs 10e3/uL 0.0   RBC Morphology  Confirmed RBC Indices   Platelet Morphology Automated Count Confirmed. Platelet morphology is normal.  Automated Count Confirmed. Platelet morphology is normal.   (LL): Data is critically low  (H): Data is abnormally high  (L): Data is abnormally low    Again, thank you for allowing me to participate in the care of your patient.        Sincerely,        Kishor Andrea MD    Electronically signed

## 2025-05-13 NOTE — NURSING NOTE
Chief Complaint   Patient presents with    Oncology Clinic Visit     Hereditary hemochromatosis; Iron overload    Blood Draw     Labs drawn via  by RN in lab.  VS taken       Labs collected from venipuncture by RN. Vitals taken. Checked in for appointment(s).    Cyndi Ross RN

## 2025-05-13 NOTE — PROGRESS NOTES
[facility]: Cancer Care                                                                                          RN received call from Catherine, lab staff, [facility], stating pt has critical level of plts at 46, today, 5/13/25.  RN updated Dr. Billy Andrea while he was in room with pt.  Low plts are not new for pt. 46k plts are stable with 47k results from 4/7/25.    Signature:  Shruthi Becker RN, OCN

## 2025-05-29 ENCOUNTER — TELEPHONE (OUTPATIENT)
Dept: GASTROENTEROLOGY | Facility: CLINIC | Age: 60
End: 2025-05-29
Payer: COMMERCIAL

## 2025-05-29 ENCOUNTER — HOSPITAL ENCOUNTER (OUTPATIENT)
Facility: CLINIC | Age: 60
End: 2025-05-29
Attending: INTERNAL MEDICINE | Admitting: INTERNAL MEDICINE
Payer: COMMERCIAL

## 2025-05-29 NOTE — TELEPHONE ENCOUNTER
Pre assessment completed for upcoming procedure.   (Please see previous telephone encounter notes for complete details)        Procedure details:    Arrival time and facility location reviewed.    Pre op exam needed? No.    Designated  policy reviewed and that site requests drivers to check in and stay on campus. Instructed to have someone stay 24  hours post procedure.   *Disclaimer - please notify the [facility] Op Manager with any  issues/concerns.    Medication review:    Medications reviewed. Please see supporting documentation below. Holding recommendations discussed (if applicable).       Prep for procedure:    Procedure prep instructions reviewed.        Any additional information needed:  N/A      Patient verbalized understanding and had no questions or concerns at this time.      Blessing Soares LPN  Endoscopy Procedure Pre Assessment   [phone] option 3

## 2025-05-29 NOTE — TELEPHONE ENCOUNTER
Add on EGD    Pre visit planning completed.      Procedure details:    Patient scheduled for Upper endoscopy (EGD) on 6.5.25.     Arrival time: 0845. Procedure time 1015    Facility location: [facility]; [address] [place] [address], [address] Floor, [place] [address]. Check in location: Main entrance at registration desk.    Sedation type: MAC    Pre op exam needed? No.    Indication for procedure: varices      Chart review:     Electronic implanted devices? No    Recent diagnosis of diverticulitis within the last 6 weeks? N/A      Medication review:    Diabetic? Yes. Oral diabetic medications: Glipizide (glucotrol): HOLD day of procedure.  Metformin (glucophage): HOLD day of procedure.    Anticoagulants? No    Weight loss medication/injectable? No.    Other medication HOLDING recommendations:  N/A      Prep for procedure:     EGD    Procedure information and instructions sent via bautista Laguerre RN  Endoscopy Procedure Pre Assessment   [phone] option 3

## 2025-05-29 NOTE — TELEPHONE ENCOUNTER
Pt scheduled for EGD at the [facility] [place] on 6/12/25.     Pt's Plts were 46 on 5/13/25. Will need hospital setting.     Renetta Zamora, RN   Endoscopy Procedure Pre Assessment RN

## 2025-06-08 DIAGNOSIS — E11.69 TYPE 2 DIABETES MELLITUS WITH OTHER SPECIFIED COMPLICATION, WITHOUT LONG-TERM CURRENT USE OF INSULIN (H): ICD-10-CM

## 2025-06-09 RX ORDER — METFORMIN HYDROCHLORIDE 500 MG/1
1000 TABLET, EXTENDED RELEASE ORAL
Qty: 180 TABLET | Refills: 0 | Status: SHIPPED | OUTPATIENT
Start: 2025-06-09

## 2025-07-12 ENCOUNTER — HEALTH MAINTENANCE LETTER (OUTPATIENT)
Age: 60
End: 2025-07-12

## (undated) DEVICE — KIT ENDO FIRST STEP DISINFECTANT 200ML W/POUCH EP-4

## (undated) DEVICE — DECANTER BAG 2002S

## (undated) DEVICE — SOL WATER IRRIG 500ML BOTTLE 2F7113

## (undated) DEVICE — LIGATOR SPEEDBAND SUPERVIEW 7 BAND LATEX FREE 4225

## (undated) DEVICE — NDL 18GAX1.5" 305185

## (undated) DEVICE — SYR 50ML SLIP TIP W/O NDL 309654

## (undated) DEVICE — DRSG PRIMAPORE 02X3" 7133

## (undated) DEVICE — SUCTION MANIFOLD NEPTUNE 2 SYS 1 PORT 702-025-000

## (undated) DEVICE — Device

## (undated) DEVICE — SOL NACL 0.9% INJ 250ML BAG 2B1322Q

## (undated) DEVICE — SPECIMEN CONTAINER W/10% BUFFERED FORMALIN 120ML 591201

## (undated) DEVICE — GOWN XLG DISP 9545

## (undated) DEVICE — ENDO BITE BLOCK ADULT OMNI-BLOC

## (undated) DEVICE — LINEN TOWEL PACK X5 5464

## (undated) DEVICE — KIT ENDO TURNOVER/PROCEDURE CARRY-ON 101822

## (undated) DEVICE — GLOVE EXAM NITRILE LG PF LATEX FREE 5064

## (undated) DEVICE — NDL BIOPSY TEMNO 18GAX15CM ACT1815

## (undated) DEVICE — GLOVE BIOGEL PI ULTRATOUCH G SZ 7.5 42175

## (undated) DEVICE — TUBING SUCTION MEDI-VAC 1/4"X20' N620A

## (undated) DEVICE — CATH IV 16X1-1/4 PROTECTIV 326210

## (undated) DEVICE — GELFOAM 7X12MM

## (undated) DEVICE — GOWN IMPERVIOUS 2XL BLUE

## (undated) DEVICE — PROBE COVER INTRAOPERATIVE 5"X96" PC1308

## (undated) DEVICE — LINEN GOWN XLG 5407

## (undated) DEVICE — BLADE KNIFE SURG 11 371111

## (undated) DEVICE — WIRE GUIDE BENSON STR .035X50CM G00661

## (undated) RX ORDER — ACETAMINOPHEN 325 MG/1
TABLET ORAL
Status: DISPENSED
Start: 2024-01-05

## (undated) RX ORDER — PROPOFOL 10 MG/ML
INJECTION, EMULSION INTRAVENOUS
Status: DISPENSED
Start: 2021-05-21